# Patient Record
Sex: FEMALE | Race: WHITE | NOT HISPANIC OR LATINO | Employment: OTHER | URBAN - METROPOLITAN AREA
[De-identification: names, ages, dates, MRNs, and addresses within clinical notes are randomized per-mention and may not be internally consistent; named-entity substitution may affect disease eponyms.]

---

## 2022-02-18 ENCOUNTER — APPOINTMENT (OUTPATIENT)
Dept: GENERAL RADIOLOGY | Facility: HOSPITAL | Age: 72
End: 2022-02-18

## 2022-02-18 ENCOUNTER — HOSPITAL ENCOUNTER (INPATIENT)
Facility: HOSPITAL | Age: 72
LOS: 3 days | Discharge: NURSING, REHAB OR LONG-TERM CARE | End: 2022-02-23
Attending: EMERGENCY MEDICINE | Admitting: INTERNAL MEDICINE

## 2022-02-18 ENCOUNTER — APPOINTMENT (OUTPATIENT)
Dept: CT IMAGING | Facility: HOSPITAL | Age: 72
End: 2022-02-18

## 2022-02-18 ENCOUNTER — APPOINTMENT (OUTPATIENT)
Dept: MRI IMAGING | Facility: HOSPITAL | Age: 72
End: 2022-02-18

## 2022-02-18 ENCOUNTER — APPOINTMENT (OUTPATIENT)
Dept: CARDIOLOGY | Facility: HOSPITAL | Age: 72
End: 2022-02-18

## 2022-02-18 DIAGNOSIS — R47.1 DYSARTHRIA: Primary | ICD-10-CM

## 2022-02-18 DIAGNOSIS — I63.9 CEREBROVASCULAR ACCIDENT (CVA), UNSPECIFIED MECHANISM: ICD-10-CM

## 2022-02-18 DIAGNOSIS — G93.40 ENCEPHALOPATHY: ICD-10-CM

## 2022-02-18 DIAGNOSIS — R53.1 WEAKNESS: ICD-10-CM

## 2022-02-18 PROBLEM — E66.9 OBESITY (BMI 30-39.9): Chronic | Status: ACTIVE | Noted: 2022-02-18

## 2022-02-18 PROBLEM — Z72.0 TOBACCO ABUSE: Status: ACTIVE | Noted: 2022-02-18

## 2022-02-18 PROBLEM — B37.0 ORAL THRUSH: Status: ACTIVE | Noted: 2022-02-18

## 2022-02-18 PROBLEM — I16.0 HYPERTENSIVE URGENCY: Status: ACTIVE | Noted: 2022-02-18

## 2022-02-18 PROBLEM — E66.9 OBESITY (BMI 30-39.9): Status: ACTIVE | Noted: 2022-02-18

## 2022-02-18 PROBLEM — R47.01 APHASIA: Status: ACTIVE | Noted: 2022-02-18

## 2022-02-18 PROBLEM — Z72.0 TOBACCO ABUSE: Chronic | Status: ACTIVE | Noted: 2022-02-18

## 2022-02-18 LAB
AMMONIA BLD-SCNC: 12 UMOL/L (ref 11–51)
ANION GAP SERPL CALCULATED.3IONS-SCNC: 12 MMOL/L (ref 5–15)
APTT PPP: 27.2 SECONDS (ref 24–31)
ARTERIAL PATENCY WRIST A: POSITIVE
ATMOSPHERIC PRESS: ABNORMAL MM[HG]
BASE EXCESS BLDA CALC-SCNC: 0.4 MMOL/L (ref 0–3)
BASOPHILS # BLD AUTO: 0.1 10*3/MM3 (ref 0–0.2)
BASOPHILS NFR BLD AUTO: 0.9 % (ref 0–1.5)
BDY SITE: ABNORMAL
BUN SERPL-MCNC: 11 MG/DL (ref 8–23)
BUN/CREAT SERPL: 14.3 (ref 7–25)
CALCIUM SPEC-SCNC: 9.5 MG/DL (ref 8.6–10.5)
CHLORIDE SERPL-SCNC: 104 MMOL/L (ref 98–107)
CHOLEST SERPL-MCNC: 205 MG/DL (ref 0–200)
CO2 BLDA-SCNC: 25.4 MMOL/L (ref 22–29)
CO2 SERPL-SCNC: 22 MMOL/L (ref 22–29)
CREAT SERPL-MCNC: 0.77 MG/DL (ref 0.57–1)
DEPRECATED RDW RBC AUTO: 45.5 FL (ref 37–54)
EOSINOPHIL # BLD AUTO: 0.1 10*3/MM3 (ref 0–0.4)
EOSINOPHIL NFR BLD AUTO: 0.6 % (ref 0.3–6.2)
ERYTHROCYTE [DISTWIDTH] IN BLOOD BY AUTOMATED COUNT: 14.4 % (ref 12.3–15.4)
GFR SERPL CREATININE-BSD FRML MDRD: 74 ML/MIN/1.73
GLUCOSE BLDC GLUCOMTR-MCNC: 108 MG/DL (ref 70–105)
GLUCOSE BLDC GLUCOMTR-MCNC: 121 MG/DL (ref 70–105)
GLUCOSE SERPL-MCNC: 118 MG/DL (ref 65–99)
HBA1C MFR BLD: 5.6 % (ref 3.5–5.6)
HCO3 BLDA-SCNC: 24.3 MMOL/L (ref 21–28)
HCT VFR BLD AUTO: 46.1 % (ref 34–46.6)
HDLC SERPL-MCNC: 65 MG/DL (ref 40–60)
HEMODILUTION: NO
HGB BLD-MCNC: 15.9 G/DL (ref 12–15.9)
HOLD SPECIMEN: NORMAL
HOLD SPECIMEN: NORMAL
INHALED O2 CONCENTRATION: 21 %
INR PPP: 0.97 (ref 0.93–1.1)
LDLC SERPL CALC-MCNC: 124 MG/DL (ref 0–100)
LDLC/HDLC SERPL: 1.87 {RATIO}
LYMPHOCYTES # BLD AUTO: 1.1 10*3/MM3 (ref 0.7–3.1)
LYMPHOCYTES NFR BLD AUTO: 11.7 % (ref 19.6–45.3)
MCH RBC QN AUTO: 31 PG (ref 26.6–33)
MCHC RBC AUTO-ENTMCNC: 34.4 G/DL (ref 31.5–35.7)
MCV RBC AUTO: 90.3 FL (ref 79–97)
MODALITY: ABNORMAL
MONOCYTES # BLD AUTO: 0.4 10*3/MM3 (ref 0.1–0.9)
MONOCYTES NFR BLD AUTO: 4.1 % (ref 5–12)
NEUTROPHILS NFR BLD AUTO: 8 10*3/MM3 (ref 1.7–7)
NEUTROPHILS NFR BLD AUTO: 82.7 % (ref 42.7–76)
NRBC BLD AUTO-RTO: 0.1 /100 WBC (ref 0–0.2)
PCO2 BLDA: 36.5 MM HG (ref 35–48)
PH BLDA: 7.43 PH UNITS (ref 7.35–7.45)
PLATELET # BLD AUTO: 205 10*3/MM3 (ref 140–450)
PMV BLD AUTO: 10.7 FL (ref 6–12)
PO2 BLDA: 54.3 MM HG (ref 83–108)
POTASSIUM SERPL-SCNC: 4.1 MMOL/L (ref 3.5–5.2)
PROTHROMBIN TIME: 10.8 SECONDS (ref 9.6–11.7)
RBC # BLD AUTO: 5.11 10*6/MM3 (ref 3.77–5.28)
SAO2 % BLDCOA: 88.9 % (ref 94–98)
SARS-COV-2 RNA PNL SPEC NAA+PROBE: NOT DETECTED
SODIUM SERPL-SCNC: 138 MMOL/L (ref 136–145)
TRIGL SERPL-MCNC: 92 MG/DL (ref 0–150)
TSH SERPL DL<=0.05 MIU/L-ACNC: 2.5 UIU/ML (ref 0.27–4.2)
VIT B12 BLD-MCNC: 370 PG/ML (ref 211–946)
VLDLC SERPL-MCNC: 16 MG/DL (ref 5–40)
WBC NRBC COR # BLD: 9.7 10*3/MM3 (ref 3.4–10.8)
WHOLE BLOOD HOLD SPECIMEN: NORMAL
WHOLE BLOOD HOLD SPECIMEN: NORMAL

## 2022-02-18 PROCEDURE — 70551 MRI BRAIN STEM W/O DYE: CPT

## 2022-02-18 PROCEDURE — 84443 ASSAY THYROID STIM HORMONE: CPT | Performed by: NURSE PRACTITIONER

## 2022-02-18 PROCEDURE — 85025 COMPLETE CBC W/AUTO DIFF WBC: CPT | Performed by: NURSE PRACTITIONER

## 2022-02-18 PROCEDURE — 85025 COMPLETE CBC W/AUTO DIFF WBC: CPT | Performed by: EMERGENCY MEDICINE

## 2022-02-18 PROCEDURE — 82140 ASSAY OF AMMONIA: CPT | Performed by: NURSE PRACTITIONER

## 2022-02-18 PROCEDURE — 36415 COLL VENOUS BLD VENIPUNCTURE: CPT | Performed by: NURSE PRACTITIONER

## 2022-02-18 PROCEDURE — 71045 X-RAY EXAM CHEST 1 VIEW: CPT

## 2022-02-18 PROCEDURE — 99222 1ST HOSP IP/OBS MODERATE 55: CPT | Performed by: NURSE PRACTITIONER

## 2022-02-18 PROCEDURE — 80048 BASIC METABOLIC PNL TOTAL CA: CPT | Performed by: EMERGENCY MEDICINE

## 2022-02-18 PROCEDURE — 0 IOPAMIDOL PER 1 ML: Performed by: EMERGENCY MEDICINE

## 2022-02-18 PROCEDURE — 0042T HC CT CEREBRAL PERFUSION W/WO CONTRAST: CPT

## 2022-02-18 PROCEDURE — 94799 UNLISTED PULMONARY SVC/PX: CPT

## 2022-02-18 PROCEDURE — 25010000002 ENOXAPARIN PER 10 MG: Performed by: NURSE PRACTITIONER

## 2022-02-18 PROCEDURE — 87635 SARS-COV-2 COVID-19 AMP PRB: CPT | Performed by: HOSPITALIST

## 2022-02-18 PROCEDURE — 93005 ELECTROCARDIOGRAM TRACING: CPT | Performed by: EMERGENCY MEDICINE

## 2022-02-18 PROCEDURE — 70496 CT ANGIOGRAPHY HEAD: CPT

## 2022-02-18 PROCEDURE — 82803 BLOOD GASES ANY COMBINATION: CPT

## 2022-02-18 PROCEDURE — G0378 HOSPITAL OBSERVATION PER HR: HCPCS

## 2022-02-18 PROCEDURE — 99285 EMERGENCY DEPT VISIT HI MDM: CPT

## 2022-02-18 PROCEDURE — 85610 PROTHROMBIN TIME: CPT | Performed by: EMERGENCY MEDICINE

## 2022-02-18 PROCEDURE — 70498 CT ANGIOGRAPHY NECK: CPT

## 2022-02-18 PROCEDURE — 83036 HEMOGLOBIN GLYCOSYLATED A1C: CPT | Performed by: NURSE PRACTITIONER

## 2022-02-18 PROCEDURE — 82607 VITAMIN B-12: CPT | Performed by: NURSE PRACTITIONER

## 2022-02-18 PROCEDURE — 82962 GLUCOSE BLOOD TEST: CPT

## 2022-02-18 PROCEDURE — 4A03X5D MEASUREMENT OF ARTERIAL FLOW, INTRACRANIAL, EXTERNAL APPROACH: ICD-10-PCS | Performed by: RADIOLOGY

## 2022-02-18 PROCEDURE — 80061 LIPID PANEL: CPT | Performed by: NURSE PRACTITIONER

## 2022-02-18 PROCEDURE — 94640 AIRWAY INHALATION TREATMENT: CPT

## 2022-02-18 PROCEDURE — 80048 BASIC METABOLIC PNL TOTAL CA: CPT | Performed by: NURSE PRACTITIONER

## 2022-02-18 PROCEDURE — 85730 THROMBOPLASTIN TIME PARTIAL: CPT | Performed by: EMERGENCY MEDICINE

## 2022-02-18 PROCEDURE — 36600 WITHDRAWAL OF ARTERIAL BLOOD: CPT

## 2022-02-18 PROCEDURE — 70450 CT HEAD/BRAIN W/O DYE: CPT

## 2022-02-18 RX ORDER — SODIUM CHLORIDE 0.9 % (FLUSH) 0.9 %
10 SYRINGE (ML) INJECTION AS NEEDED
Status: DISCONTINUED | OUTPATIENT
Start: 2022-02-18 | End: 2022-02-23 | Stop reason: HOSPADM

## 2022-02-18 RX ORDER — ASPIRIN 325 MG
325 TABLET ORAL DAILY
Status: DISCONTINUED | OUTPATIENT
Start: 2022-02-18 | End: 2022-02-23 | Stop reason: HOSPADM

## 2022-02-18 RX ORDER — ACETAMINOPHEN 325 MG/1
650 TABLET ORAL EVERY 4 HOURS PRN
Status: DISCONTINUED | OUTPATIENT
Start: 2022-02-18 | End: 2022-02-23 | Stop reason: HOSPADM

## 2022-02-18 RX ORDER — ACETAMINOPHEN 650 MG/1
650 SUPPOSITORY RECTAL EVERY 4 HOURS PRN
Status: DISCONTINUED | OUTPATIENT
Start: 2022-02-18 | End: 2022-02-23 | Stop reason: HOSPADM

## 2022-02-18 RX ORDER — IPRATROPIUM BROMIDE AND ALBUTEROL SULFATE 2.5; .5 MG/3ML; MG/3ML
3 SOLUTION RESPIRATORY (INHALATION)
Status: DISCONTINUED | OUTPATIENT
Start: 2022-02-18 | End: 2022-02-23

## 2022-02-18 RX ORDER — ATORVASTATIN CALCIUM 40 MG/1
80 TABLET, FILM COATED ORAL NIGHTLY
Status: DISCONTINUED | OUTPATIENT
Start: 2022-02-18 | End: 2022-02-23 | Stop reason: HOSPADM

## 2022-02-18 RX ORDER — ONDANSETRON 4 MG/1
4 TABLET, FILM COATED ORAL EVERY 6 HOURS PRN
Status: DISCONTINUED | OUTPATIENT
Start: 2022-02-18 | End: 2022-02-23 | Stop reason: HOSPADM

## 2022-02-18 RX ORDER — ACETAMINOPHEN 325 MG/1
650 TABLET ORAL EVERY 4 HOURS PRN
Status: DISCONTINUED | OUTPATIENT
Start: 2022-02-18 | End: 2022-02-18

## 2022-02-18 RX ORDER — ONDANSETRON 2 MG/ML
4 INJECTION INTRAMUSCULAR; INTRAVENOUS EVERY 6 HOURS PRN
Status: DISCONTINUED | OUTPATIENT
Start: 2022-02-18 | End: 2022-02-23 | Stop reason: HOSPADM

## 2022-02-18 RX ORDER — NICOTINE 21 MG/24HR
1 PATCH, TRANSDERMAL 24 HOURS TRANSDERMAL EVERY 24 HOURS
Status: DISCONTINUED | OUTPATIENT
Start: 2022-02-18 | End: 2022-02-23 | Stop reason: HOSPADM

## 2022-02-18 RX ORDER — ONDANSETRON 2 MG/ML
4 INJECTION INTRAMUSCULAR; INTRAVENOUS EVERY 6 HOURS PRN
Status: DISCONTINUED | OUTPATIENT
Start: 2022-02-18 | End: 2022-02-18

## 2022-02-18 RX ORDER — ACETAMINOPHEN 160 MG/5ML
650 SOLUTION ORAL EVERY 4 HOURS PRN
Status: DISCONTINUED | OUTPATIENT
Start: 2022-02-18 | End: 2022-02-23 | Stop reason: HOSPADM

## 2022-02-18 RX ORDER — ALUMINA, MAGNESIA, AND SIMETHICONE 2400; 2400; 240 MG/30ML; MG/30ML; MG/30ML
15 SUSPENSION ORAL EVERY 6 HOURS PRN
Status: DISCONTINUED | OUTPATIENT
Start: 2022-02-18 | End: 2022-02-23 | Stop reason: HOSPADM

## 2022-02-18 RX ORDER — POTASSIUM CHLORIDE 1.5 G/1.77G
40 POWDER, FOR SOLUTION ORAL AS NEEDED
Status: DISCONTINUED | OUTPATIENT
Start: 2022-02-18 | End: 2022-02-23 | Stop reason: HOSPADM

## 2022-02-18 RX ORDER — BISACODYL 10 MG
10 SUPPOSITORY, RECTAL RECTAL DAILY PRN
Status: DISCONTINUED | OUTPATIENT
Start: 2022-02-18 | End: 2022-02-23 | Stop reason: HOSPADM

## 2022-02-18 RX ORDER — ASPIRIN 300 MG/1
300 SUPPOSITORY RECTAL DAILY
Status: DISCONTINUED | OUTPATIENT
Start: 2022-02-18 | End: 2022-02-23 | Stop reason: HOSPADM

## 2022-02-18 RX ORDER — ACETAMINOPHEN 650 MG/1
650 SUPPOSITORY RECTAL EVERY 4 HOURS PRN
Status: DISCONTINUED | OUTPATIENT
Start: 2022-02-18 | End: 2022-02-18

## 2022-02-18 RX ORDER — CHOLECALCIFEROL (VITAMIN D3) 125 MCG
5 CAPSULE ORAL NIGHTLY PRN
Status: DISCONTINUED | OUTPATIENT
Start: 2022-02-18 | End: 2022-02-23 | Stop reason: HOSPADM

## 2022-02-18 RX ORDER — POTASSIUM CHLORIDE 20 MEQ/1
40 TABLET, EXTENDED RELEASE ORAL AS NEEDED
Status: DISCONTINUED | OUTPATIENT
Start: 2022-02-18 | End: 2022-02-23 | Stop reason: HOSPADM

## 2022-02-18 RX ADMIN — NICARDIPINE HYDROCHLORIDE 5 MG/HR: 25 INJECTION, SOLUTION INTRAVENOUS at 20:13

## 2022-02-18 RX ADMIN — NICARDIPINE HYDROCHLORIDE 5 MG/HR: 25 INJECTION, SOLUTION INTRAVENOUS at 14:02

## 2022-02-18 RX ADMIN — IPRATROPIUM BROMIDE AND ALBUTEROL SULFATE 3 ML: 2.5; .5 SOLUTION RESPIRATORY (INHALATION) at 18:57

## 2022-02-18 RX ADMIN — ASPIRIN 300 MG: 300 SUPPOSITORY RECTAL at 16:13

## 2022-02-18 RX ADMIN — IOPAMIDOL 100 ML: 755 INJECTION, SOLUTION INTRAVENOUS at 13:17

## 2022-02-18 RX ADMIN — IOPAMIDOL 50 ML: 755 INJECTION, SOLUTION INTRAVENOUS at 13:17

## 2022-02-18 RX ADMIN — NICARDIPINE HYDROCHLORIDE 5 MG/HR: 25 INJECTION, SOLUTION INTRAVENOUS at 23:03

## 2022-02-18 RX ADMIN — ENOXAPARIN SODIUM 40 MG: 40 INJECTION SUBCUTANEOUS at 16:13

## 2022-02-19 ENCOUNTER — APPOINTMENT (OUTPATIENT)
Dept: CARDIOLOGY | Facility: HOSPITAL | Age: 72
End: 2022-02-19

## 2022-02-19 LAB
ANION GAP SERPL CALCULATED.3IONS-SCNC: 13 MMOL/L (ref 5–15)
BASOPHILS # BLD AUTO: 0.1 10*3/MM3 (ref 0–0.2)
BASOPHILS NFR BLD AUTO: 0.5 % (ref 0–1.5)
BUN SERPL-MCNC: 11 MG/DL (ref 8–23)
BUN/CREAT SERPL: 16.4 (ref 7–25)
CALCIUM SPEC-SCNC: 9.1 MG/DL (ref 8.6–10.5)
CHLORIDE SERPL-SCNC: 103 MMOL/L (ref 98–107)
CO2 SERPL-SCNC: 24 MMOL/L (ref 22–29)
CREAT SERPL-MCNC: 0.67 MG/DL (ref 0.57–1)
DEPRECATED RDW RBC AUTO: 45.9 FL (ref 37–54)
EOSINOPHIL # BLD AUTO: 0.1 10*3/MM3 (ref 0–0.4)
EOSINOPHIL NFR BLD AUTO: 0.9 % (ref 0.3–6.2)
ERYTHROCYTE [DISTWIDTH] IN BLOOD BY AUTOMATED COUNT: 14.5 % (ref 12.3–15.4)
GFR SERPL CREATININE-BSD FRML MDRD: 87 ML/MIN/1.73
GLUCOSE BLDC GLUCOMTR-MCNC: 114 MG/DL (ref 70–105)
GLUCOSE SERPL-MCNC: 103 MG/DL (ref 65–99)
HCT VFR BLD AUTO: 45 % (ref 34–46.6)
HGB BLD-MCNC: 15.1 G/DL (ref 12–15.9)
LYMPHOCYTES # BLD AUTO: 1.8 10*3/MM3 (ref 0.7–3.1)
LYMPHOCYTES NFR BLD AUTO: 15.9 % (ref 19.6–45.3)
MAXIMAL PREDICTED HEART RATE: 148 BPM
MCH RBC QN AUTO: 30.3 PG (ref 26.6–33)
MCHC RBC AUTO-ENTMCNC: 33.5 G/DL (ref 31.5–35.7)
MCV RBC AUTO: 90.6 FL (ref 79–97)
MONOCYTES # BLD AUTO: 0.6 10*3/MM3 (ref 0.1–0.9)
MONOCYTES NFR BLD AUTO: 5.6 % (ref 5–12)
NEUTROPHILS NFR BLD AUTO: 77.1 % (ref 42.7–76)
NEUTROPHILS NFR BLD AUTO: 8.7 10*3/MM3 (ref 1.7–7)
NRBC BLD AUTO-RTO: 0 /100 WBC (ref 0–0.2)
PLATELET # BLD AUTO: 197 10*3/MM3 (ref 140–450)
PMV BLD AUTO: 10.6 FL (ref 6–12)
POTASSIUM SERPL-SCNC: 3.7 MMOL/L (ref 3.5–5.2)
RBC # BLD AUTO: 4.97 10*6/MM3 (ref 3.77–5.28)
SODIUM SERPL-SCNC: 140 MMOL/L (ref 136–145)
STRESS TARGET HR: 126 BPM
WBC NRBC COR # BLD: 11.2 10*3/MM3 (ref 3.4–10.8)

## 2022-02-19 PROCEDURE — 99232 SBSQ HOSP IP/OBS MODERATE 35: CPT | Performed by: HOSPITALIST

## 2022-02-19 PROCEDURE — 94799 UNLISTED PULMONARY SVC/PX: CPT

## 2022-02-19 PROCEDURE — G0378 HOSPITAL OBSERVATION PER HR: HCPCS

## 2022-02-19 PROCEDURE — 93306 TTE W/DOPPLER COMPLETE: CPT

## 2022-02-19 PROCEDURE — 25010000002 ENOXAPARIN PER 10 MG: Performed by: NURSE PRACTITIONER

## 2022-02-19 PROCEDURE — 93306 TTE W/DOPPLER COMPLETE: CPT | Performed by: INTERNAL MEDICINE

## 2022-02-19 PROCEDURE — 97162 PT EVAL MOD COMPLEX 30 MIN: CPT

## 2022-02-19 PROCEDURE — 92523 SPEECH SOUND LANG COMPREHEN: CPT

## 2022-02-19 PROCEDURE — 92610 EVALUATE SWALLOWING FUNCTION: CPT

## 2022-02-19 PROCEDURE — 82962 GLUCOSE BLOOD TEST: CPT

## 2022-02-19 RX ORDER — LABETALOL HYDROCHLORIDE 5 MG/ML
10 INJECTION, SOLUTION INTRAVENOUS
Status: DISCONTINUED | OUTPATIENT
Start: 2022-02-19 | End: 2022-02-23 | Stop reason: HOSPADM

## 2022-02-19 RX ADMIN — ATORVASTATIN CALCIUM 80 MG: 40 TABLET, FILM COATED ORAL at 20:18

## 2022-02-19 RX ADMIN — NYSTATIN 500000 UNITS: 100000 SUSPENSION ORAL at 04:13

## 2022-02-19 RX ADMIN — LABETALOL 20 MG/4 ML (5 MG/ML) INTRAVENOUS SYRINGE 10 MG: at 22:52

## 2022-02-19 RX ADMIN — NYSTATIN 500000 UNITS: 100000 SUSPENSION ORAL at 12:48

## 2022-02-19 RX ADMIN — ENOXAPARIN SODIUM 40 MG: 40 INJECTION SUBCUTANEOUS at 17:31

## 2022-02-19 RX ADMIN — IPRATROPIUM BROMIDE AND ALBUTEROL SULFATE 3 ML: 2.5; .5 SOLUTION RESPIRATORY (INHALATION) at 11:30

## 2022-02-19 RX ADMIN — IPRATROPIUM BROMIDE AND ALBUTEROL SULFATE 3 ML: 2.5; .5 SOLUTION RESPIRATORY (INHALATION) at 15:39

## 2022-02-19 RX ADMIN — NYSTATIN 500000 UNITS: 100000 SUSPENSION ORAL at 17:32

## 2022-02-19 RX ADMIN — IPRATROPIUM BROMIDE AND ALBUTEROL SULFATE 3 ML: 2.5; .5 SOLUTION RESPIRATORY (INHALATION) at 07:35

## 2022-02-19 RX ADMIN — ASPIRIN 325 MG ORAL TABLET 325 MG: 325 PILL ORAL at 12:47

## 2022-02-19 RX ADMIN — NYSTATIN 500000 UNITS: 100000 SUSPENSION ORAL at 20:18

## 2022-02-20 PROBLEM — I63.9 CVA (CEREBRAL VASCULAR ACCIDENT): Status: ACTIVE | Noted: 2022-02-20

## 2022-02-20 LAB
AMPHET+METHAMPHET UR QL: NEGATIVE
BACTERIA UR QL AUTO: ABNORMAL /HPF
BARBITURATES UR QL SCN: NEGATIVE
BASOPHILS # BLD AUTO: 0.1 10*3/MM3 (ref 0–0.2)
BASOPHILS NFR BLD AUTO: 1.1 % (ref 0–1.5)
BENZODIAZ UR QL SCN: NEGATIVE
BILIRUB UR QL STRIP: ABNORMAL
CANNABINOIDS SERPL QL: NEGATIVE
CLARITY UR: CLEAR
COCAINE UR QL: NEGATIVE
COLOR UR: ABNORMAL
DEPRECATED RDW RBC AUTO: 46.8 FL (ref 37–54)
EOSINOPHIL # BLD AUTO: 0.3 10*3/MM3 (ref 0–0.4)
EOSINOPHIL NFR BLD AUTO: 2.9 % (ref 0.3–6.2)
ERYTHROCYTE [DISTWIDTH] IN BLOOD BY AUTOMATED COUNT: 14.5 % (ref 12.3–15.4)
GLUCOSE BLDC GLUCOMTR-MCNC: 105 MG/DL (ref 70–105)
GLUCOSE BLDC GLUCOMTR-MCNC: 106 MG/DL (ref 70–105)
GLUCOSE UR STRIP-MCNC: NEGATIVE MG/DL
HCT VFR BLD AUTO: 44 % (ref 34–46.6)
HGB BLD-MCNC: 14.7 G/DL (ref 12–15.9)
HGB UR QL STRIP.AUTO: NEGATIVE
HYALINE CASTS UR QL AUTO: ABNORMAL /LPF
KETONES UR QL STRIP: ABNORMAL
LEUKOCYTE ESTERASE UR QL STRIP.AUTO: ABNORMAL
LYMPHOCYTES # BLD AUTO: 2 10*3/MM3 (ref 0.7–3.1)
LYMPHOCYTES NFR BLD AUTO: 20.7 % (ref 19.6–45.3)
MCH RBC QN AUTO: 30.2 PG (ref 26.6–33)
MCHC RBC AUTO-ENTMCNC: 33.3 G/DL (ref 31.5–35.7)
MCV RBC AUTO: 90.7 FL (ref 79–97)
METHADONE UR QL SCN: NEGATIVE
MONOCYTES # BLD AUTO: 0.8 10*3/MM3 (ref 0.1–0.9)
MONOCYTES NFR BLD AUTO: 8 % (ref 5–12)
MUCOUS THREADS URNS QL MICRO: ABNORMAL /HPF
NEUTROPHILS NFR BLD AUTO: 6.4 10*3/MM3 (ref 1.7–7)
NEUTROPHILS NFR BLD AUTO: 67.3 % (ref 42.7–76)
NITRITE UR QL STRIP: POSITIVE
NRBC BLD AUTO-RTO: 0.1 /100 WBC (ref 0–0.2)
OPIATES UR QL: NEGATIVE
OXYCODONE UR QL SCN: NEGATIVE
PH UR STRIP.AUTO: <=5 [PH] (ref 5–8)
PLATELET # BLD AUTO: 188 10*3/MM3 (ref 140–450)
PMV BLD AUTO: 10.7 FL (ref 6–12)
PROT UR QL STRIP: ABNORMAL
RBC # BLD AUTO: 4.85 10*6/MM3 (ref 3.77–5.28)
RBC # UR STRIP: ABNORMAL /HPF
REF LAB TEST METHOD: ABNORMAL
SP GR UR STRIP: 1.03 (ref 1–1.03)
SQUAMOUS #/AREA URNS HPF: ABNORMAL /HPF
UROBILINOGEN UR QL STRIP: ABNORMAL
WBC # UR STRIP: ABNORMAL /HPF
WBC NRBC COR # BLD: 9.6 10*3/MM3 (ref 3.4–10.8)

## 2022-02-20 PROCEDURE — 82962 GLUCOSE BLOOD TEST: CPT

## 2022-02-20 PROCEDURE — 80307 DRUG TEST PRSMV CHEM ANLYZR: CPT | Performed by: NURSE PRACTITIONER

## 2022-02-20 PROCEDURE — 25010000002 ENOXAPARIN PER 10 MG: Performed by: NURSE PRACTITIONER

## 2022-02-20 PROCEDURE — 99222 1ST HOSP IP/OBS MODERATE 55: CPT | Performed by: PSYCHIATRY & NEUROLOGY

## 2022-02-20 PROCEDURE — 85025 COMPLETE CBC W/AUTO DIFF WBC: CPT | Performed by: HOSPITALIST

## 2022-02-20 PROCEDURE — 94799 UNLISTED PULMONARY SVC/PX: CPT

## 2022-02-20 PROCEDURE — 81001 URINALYSIS AUTO W/SCOPE: CPT | Performed by: NURSE PRACTITIONER

## 2022-02-20 PROCEDURE — 80048 BASIC METABOLIC PNL TOTAL CA: CPT | Performed by: HOSPITALIST

## 2022-02-20 PROCEDURE — 99232 SBSQ HOSP IP/OBS MODERATE 35: CPT | Performed by: HOSPITALIST

## 2022-02-20 RX ORDER — AMLODIPINE BESYLATE 5 MG/1
5 TABLET ORAL
Status: DISCONTINUED | OUTPATIENT
Start: 2022-02-20 | End: 2022-02-23 | Stop reason: HOSPADM

## 2022-02-20 RX ORDER — LISINOPRIL 5 MG/1
5 TABLET ORAL
Status: DISCONTINUED | OUTPATIENT
Start: 2022-02-20 | End: 2022-02-21

## 2022-02-20 RX ORDER — DOCUSATE SODIUM 100 MG/1
100 CAPSULE, LIQUID FILLED ORAL 2 TIMES DAILY
Status: DISCONTINUED | OUTPATIENT
Start: 2022-02-20 | End: 2022-02-23 | Stop reason: HOSPADM

## 2022-02-20 RX ADMIN — AMLODIPINE BESYLATE 5 MG: 5 TABLET ORAL at 15:17

## 2022-02-20 RX ADMIN — ENOXAPARIN SODIUM 40 MG: 40 INJECTION SUBCUTANEOUS at 15:18

## 2022-02-20 RX ADMIN — LISINOPRIL 5 MG: 5 TABLET ORAL at 15:17

## 2022-02-20 RX ADMIN — IPRATROPIUM BROMIDE AND ALBUTEROL SULFATE 3 ML: 2.5; .5 SOLUTION RESPIRATORY (INHALATION) at 11:56

## 2022-02-20 RX ADMIN — IPRATROPIUM BROMIDE AND ALBUTEROL SULFATE 3 ML: 2.5; .5 SOLUTION RESPIRATORY (INHALATION) at 08:07

## 2022-02-20 RX ADMIN — ATORVASTATIN CALCIUM 80 MG: 40 TABLET, FILM COATED ORAL at 21:02

## 2022-02-20 RX ADMIN — NYSTATIN 500000 UNITS: 100000 SUSPENSION ORAL at 08:27

## 2022-02-20 RX ADMIN — NYSTATIN 500000 UNITS: 100000 SUSPENSION ORAL at 21:02

## 2022-02-20 RX ADMIN — LABETALOL 20 MG/4 ML (5 MG/ML) INTRAVENOUS SYRINGE 10 MG: at 14:09

## 2022-02-20 RX ADMIN — IPRATROPIUM BROMIDE AND ALBUTEROL SULFATE 3 ML: 2.5; .5 SOLUTION RESPIRATORY (INHALATION) at 16:21

## 2022-02-20 RX ADMIN — DOCUSATE SODIUM 100 MG: 100 CAPSULE, LIQUID FILLED ORAL at 21:02

## 2022-02-20 RX ADMIN — ASPIRIN 325 MG ORAL TABLET 325 MG: 325 PILL ORAL at 08:27

## 2022-02-21 LAB
ANION GAP SERPL CALCULATED.3IONS-SCNC: 10 MMOL/L (ref 5–15)
BUN SERPL-MCNC: 20 MG/DL (ref 8–23)
BUN/CREAT SERPL: 27.8 (ref 7–25)
CALCIUM SPEC-SCNC: 9.1 MG/DL (ref 8.6–10.5)
CHLORIDE SERPL-SCNC: 107 MMOL/L (ref 98–107)
CO2 SERPL-SCNC: 24 MMOL/L (ref 22–29)
CREAT SERPL-MCNC: 0.72 MG/DL (ref 0.57–1)
GFR SERPL CREATININE-BSD FRML MDRD: 80 ML/MIN/1.73
GLUCOSE SERPL-MCNC: 97 MG/DL (ref 65–99)
POTASSIUM SERPL-SCNC: 4.3 MMOL/L (ref 3.5–5.2)
SODIUM SERPL-SCNC: 141 MMOL/L (ref 136–145)

## 2022-02-21 PROCEDURE — 25010000002 ENOXAPARIN PER 10 MG: Performed by: NURSE PRACTITIONER

## 2022-02-21 PROCEDURE — 97535 SELF CARE MNGMENT TRAINING: CPT

## 2022-02-21 PROCEDURE — 99233 SBSQ HOSP IP/OBS HIGH 50: CPT | Performed by: INTERNAL MEDICINE

## 2022-02-21 PROCEDURE — 25010000002 CYANOCOBALAMIN PER 1000 MCG: Performed by: PSYCHIATRY & NEUROLOGY

## 2022-02-21 PROCEDURE — 97530 THERAPEUTIC ACTIVITIES: CPT

## 2022-02-21 PROCEDURE — 99233 SBSQ HOSP IP/OBS HIGH 50: CPT | Performed by: PSYCHIATRY & NEUROLOGY

## 2022-02-21 PROCEDURE — 97167 OT EVAL HIGH COMPLEX 60 MIN: CPT

## 2022-02-21 PROCEDURE — 92526 ORAL FUNCTION THERAPY: CPT

## 2022-02-21 RX ORDER — CYANOCOBALAMIN 1000 UG/ML
1000 INJECTION, SOLUTION INTRAMUSCULAR; SUBCUTANEOUS
Status: DISCONTINUED | OUTPATIENT
Start: 2022-02-21 | End: 2022-02-23 | Stop reason: HOSPADM

## 2022-02-21 RX ORDER — LISINOPRIL 5 MG/1
10 TABLET ORAL
Status: DISCONTINUED | OUTPATIENT
Start: 2022-02-22 | End: 2022-02-23 | Stop reason: HOSPADM

## 2022-02-21 RX ADMIN — AMLODIPINE BESYLATE 5 MG: 5 TABLET ORAL at 08:40

## 2022-02-21 RX ADMIN — NYSTATIN 500000 UNITS: 100000 SUSPENSION ORAL at 08:40

## 2022-02-21 RX ADMIN — CYANOCOBALAMIN 1000 MCG: 1000 INJECTION, SOLUTION INTRAMUSCULAR at 14:26

## 2022-02-21 RX ADMIN — DOCUSATE SODIUM 100 MG: 100 CAPSULE, LIQUID FILLED ORAL at 20:10

## 2022-02-21 RX ADMIN — NYSTATIN 500000 UNITS: 100000 SUSPENSION ORAL at 12:48

## 2022-02-21 RX ADMIN — ASPIRIN 325 MG ORAL TABLET 325 MG: 325 PILL ORAL at 08:40

## 2022-02-21 RX ADMIN — ENOXAPARIN SODIUM 40 MG: 40 INJECTION SUBCUTANEOUS at 16:24

## 2022-02-21 RX ADMIN — LISINOPRIL 5 MG: 5 TABLET ORAL at 08:40

## 2022-02-21 RX ADMIN — ATORVASTATIN CALCIUM 80 MG: 40 TABLET, FILM COATED ORAL at 20:10

## 2022-02-21 RX ADMIN — DOCUSATE SODIUM 100 MG: 100 CAPSULE, LIQUID FILLED ORAL at 08:40

## 2022-02-22 LAB
ANION GAP SERPL CALCULATED.3IONS-SCNC: 11 MMOL/L (ref 5–15)
BASOPHILS # BLD AUTO: 0.1 10*3/MM3 (ref 0–0.2)
BASOPHILS NFR BLD AUTO: 0.6 % (ref 0–1.5)
BUN SERPL-MCNC: 20 MG/DL (ref 8–23)
BUN/CREAT SERPL: 28.6 (ref 7–25)
CALCIUM SPEC-SCNC: 9.1 MG/DL (ref 8.6–10.5)
CHLORIDE SERPL-SCNC: 106 MMOL/L (ref 98–107)
CO2 SERPL-SCNC: 24 MMOL/L (ref 22–29)
CREAT SERPL-MCNC: 0.7 MG/DL (ref 0.57–1)
DEPRECATED RDW RBC AUTO: 45.1 FL (ref 37–54)
EOSINOPHIL # BLD AUTO: 0.3 10*3/MM3 (ref 0–0.4)
EOSINOPHIL NFR BLD AUTO: 3.3 % (ref 0.3–6.2)
ERYTHROCYTE [DISTWIDTH] IN BLOOD BY AUTOMATED COUNT: 14.2 % (ref 12.3–15.4)
GFR SERPL CREATININE-BSD FRML MDRD: 82 ML/MIN/1.73
GLUCOSE SERPL-MCNC: 91 MG/DL (ref 65–99)
HCT VFR BLD AUTO: 43.9 % (ref 34–46.6)
HGB BLD-MCNC: 14.8 G/DL (ref 12–15.9)
LYMPHOCYTES # BLD AUTO: 1.6 10*3/MM3 (ref 0.7–3.1)
LYMPHOCYTES NFR BLD AUTO: 16.2 % (ref 19.6–45.3)
MCH RBC QN AUTO: 30.4 PG (ref 26.6–33)
MCHC RBC AUTO-ENTMCNC: 33.8 G/DL (ref 31.5–35.7)
MCV RBC AUTO: 89.8 FL (ref 79–97)
MONOCYTES # BLD AUTO: 0.6 10*3/MM3 (ref 0.1–0.9)
MONOCYTES NFR BLD AUTO: 6.2 % (ref 5–12)
NEUTROPHILS NFR BLD AUTO: 7.1 10*3/MM3 (ref 1.7–7)
NEUTROPHILS NFR BLD AUTO: 73.7 % (ref 42.7–76)
NRBC BLD AUTO-RTO: 0.1 /100 WBC (ref 0–0.2)
PLATELET # BLD AUTO: 183 10*3/MM3 (ref 140–450)
PMV BLD AUTO: 10.8 FL (ref 6–12)
POTASSIUM SERPL-SCNC: 3.7 MMOL/L (ref 3.5–5.2)
QT INTERVAL: 405 MS
RBC # BLD AUTO: 4.89 10*6/MM3 (ref 3.77–5.28)
SODIUM SERPL-SCNC: 141 MMOL/L (ref 136–145)
WBC NRBC COR # BLD: 9.7 10*3/MM3 (ref 3.4–10.8)

## 2022-02-22 PROCEDURE — 97112 NEUROMUSCULAR REEDUCATION: CPT

## 2022-02-22 PROCEDURE — 92526 ORAL FUNCTION THERAPY: CPT

## 2022-02-22 PROCEDURE — 97116 GAIT TRAINING THERAPY: CPT

## 2022-02-22 PROCEDURE — 92507 TX SP LANG VOICE COMM INDIV: CPT

## 2022-02-22 PROCEDURE — 99233 SBSQ HOSP IP/OBS HIGH 50: CPT | Performed by: INTERNAL MEDICINE

## 2022-02-22 PROCEDURE — 97535 SELF CARE MNGMENT TRAINING: CPT

## 2022-02-22 PROCEDURE — 85025 COMPLETE CBC W/AUTO DIFF WBC: CPT | Performed by: HOSPITALIST

## 2022-02-22 PROCEDURE — 80048 BASIC METABOLIC PNL TOTAL CA: CPT | Performed by: HOSPITALIST

## 2022-02-22 PROCEDURE — 97530 THERAPEUTIC ACTIVITIES: CPT

## 2022-02-22 PROCEDURE — 25010000002 ENOXAPARIN PER 10 MG: Performed by: NURSE PRACTITIONER

## 2022-02-22 RX ADMIN — ASPIRIN 325 MG ORAL TABLET 325 MG: 325 PILL ORAL at 09:44

## 2022-02-22 RX ADMIN — Medication 10 ML: at 09:45

## 2022-02-22 RX ADMIN — DOCUSATE SODIUM 100 MG: 100 CAPSULE, LIQUID FILLED ORAL at 09:44

## 2022-02-22 RX ADMIN — AMLODIPINE BESYLATE 5 MG: 5 TABLET ORAL at 09:44

## 2022-02-22 RX ADMIN — LISINOPRIL 10 MG: 5 TABLET ORAL at 09:44

## 2022-02-22 RX ADMIN — ENOXAPARIN SODIUM 40 MG: 40 INJECTION SUBCUTANEOUS at 16:58

## 2022-02-22 RX ADMIN — ATORVASTATIN CALCIUM 80 MG: 40 TABLET, FILM COATED ORAL at 21:19

## 2022-02-23 VITALS
HEART RATE: 76 BPM | BODY MASS INDEX: 31.84 KG/M2 | RESPIRATION RATE: 20 BRPM | TEMPERATURE: 97.7 F | WEIGHT: 210.1 LBS | SYSTOLIC BLOOD PRESSURE: 143 MMHG | OXYGEN SATURATION: 94 % | DIASTOLIC BLOOD PRESSURE: 57 MMHG | HEIGHT: 68 IN

## 2022-02-23 LAB
ANION GAP SERPL CALCULATED.3IONS-SCNC: 12 MMOL/L (ref 5–15)
BASOPHILS # BLD AUTO: 0.1 10*3/MM3 (ref 0–0.2)
BASOPHILS NFR BLD AUTO: 0.9 % (ref 0–1.5)
BUN SERPL-MCNC: 19 MG/DL (ref 8–23)
BUN/CREAT SERPL: 28.8 (ref 7–25)
CALCIUM SPEC-SCNC: 9 MG/DL (ref 8.6–10.5)
CHLORIDE SERPL-SCNC: 105 MMOL/L (ref 98–107)
CO2 SERPL-SCNC: 22 MMOL/L (ref 22–29)
CREAT SERPL-MCNC: 0.66 MG/DL (ref 0.57–1)
DEPRECATED RDW RBC AUTO: 45.9 FL (ref 37–54)
EOSINOPHIL # BLD AUTO: 0.2 10*3/MM3 (ref 0–0.4)
EOSINOPHIL NFR BLD AUTO: 1.9 % (ref 0.3–6.2)
ERYTHROCYTE [DISTWIDTH] IN BLOOD BY AUTOMATED COUNT: 14.3 % (ref 12.3–15.4)
GFR SERPL CREATININE-BSD FRML MDRD: 88 ML/MIN/1.73
GLUCOSE SERPL-MCNC: 93 MG/DL (ref 65–99)
HCT VFR BLD AUTO: 44.1 % (ref 34–46.6)
HGB BLD-MCNC: 15 G/DL (ref 12–15.9)
LYMPHOCYTES # BLD AUTO: 1.8 10*3/MM3 (ref 0.7–3.1)
LYMPHOCYTES NFR BLD AUTO: 16.9 % (ref 19.6–45.3)
MCH RBC QN AUTO: 30.9 PG (ref 26.6–33)
MCHC RBC AUTO-ENTMCNC: 34.1 G/DL (ref 31.5–35.7)
MCV RBC AUTO: 90.6 FL (ref 79–97)
MONOCYTES # BLD AUTO: 0.7 10*3/MM3 (ref 0.1–0.9)
MONOCYTES NFR BLD AUTO: 6.6 % (ref 5–12)
NEUTROPHILS NFR BLD AUTO: 7.9 10*3/MM3 (ref 1.7–7)
NEUTROPHILS NFR BLD AUTO: 73.7 % (ref 42.7–76)
NRBC BLD AUTO-RTO: 0 /100 WBC (ref 0–0.2)
PLATELET # BLD AUTO: 188 10*3/MM3 (ref 140–450)
PMV BLD AUTO: 11 FL (ref 6–12)
POTASSIUM SERPL-SCNC: 3.3 MMOL/L (ref 3.5–5.2)
RBC # BLD AUTO: 4.87 10*6/MM3 (ref 3.77–5.28)
SODIUM SERPL-SCNC: 139 MMOL/L (ref 136–145)
WBC NRBC COR # BLD: 10.7 10*3/MM3 (ref 3.4–10.8)

## 2022-02-23 PROCEDURE — 80048 BASIC METABOLIC PNL TOTAL CA: CPT | Performed by: HOSPITALIST

## 2022-02-23 PROCEDURE — 85025 COMPLETE CBC W/AUTO DIFF WBC: CPT | Performed by: HOSPITALIST

## 2022-02-23 PROCEDURE — 99239 HOSP IP/OBS DSCHRG MGMT >30: CPT | Performed by: INTERNAL MEDICINE

## 2022-02-23 RX ORDER — IPRATROPIUM BROMIDE AND ALBUTEROL SULFATE 2.5; .5 MG/3ML; MG/3ML
3 SOLUTION RESPIRATORY (INHALATION) EVERY 4 HOURS PRN
Status: DISCONTINUED | OUTPATIENT
Start: 2022-02-23 | End: 2022-02-23 | Stop reason: HOSPADM

## 2022-02-23 RX ORDER — PSEUDOEPHEDRINE HCL 30 MG
100 TABLET ORAL 2 TIMES DAILY
Start: 2022-02-23 | End: 2022-03-16

## 2022-02-23 RX ORDER — ASPIRIN 325 MG
325 TABLET ORAL DAILY
Start: 2022-02-24 | End: 2022-12-09 | Stop reason: HOSPADM

## 2022-02-23 RX ORDER — CYANOCOBALAMIN 1000 UG/ML
1000 INJECTION, SOLUTION INTRAMUSCULAR; SUBCUTANEOUS
Start: 2022-03-21 | End: 2022-03-16

## 2022-02-23 RX ORDER — CHOLECALCIFEROL (VITAMIN D3) 125 MCG
5 CAPSULE ORAL NIGHTLY PRN
Start: 2022-02-23 | End: 2022-03-16

## 2022-02-23 RX ORDER — LISINOPRIL 10 MG/1
10 TABLET ORAL
Start: 2022-02-24 | End: 2022-03-16 | Stop reason: SDUPTHER

## 2022-02-23 RX ORDER — ATORVASTATIN CALCIUM 80 MG/1
80 TABLET, FILM COATED ORAL NIGHTLY
Start: 2022-02-23 | End: 2022-03-16 | Stop reason: SDUPTHER

## 2022-02-23 RX ORDER — AMLODIPINE BESYLATE 5 MG/1
5 TABLET ORAL
Start: 2022-02-24 | End: 2022-03-16 | Stop reason: SDUPTHER

## 2022-02-23 RX ORDER — IPRATROPIUM BROMIDE AND ALBUTEROL SULFATE 2.5; .5 MG/3ML; MG/3ML
3 SOLUTION RESPIRATORY (INHALATION) EVERY 4 HOURS PRN
Qty: 360 ML
Start: 2022-02-23 | End: 2022-03-16

## 2022-02-23 RX ORDER — NICOTINE 21 MG/24HR
1 PATCH, TRANSDERMAL 24 HOURS TRANSDERMAL EVERY 24 HOURS
Start: 2022-02-24 | End: 2022-03-16

## 2022-02-23 RX ADMIN — LISINOPRIL 10 MG: 5 TABLET ORAL at 08:37

## 2022-02-23 RX ADMIN — POTASSIUM CHLORIDE 40 MEQ: 1500 TABLET, EXTENDED RELEASE ORAL at 14:30

## 2022-02-23 RX ADMIN — ASPIRIN 325 MG ORAL TABLET 325 MG: 325 PILL ORAL at 08:37

## 2022-02-23 RX ADMIN — POTASSIUM CHLORIDE 40 MEQ: 1500 TABLET, EXTENDED RELEASE ORAL at 08:37

## 2022-02-23 RX ADMIN — Medication 10 ML: at 08:37

## 2022-02-23 RX ADMIN — AMLODIPINE BESYLATE 5 MG: 5 TABLET ORAL at 08:37

## 2022-03-16 ENCOUNTER — OFFICE VISIT (OUTPATIENT)
Dept: FAMILY MEDICINE CLINIC | Facility: CLINIC | Age: 72
End: 2022-03-16

## 2022-03-16 VITALS
HEIGHT: 68 IN | OXYGEN SATURATION: 96 % | SYSTOLIC BLOOD PRESSURE: 151 MMHG | BODY MASS INDEX: 31.37 KG/M2 | HEART RATE: 67 BPM | DIASTOLIC BLOOD PRESSURE: 75 MMHG | WEIGHT: 207 LBS

## 2022-03-16 DIAGNOSIS — R53.1 RIGHT SIDED WEAKNESS: ICD-10-CM

## 2022-03-16 DIAGNOSIS — E53.8 B12 DEFICIENCY: ICD-10-CM

## 2022-03-16 DIAGNOSIS — I63.81 CEREBROVASCULAR ACCIDENT (CVA) OF LEFT BASAL GANGLIA: Primary | ICD-10-CM

## 2022-03-16 DIAGNOSIS — I10 PRIMARY HYPERTENSION: ICD-10-CM

## 2022-03-16 DIAGNOSIS — E78.2 MIXED HYPERLIPIDEMIA: ICD-10-CM

## 2022-03-16 DIAGNOSIS — Z00.00 PREVENTATIVE HEALTH CARE: ICD-10-CM

## 2022-03-16 DIAGNOSIS — Z87.891 QUIT SMOKING WITHIN PAST YEAR: ICD-10-CM

## 2022-03-16 DIAGNOSIS — Z12.31 BREAST CANCER SCREENING BY MAMMOGRAM: ICD-10-CM

## 2022-03-16 PROCEDURE — 99214 OFFICE O/P EST MOD 30 MIN: CPT | Performed by: NURSE PRACTITIONER

## 2022-03-16 RX ORDER — LISINOPRIL 20 MG/1
20 TABLET ORAL
Qty: 90 TABLET | Refills: 0 | Status: SHIPPED | OUTPATIENT
Start: 2022-03-16 | End: 2022-06-13 | Stop reason: SDUPTHER

## 2022-03-16 RX ORDER — AMLODIPINE BESYLATE 10 MG/1
10 TABLET ORAL
Qty: 90 TABLET | Refills: 0 | Status: SHIPPED | OUTPATIENT
Start: 2022-03-16 | End: 2022-06-13

## 2022-03-16 RX ORDER — ATORVASTATIN CALCIUM 80 MG/1
80 TABLET, FILM COATED ORAL NIGHTLY
Qty: 90 TABLET | Refills: 0 | Status: SHIPPED | OUTPATIENT
Start: 2022-03-16 | End: 2022-04-05 | Stop reason: SDUPTHER

## 2022-03-16 NOTE — PROGRESS NOTES
Chief Complaint  Stroke (F/u from rehab) and Establish Care    Subjective          Cheyenne Headley presents to [facility] for   History of Present Illness    New patient presents today to establish care with new provider. She did not have a primary care provider prior to CVA. She has a new diagnosis of HTN, COPD, pmh of tobacco abuse-quit on 2/18/2022, and recent CVA. She denies having any hx of depression, but was discharged on sertraline. Her  is ill has been in hospital as well since december, and she has been occupied visiting him.       She presented to [facility] on 2/18/2022 due to right-sided weakness and aphasia with initial BP of 193/100.     CT head 2/18/22 1317  - initially showed no acute process with mild chronic microvascular disease    CT angiogram head  - patent cerebral vasculature without aneurysms, no acute large vessel occlusions  -Left and right carotid stenosis approximately 50%  -Patent vertebral arteries    MRI brain 2/18/222  1535  -showed 1.8 cm acute/subacute infarct extending into from the left basal ganglia and periventricular posterior left frontal deep white matter without hemorrhagic transformation   -Mild to moderate chronic microvascular disease  -Mild atrophy    -Started on aspirin and statin.   -B12 level low, started B12 supplement  -Echocardiogram LVEF 61 to 65% with grade 1 diastolic dysfunction, no valvular pathology    With BP normalization, PT and OT, pt was stable for discharge.  She was discharged to [facility] rehab facility on 2/23/2022    Today she presents for follow-up, she has PT/OT and skilled nurse coming to home. She denies dysphagia, headache, nausea, vomiting, diarrhea, constipation.  She does report right sided visual changes and right-sided weakness.   bp is elevated today and running 150's at home, she denies chest pain, palpitations, swelling of the extremities and is taking amlodipine and lisinopril  as directed.     She has a neurology appointment with Dr. Seipel in .  Discharge recommended for vascular follow-up in 2 months        The following portions of the patient's history were reviewed and updated as appropriate: allergies, current medications, past family history, past medical history, past social history, past surgical history and problem list.    Past Medical History:   Diagnosis Date   • Depression    • Hypertension    • Tobacco abuse      Past Surgical History:   Procedure Laterality Date   • KNEE SURGERY       Family History   Problem Relation Age of Onset   • Hypertension Mother    • Stroke Mother    • Aneurysm Mother    • Breast cancer Mother    • Anuerysm Mother    • Cancer Mother    • No Known Problems Father    • No Known Problems Sister    • No Known Problems Brother    • No Known Problems Daughter    • No Known Problems Maternal Aunt    • No Known Problems Maternal Uncle    • No Known Problems Paternal Aunt    • No Known Problems Paternal Uncle    • Hypertension Maternal Grandmother    • No Known Problems Maternal Grandfather    • No Known Problems Paternal Grandmother    • No Known Problems Paternal Grandfather      Social History     Tobacco Use   • Smoking status: Former Smoker     Packs/day: 1.00     Years: 54.00     Pack years: 54.00     Types: Cigarettes     Start date:      Quit date:      Years since quittin.2   • Smokeless tobacco: Never Used   Substance Use Topics   • Alcohol use: Not Currently       Current Outpatient Medications:   •  amLODIPine (NORVASC) 10 MG tablet, Take 1 tablet by mouth Daily., Disp: 90 tablet, Rfl: 0  •  aspirin 325 MG tablet, Take 1 tablet by mouth Daily., Disp: , Rfl:   •  atorvastatin (LIPITOR) 80 MG tablet, Take 1 tablet by mouth Every Night., Disp: 90 tablet, Rfl: 0  •  lisinopril (PRINIVIL,ZESTRIL) 20 MG tablet, Take 1 tablet by mouth Daily., Disp: 90 tablet, Rfl: 0  •  sertraline (ZOLOFT) 50 MG tablet, Take 1 tablet by mouth Daily.,  "Disp: 90 tablet, Rfl: 0    Objective   Vital Signs:   /75 (BP Location: Left arm, Patient Position: Sitting, Cuff Size: Adult)   Pulse 67   Ht 172.7 cm (67.99\")   Wt 93.9 kg (207 lb)   SpO2 96%   BMI 31.48 kg/m²       Physical Exam  Vitals and nursing note reviewed.   Constitutional:       General: She is not in acute distress.     Appearance: She is well-developed. She is not ill-appearing or diaphoretic.   HENT:      Head: Normocephalic.      Nose: No congestion.   Eyes:      Pupils: Pupils are equal, round, and reactive to light.   Neck:      Thyroid: No thyromegaly.      Vascular: No JVD.   Cardiovascular:      Rate and Rhythm: Normal rate and regular rhythm.      Heart sounds: Normal heart sounds. No murmur heard.  Pulmonary:      Effort: Pulmonary effort is normal. No respiratory distress.      Breath sounds: Normal breath sounds. No wheezing or rhonchi.   Abdominal:      General: Bowel sounds are normal. There is no distension.      Palpations: Abdomen is soft.      Tenderness: There is no abdominal tenderness.   Musculoskeletal:         General: No tenderness. Normal range of motion.      Cervical back: Normal range of motion and neck supple.   Skin:     General: Skin is warm and dry.      Coloration: Skin is not pale.   Neurological:      Mental Status: She is alert and oriented to person, place, and time.      Sensory: No sensory deficit.      Motor: Weakness (mild right sided weakness) present.      Gait: Gait abnormal (uses walker for mobility).   Psychiatric:         Behavior: Behavior normal.         Thought Content: Thought content normal.         Judgment: Judgment normal.          Result Review :     No visits with results within 7 Day(s) from this visit.   Latest known visit with results is:   Lab Requisition on 03/07/2022   Component Date Value Ref Range Status   • Glucose 03/07/2022 90  65 - 99 mg/dL Final   • BUN 03/07/2022 11  8 - 23 mg/dL Final   • Creatinine 03/07/2022 0.70  0.57 - " 1.00 mg/dL Final   • Sodium 03/07/2022 139  136 - 145 mmol/L Final   • Potassium 03/07/2022 3.9  3.5 - 5.2 mmol/L Final    Slight hemolysis detected by analyzer. Results may be affected.   • Chloride 03/07/2022 106  98 - 107 mmol/L Final   • CO2 03/07/2022 22.0  22.0 - 29.0 mmol/L Final   • Calcium 03/07/2022 8.6  8.6 - 10.5 mg/dL Final   • BUN/Creatinine Ratio 03/07/2022 15.7  7.0 - 25.0 Final   • Anion Gap 03/07/2022 11.0  5.0 - 15.0 mmol/L Final   • eGFR 03/07/2022 92.0  >60.0 mL/min/1.73 Final    National Kidney Foundation and American Society of Nephrology (ASN) Task Force recommended calculation based on the Chronic Kidney Disease Epidemiology Collaboration (CKD-EPI) equation refit without adjustment for race.                       Assessment and Plan    Diagnoses and all orders for this visit:    1. Cerebrovascular accident (CVA) of left basal ganglia (HCC) (Primary)  Comments:  continue PT/OT per [facility], cont walker.   Orders:  -     atorvastatin (LIPITOR) 80 MG tablet; Take 1 tablet by mouth Every Night.  Dispense: 90 tablet; Refill: 0    2. Breast cancer screening by mammogram  -     Mammo Screening Digital Tomosynthesis Bilateral With CAD; Future    3. Mixed hyperlipidemia  Comments:  cont statin, consider wean to 40mg if lipids stable in future.     4. Primary hypertension  Comments:  inc amlodopine to 10mg, continue lisinopril 20mg.  Notify me if consistently greater than 140 systolic  Orders:  -     amLODIPine (NORVASC) 10 MG tablet; Take 1 tablet by mouth Daily.  Dispense: 90 tablet; Refill: 0  -     lisinopril (PRINIVIL,ZESTRIL) 20 MG tablet; Take 1 tablet by mouth Daily.  Dispense: 90 tablet; Refill: 0    5. Quit smoking within past year  Comments:  1800-quit-now for free patches, gum if needed.  Praised efforts of quitting smoking    6. Right sided weakness  Comments:  Continue PT/OT per [facility], continue walker    7. B12 deficiency  Comments:  Recommend continue B12 supplementation over-the-counter    8.  Preventative health care  Comments:  Ordered mammogram  We will collect fasting labs prior to next appointment  will discuss Cologuard and DEXA scan at next appointment    Other orders  -     sertraline (ZOLOFT) 50 MG tablet; Take 1 tablet by mouth Daily.  Dispense: 90 tablet; Refill: 0      Recommend continuation of sertraline for now, will likely wean in future.     I spent 30 minutes caring for Cheyenne Headley on this date of service. This time includes time spent by me in the following activities: preparing for the visit, reviewing tests, performing a medically appropriate examination and/or evaluation , counseling and educating the patient/family/caregiver, ordering medications, tests, or procedures and documenting information in the medical record        Follow Up     Return in about 3 months (around 6/16/2022) for Recheck CVA, HTN, lipids. .  Patient was given instructions and counseling regarding her condition or for health maintenance advice. Please see specific information pulled into the AVS if appropriate.        Part of this note may be an electronic transcription/translation of spoken language to printed text using the Dragon Dictation System

## 2022-04-05 ENCOUNTER — TELEPHONE (OUTPATIENT)
Dept: FAMILY MEDICINE CLINIC | Facility: CLINIC | Age: 72
End: 2022-04-05

## 2022-04-05 DIAGNOSIS — I63.81 CEREBROVASCULAR ACCIDENT (CVA) OF LEFT BASAL GANGLIA: ICD-10-CM

## 2022-04-05 RX ORDER — ATORVASTATIN CALCIUM 80 MG/1
80 TABLET, FILM COATED ORAL NIGHTLY
Qty: 90 TABLET | Refills: 0 | Status: SHIPPED | OUTPATIENT
Start: 2022-04-05 | End: 2022-06-27 | Stop reason: SDUPTHER

## 2022-04-05 NOTE — TELEPHONE ENCOUNTER
Caller: moses cliffordley    Relationship: Emergency Contact    Best call back number: [phone] (M) PATIENT GAVE ME VERBAL PERMISSION TO SPEAK WITH HER DAUGHTER WESLEY    Requested Prescriptions:   atorvastatin (LIPITOR) 80 MG tablet     Pharmacy where request should be sent: [address] [phone] [place] [phone] FX         Additional details provided by patient: PATIENT'S DAUGHTER CALLED TO REQUEST A MEDICATION REFILL ON PATIENT'S MEDICATION. WESLEY STATES THAT SHE HAS A 3 DAY SUPPLY LEFT.            Does the patient have less than a 3 day supply:  [] Yes  [x] No    Robina Samuels Rep   04/05/22 12:36 EDT         THANKS

## 2022-06-11 DIAGNOSIS — I10 PRIMARY HYPERTENSION: ICD-10-CM

## 2022-06-13 DIAGNOSIS — I10 PRIMARY HYPERTENSION: ICD-10-CM

## 2022-06-13 RX ORDER — AMLODIPINE BESYLATE 10 MG/1
TABLET ORAL
Qty: 90 TABLET | Refills: 0 | Status: SHIPPED | OUTPATIENT
Start: 2022-06-13 | End: 2022-06-27 | Stop reason: SDUPTHER

## 2022-06-13 RX ORDER — LISINOPRIL 20 MG/1
20 TABLET ORAL
Qty: 90 TABLET | Refills: 0 | Status: SHIPPED | OUTPATIENT
Start: 2022-06-13 | End: 2022-06-27 | Stop reason: SDUPTHER

## 2022-06-13 NOTE — TELEPHONE ENCOUNTER
Caller: wesley clifford    Relationship: Emergency Contact    Best call back number: [phone]    Requested Prescriptions:   Requested Prescriptions     Pending Prescriptions Disp Refills   • lisinopril (PRINIVIL,ZESTRIL) 20 MG tablet 90 tablet 0     Sig: Take 1 tablet by mouth Daily.        Pharmacy where request should be sent: [facility] [address] [place], [place] [phone] [place] [phone] FX     Additional details provided by patient: PATIENT HAS TWO LEFT.     Does the patient have less than a 3 day supply:  [x] Yes  [] No    Robina Parra Rep   06/13/22 12:34 EDT

## 2022-06-27 ENCOUNTER — OFFICE VISIT (OUTPATIENT)
Dept: FAMILY MEDICINE CLINIC | Facility: CLINIC | Age: 72
End: 2022-06-27

## 2022-06-27 VITALS
HEART RATE: 63 BPM | WEIGHT: 201.2 LBS | BODY MASS INDEX: 30.49 KG/M2 | SYSTOLIC BLOOD PRESSURE: 156 MMHG | OXYGEN SATURATION: 95 % | DIASTOLIC BLOOD PRESSURE: 74 MMHG | HEIGHT: 68 IN

## 2022-06-27 DIAGNOSIS — Z12.11 COLON CANCER SCREENING: ICD-10-CM

## 2022-06-27 DIAGNOSIS — I10 PRIMARY HYPERTENSION: ICD-10-CM

## 2022-06-27 DIAGNOSIS — E78.2 MIXED HYPERLIPIDEMIA: Primary | ICD-10-CM

## 2022-06-27 DIAGNOSIS — I63.81 CEREBROVASCULAR ACCIDENT (CVA) OF LEFT BASAL GANGLIA: ICD-10-CM

## 2022-06-27 DIAGNOSIS — Z12.31 BREAST CANCER SCREENING BY MAMMOGRAM: ICD-10-CM

## 2022-06-27 DIAGNOSIS — Z78.0 POSTMENOPAUSAL: ICD-10-CM

## 2022-06-27 DIAGNOSIS — F41.9 ANXIETY: ICD-10-CM

## 2022-06-27 PROCEDURE — 99214 OFFICE O/P EST MOD 30 MIN: CPT | Performed by: NURSE PRACTITIONER

## 2022-06-27 RX ORDER — ATORVASTATIN CALCIUM 80 MG/1
80 TABLET, FILM COATED ORAL NIGHTLY
Qty: 90 TABLET | Refills: 1 | Status: SHIPPED | OUTPATIENT
Start: 2022-06-27 | End: 2022-12-09 | Stop reason: HOSPADM

## 2022-06-27 RX ORDER — AMLODIPINE BESYLATE 10 MG/1
10 TABLET ORAL DAILY
Qty: 90 TABLET | Refills: 1 | Status: SHIPPED | OUTPATIENT
Start: 2022-06-27 | End: 2022-12-09 | Stop reason: HOSPADM

## 2022-06-27 RX ORDER — LISINOPRIL 20 MG/1
20 TABLET ORAL
Qty: 90 TABLET | Refills: 1 | Status: SHIPPED | OUTPATIENT
Start: 2022-06-27 | End: 2022-12-09 | Stop reason: HOSPADM

## 2022-06-27 NOTE — PROGRESS NOTES
"Chief Complaint  Chief Complaint   Patient presents with   • Hypertension   • Hyperlipidemia   • Cerebrovascular Accident   • Follow-up     3 mo.  Please make not to schedule mwv with next f/u.   • Med Refill     \"Little blue pill\"  she didn't know what it was for or what it's called.           Subjective          Cheyenne Headley presents to [facility] for   History of Present Illness     HTN, elevated today, reports bp runs 130's at home, feels stable on meds and takes as directed, denies chest pain, headache, shortness of air, palpitations and swelling of extremities.     Hyperlipidemia, The patient denies muscle aches, constipation, diarrhea, GI upset, fatigue, chest pain/pressure, exercise intolerance, dyspnea, palpitations, syncope and pedal edema.      CVA march 2022, patient canceled follow-up with neurology. She is overall doing well with mild R sided residual. She is smoking cigarettes again.     Anxiety, patient denies significant weight loss/gain, insomnia, hypersomnia, psychomotor agitation, psychomotor retardation, fatigue (loss of energy), feelings of worthlessness (guilt), impaired concentration (indecisiveness), thoughts of death or suicide.       Needs medication refills      The following portions of the patient's history were reviewed and updated as appropriate: allergies, current medications, past family history, past medical history, past social history, past surgical history and problem list.    Past Medical History:   Diagnosis Date   • Depression    • Hypertension    • Tobacco abuse      Past Surgical History:   Procedure Laterality Date   • KNEE SURGERY       Family History   Problem Relation Age of Onset   • Hypertension Mother    • Stroke Mother    • Aneurysm Mother    • Breast cancer Mother    • Anuerysm Mother    • Cancer Mother    • No Known Problems Father    • No Known Problems Sister    • No Known Problems Brother    • No Known Problems Daughter    • " "No Known Problems Maternal Aunt    • No Known Problems Maternal Uncle    • No Known Problems Paternal Aunt    • No Known Problems Paternal Uncle    • Hypertension Maternal Grandmother    • No Known Problems Maternal Grandfather    • No Known Problems Paternal Grandmother    • No Known Problems Paternal Grandfather      Social History     Tobacco Use   • Smoking status: Former Smoker     Packs/day: 1.00     Years: 54.00     Pack years: 54.00     Types: Cigarettes     Start date:      Quit date:      Years since quittin.4   • Smokeless tobacco: Never Used   Substance Use Topics   • Alcohol use: Not Currently       Current Outpatient Medications:   •  amLODIPine (NORVASC) 10 MG tablet, Take 1 tablet by mouth Daily., Disp: 90 tablet, Rfl: 1  •  aspirin 325 MG tablet, Take 1 tablet by mouth Daily., Disp: , Rfl:   •  atorvastatin (LIPITOR) 80 MG tablet, Take 1 tablet by mouth Every Night., Disp: 90 tablet, Rfl: 1  •  lisinopril (PRINIVIL,ZESTRIL) 20 MG tablet, Take 1 tablet by mouth Daily., Disp: 90 tablet, Rfl: 1  •  sertraline (ZOLOFT) 50 MG tablet, Take 1 tablet by mouth Daily., Disp: 90 tablet, Rfl: 1    Objective   Vital Signs:   /74 (BP Location: Left arm, Patient Position: Sitting, Cuff Size: Adult)   Pulse 63   Ht 172.7 cm (67.99\")   Wt 91.3 kg (201 lb 3.2 oz)   SpO2 95%   BMI 30.60 kg/m²           Physical Exam  Vitals and nursing note reviewed.   Constitutional:       General: She is not in acute distress.     Appearance: She is well-developed. She is not ill-appearing or diaphoretic.   HENT:      Head: Normocephalic.      Nose: No congestion.   Eyes:      Pupils: Pupils are equal, round, and reactive to light.   Neck:      Thyroid: No thyromegaly.      Vascular: No JVD.   Cardiovascular:      Rate and Rhythm: Normal rate and regular rhythm.      Heart sounds: Normal heart sounds. No murmur heard.  Pulmonary:      Effort: Pulmonary effort is normal. No respiratory distress.      Breath " sounds: Normal breath sounds. No wheezing or rhonchi.   Abdominal:      General: Bowel sounds are normal. There is no distension.      Palpations: Abdomen is soft.      Tenderness: There is no abdominal tenderness.   Musculoskeletal:         General: No tenderness. Normal range of motion.      Cervical back: Normal range of motion and neck supple.   Skin:     General: Skin is warm and dry.      Coloration: Skin is not pale.   Neurological:      Mental Status: She is alert and oriented to person, place, and time.      Sensory: No sensory deficit.      Motor: Weakness (mild right sided weakness ) present.      Gait: Gait abnormal (Not using walker today).   Psychiatric:         Behavior: Behavior normal.         Thought Content: Thought content normal.         Judgment: Judgment normal.          Result Review :     No visits with results within 7 Day(s) from this visit.   Latest known visit with results is:   Lab Requisition on 03/07/2022   Component Date Value Ref Range Status   • Glucose 03/07/2022 90  65 - 99 mg/dL Final   • BUN 03/07/2022 11  8 - 23 mg/dL Final   • Creatinine 03/07/2022 0.70  0.57 - 1.00 mg/dL Final   • Sodium 03/07/2022 139  136 - 145 mmol/L Final   • Potassium 03/07/2022 3.9  3.5 - 5.2 mmol/L Final    Slight hemolysis detected by analyzer. Results may be affected.   • Chloride 03/07/2022 106  98 - 107 mmol/L Final   • CO2 03/07/2022 22.0  22.0 - 29.0 mmol/L Final   • Calcium 03/07/2022 8.6  8.6 - 10.5 mg/dL Final   • BUN/Creatinine Ratio 03/07/2022 15.7  7.0 - 25.0 Final   • Anion Gap 03/07/2022 11.0  5.0 - 15.0 mmol/L Final   • eGFR 03/07/2022 92.0  >60.0 mL/min/1.73 Final    National Kidney Foundation and American Society of Nephrology (ASN) Task Force recommended calculation based on the Chronic Kidney Disease Epidemiology Collaboration (CKD-EPI) equation refit without adjustment for race.                  BMI is >= 30 and <35. (Class 1 Obesity). The following options were offered after  discussion;: exercise counseling/recommendations and nutrition counseling/recommendations           Assessment and Plan    Diagnoses and all orders for this visit:    1. Mixed hyperlipidemia (Primary)  Comments:  Continue atorvastatin.  Lipids in March stable    2. Primary hypertension  Comments:  Elevated today, bp reportedly stable at home, continue lisinopril and amlodipine.   Orders:  -     amLODIPine (NORVASC) 10 MG tablet; Take 1 tablet by mouth Daily.  Dispense: 90 tablet; Refill: 1  -     lisinopril (PRINIVIL,ZESTRIL) 20 MG tablet; Take 1 tablet by mouth Daily.  Dispense: 90 tablet; Refill: 1    3. Cerebrovascular accident (CVA) of left basal ganglia (HCC)  Comments:  overall doing well, recommended home exercises to work on daily for strengthening.  Consider another formal round of home PT. rec quit smoking.   Orders:  -     atorvastatin (LIPITOR) 80 MG tablet; Take 1 tablet by mouth Every Night.  Dispense: 90 tablet; Refill: 1    4. Breast cancer screening by mammogram  Comments:  Mammogram ordered earlier this year, scheduled but not completed yet    5. Colon cancer screening  -     Cologuard - Stool, Per Rectum; Future    6. Postmenopausal  Comments:  We will try to schedule DEXA scan at same time as mammogram.  Will notify patient results once received  Orders:  -     DEXA Bone Density Axial; Future    7. Anxiety  Comments:  Stable, continue and refill sertraline    Other orders  -     sertraline (ZOLOFT) 50 MG tablet; Take 1 tablet by mouth Daily.  Dispense: 90 tablet; Refill: 1          I spent 30 minutes caring for Cheyenne Headley on this date of service. This time includes time spent by me in the following activities: preparing for the visit, reviewing tests, performing a medically appropriate examination and/or evaluation , counseling and educating the patient/family/caregiver, ordering medications, tests, or procedures and documenting information in the medical record        Follow Up     Return  in about 6 months (around 12/27/2022) for Recheck.  Patient was given instructions and counseling regarding her condition or for health maintenance advice. Please see specific information pulled into the AVS if appropriate.        Part of this note may be an electronic transcription/translation of spoken language to printed text using the Dragon Dictation System

## 2022-06-28 ENCOUNTER — APPOINTMENT (OUTPATIENT)
Dept: MAMMOGRAPHY | Facility: HOSPITAL | Age: 72
End: 2022-06-28

## 2022-08-19 ENCOUNTER — TRANSCRIBE ORDERS (OUTPATIENT)
Dept: ADMINISTRATIVE | Facility: HOSPITAL | Age: 72
End: 2022-08-19

## 2022-08-19 DIAGNOSIS — E55.9 VITAMIN D DEFICIENCY DISEASE: ICD-10-CM

## 2022-08-19 DIAGNOSIS — E03.9 HYPOTHYROIDISM, UNSPECIFIED TYPE: Primary | ICD-10-CM

## 2022-08-19 DIAGNOSIS — E11.9 DIABETES MELLITUS WITHOUT COMPLICATION: ICD-10-CM

## 2022-08-19 DIAGNOSIS — I10 ESSENTIAL HYPERTENSION, BENIGN: ICD-10-CM

## 2022-09-29 ENCOUNTER — TELEPHONE (OUTPATIENT)
Dept: FAMILY MEDICINE CLINIC | Facility: CLINIC | Age: 72
End: 2022-09-29

## 2022-10-07 NOTE — PROGRESS NOTES
Chief Complaint  Establish Care (CVA/-Back in Feb.)    Subjective            Cheyenne Headley presents to [facility] NEUROLOGY for HOSPITAL F/U CVA  History of Present Illness    Pt had subcortical stroke with right hand weakness feb 2022  Found to have htn , 50% stenosis of carotid.   No hx diabetes  Pt smokes < one ppd  Cholesterol nearly normal  Has been taking bp medication and statins    Minimal residual            =====BHF VISIT 2/18/22=====    =======CT Angiogram Neck 2/18/22======   1. Patent cerebral vasculature without obvious abrupt, acute-appearing large vessel occlusion. No aneurysms are evident. 2. Bulky calcific atheromatous plaque in the region of each carotid bulb with suggestion of moderate grade stenoses bilaterally. Please see above discussion for specific details. 3. Patent codominant vertebral arteries bilaterally, as well as the basilar artery. 4. Numerous additional findings, both vascular and nonvascular, as enumerated above in great detail.      ========MRI Brain Without Contrast 2/18/22======   1. 1.8 cm acute or subacute infarct extending into from the left basal ganglia and the periventricular posterior left frontal deep white matter. No hemorrhagic transformation. 2. Mild to moderate chronic microvascular disease. 3. Mild atrophy.       =====CT Head Without Contrast Stroke Protocol 2/18/22======    1. No acute intracranial finding. 2. Mild chronic microvascular disease.      ======CT Angiogram Head w AI Analysis of LVO 2/18/22=======      1. Patent cerebral vasculature without obvious abrupt, acute-appearing large vessel occlusion. No aneurysms are evident. 2. Bulky calcific atheromatous plaque in the region of each carotid bulb with suggestion of moderate grade stenoses bilaterally. Please see above discussion for specific details. 3. Patent codominant vertebral arteries bilaterally, as well as the basilar artery. 4. Numerous additional findings, both vascular and  "nonvascular, as enumerated above in great detail.       =======CT CEREBRAL PERFUSION WITH & WITHOUT CONTRAST 2/18/22======  Negative cerebral perfusion study.           The patient is a pleasant 71-year-old female who was brought in because of some slurred speech.  There may have been some weakness but very nonspecific so she was brought in after initially she refused to come to the hospital.  She was evaluated and found to have some right-sided weakness and she was kept on leaning towards the right side     So she was not really a code stroke per se because her symptoms were variably more than 24 hours  She was not alteplase candidate     Her MRI demonstrated,  \"1. 1.8 cm acute or subacute infarct extending into from the left basal   ganglia and the periventricular posterior left frontal deep white   matter. No hemorrhagic transformation.   2. Mild to moderate chronic microvascular disease.   3. Mild atrophy.  \"     Her CTA showed moderate stenosis  Looking back at her CT and now the MRI, there were some changes present and she has moderate chronic small vessel changes already present     Her blood pressure is elevated today  Her B12 is suboptimal at 350  Hemoglobin A1c was okay but her LDL was elevated at 124 she had some hypoxia also and may be UTI also     As mentioned above she is not an alteplase or intervention candidate but definitely need to be treated for stroke and also likely hypertension     I would recommend B12 supplements also  And smoking cessation     Her exam was very sketchy because she really did not want to cooperate much maybe she does not like to go to the doctors or hospitals and maybe that is why she has never been diagnosed with  Hypertension and other issues     As far strokes are concerned, they may have been asymptomatic/silent so far  This is basal ganglia infarct which is usually related to small vessel disease         Cheyenne Headley is a 71 y.o. female with a past medical history " "of tobacco abuse who presented to [facility] on 2/18/2022 due to slurred speech.  Per daughter patient called her last night and states \"she felt funny.\" The daughter reports that her mother \"felt drunk.\"  She states this eventually went away.  The daughter reports that her mother called her back and she went over there and noticed that her mother's words were slurred.  She called EMS and needed a stroke test on her and patient refused to come to the hospital.  Today the patient called her daughter again and daughter went over there and noticed her mother was having right-sided weakness and kept leaning towards the right side.  She also reports her mother has been unable to control her bladder.  She explains that her mother has been very stressed because her stepdad is in the hospital and was diagnosed with esophageal cancer.  She denies that her mother has had a history of high blood pressure, TIA, or CVA.      In the ED, CT head showed no acute findings. CTA pending. CTA cerebral perfusion unremarkable. EKG showed Sinus rhythm, Left bundle branch block, ST elevation secondary to IVCD. All labs unremarkable upon admission. All vital signs stable upon admission except /100.  Hospitalist were contacted to admit patient for further care management.  Family History   Problem Relation Age of Onset   • Hypertension Mother    • Stroke Mother    • Aneurysm Mother    • Breast cancer Mother    • Anuerysm Mother    • Cancer Mother    • No Known Problems Father    • No Known Problems Sister    • No Known Problems Brother    • No Known Problems Daughter    • No Known Problems Maternal Aunt    • No Known Problems Maternal Uncle    • No Known Problems Paternal Aunt    • No Known Problems Paternal Uncle    • Hypertension Maternal Grandmother    • No Known Problems Maternal Grandfather    • No Known Problems Paternal Grandmother    • No Known Problems Paternal Grandfather        Past Medical History:   Diagnosis " "Date   • Depression    • Hypertension    • Tobacco abuse        Social History     Socioeconomic History   • Marital status:    Tobacco Use   • Smoking status: Every Day     Packs/day: 1.00     Years: 54.00     Pack years: 54.00     Types: Cigarettes     Start date:      Last attempt to quit:      Years since quittin.7   • Smokeless tobacco: Never   Substance and Sexual Activity   • Alcohol use: Not Currently   • Drug use: Not Currently         Current Outpatient Medications:   •  amLODIPine (NORVASC) 10 MG tablet, Take 1 tablet by mouth Daily., Disp: 90 tablet, Rfl: 1  •  aspirin 325 MG tablet, Take 1 tablet by mouth Daily., Disp: , Rfl:   •  atorvastatin (LIPITOR) 80 MG tablet, Take 1 tablet by mouth Every Night., Disp: 90 tablet, Rfl: 1  •  lisinopril (PRINIVIL,ZESTRIL) 20 MG tablet, Take 1 tablet by mouth Daily., Disp: 90 tablet, Rfl: 1  •  sertraline (ZOLOFT) 50 MG tablet, Take 1 tablet by mouth Daily., Disp: 90 tablet, Rfl: 1    Review of Systems   Constitutional: Negative for fatigue.   HENT: Negative for hearing loss.    Respiratory: Negative for apnea.    Cardiovascular: Negative for chest pain.   Neurological: Negative for dizziness, seizures, speech difficulty and weakness.   Psychiatric/Behavioral: Negative for hallucinations.   All other systems reviewed and are negative.           Objective   Vital Signs:   BP (!) 155/108   Pulse 71   Temp 98.2 °F (36.8 °C) (Temporal)   Ht 172.7 cm (67.99\")   Wt 87.5 kg (193 lb)   BMI 29.35 kg/m²     Physical Exam  Vitals reviewed.   Constitutional:       Appearance: Normal appearance.   HENT:      Nose: Nose normal.   Eyes:      Extraocular Movements: Extraocular movements intact.      Pupils: Pupils are equal, round, and reactive to light.   Cardiovascular:      Rate and Rhythm: Normal rate.      Pulses: Normal pulses.   Pulmonary:      Effort: Pulmonary effort is normal. No respiratory distress.   Musculoskeletal:      Cervical back: Normal " range of motion.   Neurological:      General: No focal deficit present.      Mental Status: She is alert and oriented to person, place, and time.      Coordination: Romberg Test normal.      Gait: Gait is intact.      Deep Tendon Reflexes:      Reflex Scores:       Bicep reflexes are 2+ on the right side and 2+ on the left side.       Patellar reflexes are 2+ on the right side and 2+ on the left side.  Psychiatric:         Mood and Affect: Mood normal.        Result Review :                Neurologic Exam     Mental Status   Oriented to person, place, and time.     Cranial Nerves     CN III, IV, VI   Pupils are equal, round, and reactive to light.    Gait, Coordination, and Reflexes     Gait  Gait: normal    Coordination   Romberg: negative    Reflexes   Right biceps: 2+  Left biceps: 2+  Right patellar: 2+  Left patellar: 2+             Assessment and Plan    Diagnoses and all orders for this visit:    1. History of stroke with current residual effects (Primary)    primary risk factors, smoking and htn  Pt encouraged to stop smoking, work with PCP re the htn  Continue asa,     Pt has asymptomatic carotid stenosis, at 50% will repeat cta after one year.       Follow Up   Return in about 6 months (around 4/10/2023).     Patient was given instructions and counseling regarding her condition or for health maintenance advice. Please see specific information pulled into the AVS if appropriate.         This document has been electronically signed by Joseph Seipel, MD on October 10, 2022 13:42 EDT

## 2022-10-10 ENCOUNTER — OFFICE VISIT (OUTPATIENT)
Dept: NEUROLOGY | Facility: CLINIC | Age: 72
End: 2022-10-10

## 2022-10-10 VITALS
TEMPERATURE: 98.2 F | WEIGHT: 193 LBS | HEIGHT: 68 IN | BODY MASS INDEX: 29.25 KG/M2 | HEART RATE: 71 BPM | SYSTOLIC BLOOD PRESSURE: 155 MMHG | DIASTOLIC BLOOD PRESSURE: 108 MMHG

## 2022-10-10 DIAGNOSIS — I69.30 HISTORY OF STROKE WITH CURRENT RESIDUAL EFFECTS: Primary | ICD-10-CM

## 2022-10-10 PROCEDURE — 99214 OFFICE O/P EST MOD 30 MIN: CPT | Performed by: PSYCHIATRY & NEUROLOGY

## 2022-11-24 ENCOUNTER — HOSPITAL ENCOUNTER (INPATIENT)
Facility: HOSPITAL | Age: 72
LOS: 15 days | Discharge: HOSPICE | End: 2022-12-09
Attending: EMERGENCY MEDICINE | Admitting: FAMILY MEDICINE

## 2022-11-24 ENCOUNTER — APPOINTMENT (OUTPATIENT)
Dept: CT IMAGING | Facility: HOSPITAL | Age: 72
End: 2022-11-24

## 2022-11-24 ENCOUNTER — APPOINTMENT (OUTPATIENT)
Dept: GENERAL RADIOLOGY | Facility: HOSPITAL | Age: 72
End: 2022-11-24

## 2022-11-24 DIAGNOSIS — R63.30 FEEDING DIFFICULTY: ICD-10-CM

## 2022-11-24 DIAGNOSIS — I63.9 CEREBROVASCULAR ACCIDENT (CVA), UNSPECIFIED MECHANISM: Primary | ICD-10-CM

## 2022-11-24 LAB
ABO GROUP BLD: NORMAL
ALBUMIN SERPL-MCNC: 4.3 G/DL (ref 3.5–5.2)
ALBUMIN/GLOB SERPL: 1.6 G/DL
ALP SERPL-CCNC: 121 U/L (ref 39–117)
ALT SERPL W P-5'-P-CCNC: 26 U/L (ref 1–33)
ANION GAP SERPL CALCULATED.3IONS-SCNC: 10 MMOL/L (ref 5–15)
APTT PPP: 27.9 SECONDS (ref 24–31)
AST SERPL-CCNC: 28 U/L (ref 1–32)
BASOPHILS # BLD AUTO: 0.1 10*3/MM3 (ref 0–0.2)
BASOPHILS NFR BLD AUTO: 1 % (ref 0–1.5)
BILIRUB SERPL-MCNC: 0.2 MG/DL (ref 0–1.2)
BLD GP AB SCN SERPL QL: NEGATIVE
BUN SERPL-MCNC: 12 MG/DL (ref 8–23)
BUN/CREAT SERPL: 17.6 (ref 7–25)
CALCIUM SPEC-SCNC: 9.3 MG/DL (ref 8.6–10.5)
CHLORIDE SERPL-SCNC: 107 MMOL/L (ref 98–107)
CO2 SERPL-SCNC: 24 MMOL/L (ref 22–29)
CREAT SERPL-MCNC: 0.68 MG/DL (ref 0.57–1)
DEPRECATED RDW RBC AUTO: 49.9 FL (ref 37–54)
EGFRCR SERPLBLD CKD-EPI 2021: 92.1 ML/MIN/1.73
EOSINOPHIL # BLD AUTO: 0.2 10*3/MM3 (ref 0–0.4)
EOSINOPHIL NFR BLD AUTO: 2.4 % (ref 0.3–6.2)
ERYTHROCYTE [DISTWIDTH] IN BLOOD BY AUTOMATED COUNT: 15.6 % (ref 12.3–15.4)
GLOBULIN UR ELPH-MCNC: 2.7 GM/DL
GLUCOSE BLDC GLUCOMTR-MCNC: 105 MG/DL (ref 70–105)
GLUCOSE SERPL-MCNC: 110 MG/DL (ref 65–99)
HCT VFR BLD AUTO: 41.8 % (ref 34–46.6)
HGB BLD-MCNC: 13.5 G/DL (ref 12–15.9)
HOLD SPECIMEN: NORMAL
HOLD SPECIMEN: NORMAL
INR PPP: 1.02 (ref 0.93–1.1)
LYMPHOCYTES # BLD AUTO: 1.8 10*3/MM3 (ref 0.7–3.1)
LYMPHOCYTES NFR BLD AUTO: 19.1 % (ref 19.6–45.3)
MCH RBC QN AUTO: 29.7 PG (ref 26.6–33)
MCHC RBC AUTO-ENTMCNC: 32.4 G/DL (ref 31.5–35.7)
MCV RBC AUTO: 91.9 FL (ref 79–97)
MONOCYTES # BLD AUTO: 0.7 10*3/MM3 (ref 0.1–0.9)
MONOCYTES NFR BLD AUTO: 7 % (ref 5–12)
NEUTROPHILS NFR BLD AUTO: 6.6 10*3/MM3 (ref 1.7–7)
NEUTROPHILS NFR BLD AUTO: 70.5 % (ref 42.7–76)
NRBC BLD AUTO-RTO: 0.1 /100 WBC (ref 0–0.2)
PLATELET # BLD AUTO: 216 10*3/MM3 (ref 140–450)
PMV BLD AUTO: 10.5 FL (ref 6–12)
POTASSIUM SERPL-SCNC: 3.9 MMOL/L (ref 3.5–5.2)
PROT SERPL-MCNC: 7 G/DL (ref 6–8.5)
PROTHROMBIN TIME: 10.5 SECONDS (ref 9.6–11.7)
RBC # BLD AUTO: 4.55 10*6/MM3 (ref 3.77–5.28)
RH BLD: POSITIVE
SODIUM SERPL-SCNC: 141 MMOL/L (ref 136–145)
T&S EXPIRATION DATE: NORMAL
TROPONIN T SERPL-MCNC: <0.01 NG/ML (ref 0–0.03)
WBC NRBC COR # BLD: 9.3 10*3/MM3 (ref 3.4–10.8)
WHOLE BLOOD HOLD COAG: NORMAL
WHOLE BLOOD HOLD SPECIMEN: NORMAL

## 2022-11-24 PROCEDURE — 86900 BLOOD TYPING SEROLOGIC ABO: CPT

## 2022-11-24 PROCEDURE — 84443 ASSAY THYROID STIM HORMONE: CPT | Performed by: STUDENT IN AN ORGANIZED HEALTH CARE EDUCATION/TRAINING PROGRAM

## 2022-11-24 PROCEDURE — 70498 CT ANGIOGRAPHY NECK: CPT

## 2022-11-24 PROCEDURE — 25010000002 ONDANSETRON PER 1 MG

## 2022-11-24 PROCEDURE — 86901 BLOOD TYPING SEROLOGIC RH(D): CPT

## 2022-11-24 PROCEDURE — 70450 CT HEAD/BRAIN W/O DYE: CPT

## 2022-11-24 PROCEDURE — 93005 ELECTROCARDIOGRAM TRACING: CPT | Performed by: EMERGENCY MEDICINE

## 2022-11-24 PROCEDURE — 85025 COMPLETE CBC W/AUTO DIFF WBC: CPT | Performed by: EMERGENCY MEDICINE

## 2022-11-24 PROCEDURE — 0042T HC CT CEREBRAL PERFUSION W/WO CONTRAST: CPT

## 2022-11-24 PROCEDURE — 86850 RBC ANTIBODY SCREEN: CPT | Performed by: EMERGENCY MEDICINE

## 2022-11-24 PROCEDURE — 71045 X-RAY EXAM CHEST 1 VIEW: CPT

## 2022-11-24 PROCEDURE — 82962 GLUCOSE BLOOD TEST: CPT

## 2022-11-24 PROCEDURE — 84484 ASSAY OF TROPONIN QUANT: CPT | Performed by: EMERGENCY MEDICINE

## 2022-11-24 PROCEDURE — 0 IOPAMIDOL PER 1 ML: Performed by: EMERGENCY MEDICINE

## 2022-11-24 PROCEDURE — 85610 PROTHROMBIN TIME: CPT | Performed by: EMERGENCY MEDICINE

## 2022-11-24 PROCEDURE — 70496 CT ANGIOGRAPHY HEAD: CPT

## 2022-11-24 PROCEDURE — 85730 THROMBOPLASTIN TIME PARTIAL: CPT | Performed by: EMERGENCY MEDICINE

## 2022-11-24 PROCEDURE — 4A03X5D MEASUREMENT OF ARTERIAL FLOW, INTRACRANIAL, EXTERNAL APPROACH: ICD-10-PCS | Performed by: EMERGENCY MEDICINE

## 2022-11-24 PROCEDURE — 80061 LIPID PANEL: CPT | Performed by: STUDENT IN AN ORGANIZED HEALTH CARE EDUCATION/TRAINING PROGRAM

## 2022-11-24 PROCEDURE — 86900 BLOOD TYPING SEROLOGIC ABO: CPT | Performed by: EMERGENCY MEDICINE

## 2022-11-24 PROCEDURE — 99285 EMERGENCY DEPT VISIT HI MDM: CPT

## 2022-11-24 PROCEDURE — 86901 BLOOD TYPING SEROLOGIC RH(D): CPT | Performed by: EMERGENCY MEDICINE

## 2022-11-24 PROCEDURE — 80053 COMPREHEN METABOLIC PANEL: CPT | Performed by: EMERGENCY MEDICINE

## 2022-11-24 RX ORDER — ONDANSETRON 2 MG/ML
INJECTION INTRAMUSCULAR; INTRAVENOUS
Status: COMPLETED
Start: 2022-11-24 | End: 2022-11-24

## 2022-11-24 RX ORDER — ASPIRIN 300 MG/1
300 SUPPOSITORY RECTAL ONCE
Status: COMPLETED | OUTPATIENT
Start: 2022-11-24 | End: 2022-11-24

## 2022-11-24 RX ORDER — ONDANSETRON 2 MG/ML
4 INJECTION INTRAMUSCULAR; INTRAVENOUS ONCE
Status: COMPLETED | OUTPATIENT
Start: 2022-11-24 | End: 2022-11-24

## 2022-11-24 RX ORDER — SODIUM CHLORIDE 0.9 % (FLUSH) 0.9 %
10 SYRINGE (ML) INJECTION AS NEEDED
Status: DISCONTINUED | OUTPATIENT
Start: 2022-11-24 | End: 2022-12-09 | Stop reason: HOSPADM

## 2022-11-24 RX ADMIN — ONDANSETRON 4 MG: 2 INJECTION INTRAMUSCULAR; INTRAVENOUS at 22:25

## 2022-11-24 RX ADMIN — IOPAMIDOL 50 ML: 755 INJECTION, SOLUTION INTRAVENOUS at 22:20

## 2022-11-24 RX ADMIN — IOPAMIDOL 100 ML: 755 INJECTION, SOLUTION INTRAVENOUS at 22:23

## 2022-11-24 RX ADMIN — ASPIRIN 300 MG: 300 SUPPOSITORY RECTAL at 23:37

## 2022-11-25 ENCOUNTER — APPOINTMENT (OUTPATIENT)
Dept: MRI IMAGING | Facility: HOSPITAL | Age: 72
End: 2022-11-25

## 2022-11-25 ENCOUNTER — APPOINTMENT (OUTPATIENT)
Dept: CARDIOLOGY | Facility: HOSPITAL | Age: 72
End: 2022-11-25

## 2022-11-25 LAB
ANION GAP SERPL CALCULATED.3IONS-SCNC: 10 MMOL/L (ref 5–15)
BUN SERPL-MCNC: 9 MG/DL (ref 8–23)
BUN/CREAT SERPL: 14.3 (ref 7–25)
CALCIUM SPEC-SCNC: 9.2 MG/DL (ref 8.6–10.5)
CHLORIDE SERPL-SCNC: 106 MMOL/L (ref 98–107)
CHOLEST SERPL-MCNC: 133 MG/DL (ref 0–200)
CO2 SERPL-SCNC: 27 MMOL/L (ref 22–29)
CREAT SERPL-MCNC: 0.63 MG/DL (ref 0.57–1)
DEPRECATED RDW RBC AUTO: 50.3 FL (ref 37–54)
EGFRCR SERPLBLD CKD-EPI 2021: 93.8 ML/MIN/1.73
ERYTHROCYTE [DISTWIDTH] IN BLOOD BY AUTOMATED COUNT: 15.5 % (ref 12.3–15.4)
GLUCOSE BLDC GLUCOMTR-MCNC: 111 MG/DL (ref 70–105)
GLUCOSE BLDC GLUCOMTR-MCNC: 112 MG/DL (ref 70–105)
GLUCOSE BLDC GLUCOMTR-MCNC: 99 MG/DL (ref 70–105)
GLUCOSE SERPL-MCNC: 115 MG/DL (ref 65–99)
HBA1C MFR BLD: 5.4 % (ref 3.5–5.6)
HCT VFR BLD AUTO: 42.6 % (ref 34–46.6)
HDLC SERPL-MCNC: 78 MG/DL (ref 40–60)
HGB BLD-MCNC: 13.7 G/DL (ref 12–15.9)
LDLC SERPL CALC-MCNC: 42 MG/DL (ref 0–100)
LDLC/HDLC SERPL: 0.55 {RATIO}
MCH RBC QN AUTO: 30 PG (ref 26.6–33)
MCHC RBC AUTO-ENTMCNC: 32.1 G/DL (ref 31.5–35.7)
MCV RBC AUTO: 93.5 FL (ref 79–97)
PLATELET # BLD AUTO: 207 10*3/MM3 (ref 140–450)
PMV BLD AUTO: 11.1 FL (ref 6–12)
POTASSIUM SERPL-SCNC: 4.2 MMOL/L (ref 3.5–5.2)
QT INTERVAL: 422 MS
RBC # BLD AUTO: 4.56 10*6/MM3 (ref 3.77–5.28)
SODIUM SERPL-SCNC: 143 MMOL/L (ref 136–145)
T4 FREE SERPL-MCNC: 0.98 NG/DL (ref 0.93–1.7)
TRIGL SERPL-MCNC: 61 MG/DL (ref 0–150)
TSH SERPL DL<=0.05 MIU/L-ACNC: 7.34 UIU/ML (ref 0.27–4.2)
VIT B12 BLD-MCNC: 406 PG/ML (ref 211–946)
VLDLC SERPL-MCNC: 13 MG/DL (ref 5–40)
WBC NRBC COR # BLD: 9.2 10*3/MM3 (ref 3.4–10.8)

## 2022-11-25 PROCEDURE — 82607 VITAMIN B-12: CPT | Performed by: STUDENT IN AN ORGANIZED HEALTH CARE EDUCATION/TRAINING PROGRAM

## 2022-11-25 PROCEDURE — 36415 COLL VENOUS BLD VENIPUNCTURE: CPT | Performed by: STUDENT IN AN ORGANIZED HEALTH CARE EDUCATION/TRAINING PROGRAM

## 2022-11-25 PROCEDURE — 82962 GLUCOSE BLOOD TEST: CPT

## 2022-11-25 PROCEDURE — 84439 ASSAY OF FREE THYROXINE: CPT | Performed by: PSYCHIATRY & NEUROLOGY

## 2022-11-25 PROCEDURE — 92610 EVALUATE SWALLOWING FUNCTION: CPT

## 2022-11-25 PROCEDURE — 97166 OT EVAL MOD COMPLEX 45 MIN: CPT

## 2022-11-25 PROCEDURE — 70551 MRI BRAIN STEM W/O DYE: CPT

## 2022-11-25 PROCEDURE — 80048 BASIC METABOLIC PNL TOTAL CA: CPT | Performed by: STUDENT IN AN ORGANIZED HEALTH CARE EDUCATION/TRAINING PROGRAM

## 2022-11-25 PROCEDURE — 83036 HEMOGLOBIN GLYCOSYLATED A1C: CPT | Performed by: STUDENT IN AN ORGANIZED HEALTH CARE EDUCATION/TRAINING PROGRAM

## 2022-11-25 PROCEDURE — 99222 1ST HOSP IP/OBS MODERATE 55: CPT | Performed by: PSYCHIATRY & NEUROLOGY

## 2022-11-25 PROCEDURE — 85027 COMPLETE CBC AUTOMATED: CPT | Performed by: STUDENT IN AN ORGANIZED HEALTH CARE EDUCATION/TRAINING PROGRAM

## 2022-11-25 PROCEDURE — 97162 PT EVAL MOD COMPLEX 30 MIN: CPT

## 2022-11-25 PROCEDURE — 92523 SPEECH SOUND LANG COMPREHEN: CPT

## 2022-11-25 RX ORDER — ASPIRIN 325 MG
325 TABLET ORAL DAILY
Status: DISCONTINUED | OUTPATIENT
Start: 2022-11-25 | End: 2022-11-25

## 2022-11-25 RX ORDER — ATORVASTATIN CALCIUM 40 MG/1
80 TABLET, FILM COATED ORAL NIGHTLY
Status: DISCONTINUED | OUTPATIENT
Start: 2022-11-25 | End: 2022-11-26

## 2022-11-25 RX ORDER — ONDANSETRON 2 MG/ML
4 INJECTION INTRAMUSCULAR; INTRAVENOUS EVERY 6 HOURS PRN
Status: DISCONTINUED | OUTPATIENT
Start: 2022-11-25 | End: 2022-12-09 | Stop reason: HOSPADM

## 2022-11-25 RX ORDER — ASPIRIN 81 MG/1
81 TABLET ORAL DAILY
Status: DISCONTINUED | OUTPATIENT
Start: 2022-11-25 | End: 2022-11-26

## 2022-11-25 RX ORDER — SODIUM CHLORIDE 0.9 % (FLUSH) 0.9 %
10 SYRINGE (ML) INJECTION EVERY 12 HOURS SCHEDULED
Status: DISCONTINUED | OUTPATIENT
Start: 2022-11-25 | End: 2022-12-09 | Stop reason: HOSPADM

## 2022-11-25 RX ORDER — ASPIRIN 300 MG/1
300 SUPPOSITORY RECTAL DAILY
Status: DISCONTINUED | OUTPATIENT
Start: 2022-11-25 | End: 2022-11-26

## 2022-11-25 RX ORDER — CLOPIDOGREL BISULFATE 75 MG/1
75 TABLET ORAL DAILY
Status: DISCONTINUED | OUTPATIENT
Start: 2022-11-25 | End: 2022-11-26

## 2022-11-25 RX ORDER — BISACODYL 10 MG
10 SUPPOSITORY, RECTAL RECTAL DAILY PRN
Status: DISCONTINUED | OUTPATIENT
Start: 2022-11-25 | End: 2022-12-09 | Stop reason: HOSPADM

## 2022-11-25 RX ORDER — SODIUM CHLORIDE 0.9 % (FLUSH) 0.9 %
10 SYRINGE (ML) INJECTION AS NEEDED
Status: DISCONTINUED | OUTPATIENT
Start: 2022-11-25 | End: 2022-12-09 | Stop reason: HOSPADM

## 2022-11-25 RX ORDER — SODIUM CHLORIDE 9 MG/ML
40 INJECTION, SOLUTION INTRAVENOUS AS NEEDED
Status: DISCONTINUED | OUTPATIENT
Start: 2022-11-25 | End: 2022-12-09 | Stop reason: HOSPADM

## 2022-11-25 RX ADMIN — ASPIRIN 300 MG: 300 SUPPOSITORY RECTAL at 11:22

## 2022-11-25 RX ADMIN — Medication 10 ML: at 20:41

## 2022-11-25 RX ADMIN — Medication 10 ML: at 03:04

## 2022-11-25 RX ADMIN — Medication 10 ML: at 10:23

## 2022-11-26 ENCOUNTER — APPOINTMENT (OUTPATIENT)
Dept: GENERAL RADIOLOGY | Facility: HOSPITAL | Age: 72
End: 2022-11-26

## 2022-11-26 ENCOUNTER — APPOINTMENT (OUTPATIENT)
Dept: CARDIOLOGY | Facility: HOSPITAL | Age: 72
End: 2022-11-26

## 2022-11-26 PROBLEM — I10 ESSENTIAL HYPERTENSION: Chronic | Status: ACTIVE | Noted: 2022-11-26

## 2022-11-26 PROBLEM — I65.23 CAROTID STENOSIS, BILATERAL: Chronic | Status: ACTIVE | Noted: 2022-11-26

## 2022-11-26 LAB
ANION GAP SERPL CALCULATED.3IONS-SCNC: 12 MMOL/L (ref 5–15)
BH CV ECHO MEAS - ACS: 1.74 CM
BH CV ECHO MEAS - AO MAX PG: 8.9 MMHG
BH CV ECHO MEAS - AO MEAN PG: 4.9 MMHG
BH CV ECHO MEAS - AO ROOT DIAM: 3.1 CM
BH CV ECHO MEAS - AO V2 MAX: 149.3 CM/SEC
BH CV ECHO MEAS - AO V2 VTI: 25.5 CM
BH CV ECHO MEAS - AVA(I,D): 4.7 CM2
BH CV ECHO MEAS - EDV(CUBED): 94.9 ML
BH CV ECHO MEAS - EDV(MOD-SP4): 52.6 ML
BH CV ECHO MEAS - EF(MOD-SP4): 66.9 %
BH CV ECHO MEAS - ESV(CUBED): 18.3 ML
BH CV ECHO MEAS - ESV(MOD-SP4): 17.4 ML
BH CV ECHO MEAS - FS: 42.2 %
BH CV ECHO MEAS - IVS/LVPW: 1.08 CM
BH CV ECHO MEAS - IVSD: 1.07 CM
BH CV ECHO MEAS - LA DIMENSION: 3.4 CM
BH CV ECHO MEAS - LV DIASTOLIC VOL/BSA (35-75): 27.6 CM2
BH CV ECHO MEAS - LV MASS(C)D: 163.6 GRAMS
BH CV ECHO MEAS - LV MAX PG: 5.2 MMHG
BH CV ECHO MEAS - LV MEAN PG: 2.6 MMHG
BH CV ECHO MEAS - LV SYSTOLIC VOL/BSA (12-30): 9.1 CM2
BH CV ECHO MEAS - LV V1 MAX: 113.8 CM/SEC
BH CV ECHO MEAS - LV V1 VTI: 24.6 CM
BH CV ECHO MEAS - LVIDD: 4.6 CM
BH CV ECHO MEAS - LVIDS: 2.6 CM
BH CV ECHO MEAS - LVOT AREA: 4.9 CM2
BH CV ECHO MEAS - LVOT DIAM: 2.49 CM
BH CV ECHO MEAS - LVPWD: 0.99 CM
BH CV ECHO MEAS - MV A MAX VEL: 125.2 CM/SEC
BH CV ECHO MEAS - MV DEC SLOPE: 442.8 CM/SEC2
BH CV ECHO MEAS - MV DEC TIME: 0.26 MSEC
BH CV ECHO MEAS - MV E MAX VEL: 116.1 CM/SEC
BH CV ECHO MEAS - MV E/A: 0.93
BH CV ECHO MEAS - MV MAX PG: 8.4 MMHG
BH CV ECHO MEAS - MV MEAN PG: 4.7 MMHG
BH CV ECHO MEAS - MV V2 VTI: 37.3 CM
BH CV ECHO MEAS - MVA(VTI): 3.2 CM2
BH CV ECHO MEAS - PA ACC TIME: 0.09 SEC
BH CV ECHO MEAS - PA PR(ACCEL): 40.1 MMHG
BH CV ECHO MEAS - PA V2 MAX: 142.2 CM/SEC
BH CV ECHO MEAS - RAP SYSTOLE: 8 MMHG
BH CV ECHO MEAS - RV MAX PG: 3.7 MMHG
BH CV ECHO MEAS - RV V1 MAX: 96.5 CM/SEC
BH CV ECHO MEAS - RV V1 VTI: 24.3 CM
BH CV ECHO MEAS - RVDD: 2.7 CM
BH CV ECHO MEAS - RVSP: 46.3 MMHG
BH CV ECHO MEAS - SI(MOD-SP4): 18.5 ML/M2
BH CV ECHO MEAS - SV(LVOT): 119.7 ML
BH CV ECHO MEAS - SV(MOD-SP4): 35.2 ML
BH CV ECHO MEAS - TR MAX PG: 38.3 MMHG
BH CV ECHO MEAS - TR MAX VEL: 309.3 CM/SEC
BUN SERPL-MCNC: 11 MG/DL (ref 8–23)
BUN/CREAT SERPL: 18.3 (ref 7–25)
CALCIUM SPEC-SCNC: 8.9 MG/DL (ref 8.6–10.5)
CHLORIDE SERPL-SCNC: 104 MMOL/L (ref 98–107)
CO2 SERPL-SCNC: 27 MMOL/L (ref 22–29)
CREAT SERPL-MCNC: 0.6 MG/DL (ref 0.57–1)
DEPRECATED RDW RBC AUTO: 51.2 FL (ref 37–54)
EGFRCR SERPLBLD CKD-EPI 2021: 94.9 ML/MIN/1.73
ERYTHROCYTE [DISTWIDTH] IN BLOOD BY AUTOMATED COUNT: 15.7 % (ref 12.3–15.4)
FOLATE SERPL-MCNC: 17.3 NG/ML (ref 4.78–24.2)
GLUCOSE SERPL-MCNC: 103 MG/DL (ref 65–99)
HCT VFR BLD AUTO: 41.4 % (ref 34–46.6)
HGB BLD-MCNC: 13.3 G/DL (ref 12–15.9)
MAXIMAL PREDICTED HEART RATE: 147 BPM
MCH RBC QN AUTO: 29.9 PG (ref 26.6–33)
MCHC RBC AUTO-ENTMCNC: 32.1 G/DL (ref 31.5–35.7)
MCV RBC AUTO: 93.1 FL (ref 79–97)
PLATELET # BLD AUTO: 208 10*3/MM3 (ref 140–450)
PMV BLD AUTO: 11.2 FL (ref 6–12)
POTASSIUM SERPL-SCNC: 4 MMOL/L (ref 3.5–5.2)
RBC # BLD AUTO: 4.44 10*6/MM3 (ref 3.77–5.28)
SODIUM SERPL-SCNC: 143 MMOL/L (ref 136–145)
STRESS TARGET HR: 125 BPM
WBC NRBC COR # BLD: 10.4 10*3/MM3 (ref 3.4–10.8)

## 2022-11-26 PROCEDURE — 36415 COLL VENOUS BLD VENIPUNCTURE: CPT | Performed by: STUDENT IN AN ORGANIZED HEALTH CARE EDUCATION/TRAINING PROGRAM

## 2022-11-26 PROCEDURE — 85027 COMPLETE CBC AUTOMATED: CPT | Performed by: STUDENT IN AN ORGANIZED HEALTH CARE EDUCATION/TRAINING PROGRAM

## 2022-11-26 PROCEDURE — 80048 BASIC METABOLIC PNL TOTAL CA: CPT | Performed by: STUDENT IN AN ORGANIZED HEALTH CARE EDUCATION/TRAINING PROGRAM

## 2022-11-26 PROCEDURE — 74018 RADEX ABDOMEN 1 VIEW: CPT

## 2022-11-26 PROCEDURE — 93306 TTE W/DOPPLER COMPLETE: CPT | Performed by: INTERNAL MEDICINE

## 2022-11-26 PROCEDURE — 97530 THERAPEUTIC ACTIVITIES: CPT

## 2022-11-26 PROCEDURE — 97110 THERAPEUTIC EXERCISES: CPT

## 2022-11-26 PROCEDURE — 93306 TTE W/DOPPLER COMPLETE: CPT

## 2022-11-26 PROCEDURE — 82746 ASSAY OF FOLIC ACID SERUM: CPT | Performed by: PSYCHIATRY & NEUROLOGY

## 2022-11-26 RX ORDER — CLOPIDOGREL BISULFATE 75 MG/1
75 TABLET ORAL DAILY
Status: DISCONTINUED | OUTPATIENT
Start: 2022-11-26 | End: 2022-11-30

## 2022-11-26 RX ORDER — ATORVASTATIN CALCIUM 40 MG/1
80 TABLET, FILM COATED ORAL NIGHTLY
Status: DISCONTINUED | OUTPATIENT
Start: 2022-11-26 | End: 2022-12-09 | Stop reason: HOSPADM

## 2022-11-26 RX ORDER — DEXTROSE AND SODIUM CHLORIDE 5; .45 G/100ML; G/100ML
75 INJECTION, SOLUTION INTRAVENOUS CONTINUOUS
Status: DISCONTINUED | OUTPATIENT
Start: 2022-11-26 | End: 2022-11-27

## 2022-11-26 RX ORDER — ASPIRIN 300 MG/1
300 SUPPOSITORY RECTAL DAILY
Status: DISCONTINUED | OUTPATIENT
Start: 2022-11-27 | End: 2022-12-03

## 2022-11-26 RX ORDER — ASPIRIN 81 MG/1
81 TABLET, CHEWABLE ORAL DAILY
Status: DISCONTINUED | OUTPATIENT
Start: 2022-11-27 | End: 2022-12-03

## 2022-11-26 RX ORDER — LISINOPRIL 5 MG/1
5 TABLET ORAL
Status: DISCONTINUED | OUTPATIENT
Start: 2022-11-26 | End: 2022-11-27

## 2022-11-26 RX ADMIN — Medication 10 ML: at 08:07

## 2022-11-26 RX ADMIN — DEXTROSE AND SODIUM CHLORIDE 75 ML/HR: 5; 450 INJECTION, SOLUTION INTRAVENOUS at 23:06

## 2022-11-26 RX ADMIN — Medication 10 ML: at 22:59

## 2022-11-26 RX ADMIN — ASPIRIN 300 MG: 300 SUPPOSITORY RECTAL at 08:06

## 2022-11-26 RX ADMIN — DEXTROSE AND SODIUM CHLORIDE 75 ML/HR: 5; 450 INJECTION, SOLUTION INTRAVENOUS at 09:32

## 2022-11-26 RX ADMIN — LISINOPRIL 5 MG: 5 TABLET ORAL at 23:00

## 2022-11-26 RX ADMIN — ATORVASTATIN CALCIUM 80 MG: 40 TABLET, FILM COATED ORAL at 22:59

## 2022-11-26 RX ADMIN — CLOPIDOGREL BISULFATE 75 MG: 75 TABLET ORAL at 17:40

## 2022-11-27 ENCOUNTER — APPOINTMENT (OUTPATIENT)
Dept: CARDIOLOGY | Facility: HOSPITAL | Age: 72
End: 2022-11-27

## 2022-11-27 LAB
ANION GAP SERPL CALCULATED.3IONS-SCNC: 9 MMOL/L (ref 5–15)
BACTERIA UR QL AUTO: ABNORMAL /HPF
BH CV XLRA MEAS LEFT DIST CCA EDV: -32.9 CM/SEC
BH CV XLRA MEAS LEFT DIST CCA PSV: -259 CM/SEC
BH CV XLRA MEAS LEFT DIST ICA EDV: -24.1 CM/SEC
BH CV XLRA MEAS LEFT DIST ICA PSV: -146 CM/SEC
BH CV XLRA MEAS LEFT ICA/CCA RATIO: 0.94
BH CV XLRA MEAS LEFT PROX CCA EDV: 27.4 CM/SEC
BH CV XLRA MEAS LEFT PROX CCA PSV: 199 CM/SEC
BH CV XLRA MEAS LEFT PROX ECA PSV: -273 CM/SEC
BH CV XLRA MEAS LEFT PROX ICA EDV: -25.2 CM/SEC
BH CV XLRA MEAS LEFT PROX ICA PSV: -151 CM/SEC
BH CV XLRA MEAS LEFT PROX SCLA PSV: 178 CM/SEC
BH CV XLRA MEAS LEFT VERTEBRAL A PSV: -58.2 CM/SEC
BH CV XLRA MEAS RIGHT DIST CCA EDV: 19.5 CM/SEC
BH CV XLRA MEAS RIGHT DIST CCA PSV: 226 CM/SEC
BH CV XLRA MEAS RIGHT DIST ICA EDV: -14.3 CM/SEC
BH CV XLRA MEAS RIGHT DIST ICA PSV: -69.7 CM/SEC
BH CV XLRA MEAS RIGHT ICA/CCA RATIO: -0.76
BH CV XLRA MEAS RIGHT MID ICA EDV: 21.3 CM/SEC
BH CV XLRA MEAS RIGHT MID ICA PSV: 75.8 CM/SEC
BH CV XLRA MEAS RIGHT PROX CCA EDV: 17.3 CM/SEC
BH CV XLRA MEAS RIGHT PROX CCA PSV: 140 CM/SEC
BH CV XLRA MEAS RIGHT PROX ECA PSV: 333 CM/SEC
BH CV XLRA MEAS RIGHT PROX ICA EDV: -28 CM/SEC
BH CV XLRA MEAS RIGHT PROX ICA PSV: -171 CM/SEC
BH CV XLRA MEAS RIGHT PROX SCLA PSV: 191 CM/SEC
BH CV XLRA MEAS RIGHT VERTEBRAL A PSV: -73.6 CM/SEC
BILIRUB UR QL STRIP: NEGATIVE
BUN SERPL-MCNC: 12 MG/DL (ref 8–23)
BUN/CREAT SERPL: 21.4 (ref 7–25)
CALCIUM SPEC-SCNC: 9.3 MG/DL (ref 8.6–10.5)
CHLORIDE SERPL-SCNC: 103 MMOL/L (ref 98–107)
CLARITY UR: CLEAR
CO2 SERPL-SCNC: 27 MMOL/L (ref 22–29)
COLOR UR: ABNORMAL
CREAT SERPL-MCNC: 0.56 MG/DL (ref 0.57–1)
DEPRECATED RDW RBC AUTO: 48.1 FL (ref 37–54)
EGFRCR SERPLBLD CKD-EPI 2021: 96.5 ML/MIN/1.73
ERYTHROCYTE [DISTWIDTH] IN BLOOD BY AUTOMATED COUNT: 15 % (ref 12.3–15.4)
GLUCOSE SERPL-MCNC: 156 MG/DL (ref 65–99)
GLUCOSE UR STRIP-MCNC: NEGATIVE MG/DL
HCT VFR BLD AUTO: 42.7 % (ref 34–46.6)
HGB BLD-MCNC: 13.6 G/DL (ref 12–15.9)
HGB UR QL STRIP.AUTO: ABNORMAL
HYALINE CASTS UR QL AUTO: ABNORMAL /LPF
KETONES UR QL STRIP: NEGATIVE
LEUKOCYTE ESTERASE UR QL STRIP.AUTO: ABNORMAL
MAXIMAL PREDICTED HEART RATE: 147 BPM
MCH RBC QN AUTO: 29.6 PG (ref 26.6–33)
MCHC RBC AUTO-ENTMCNC: 31.8 G/DL (ref 31.5–35.7)
MCV RBC AUTO: 93.1 FL (ref 79–97)
NITRITE UR QL STRIP: NEGATIVE
PH UR STRIP.AUTO: 7 [PH] (ref 5–8)
PLATELET # BLD AUTO: 200 10*3/MM3 (ref 140–450)
PMV BLD AUTO: 11.2 FL (ref 6–12)
POTASSIUM SERPL-SCNC: 3.8 MMOL/L (ref 3.5–5.2)
PROT UR QL STRIP: ABNORMAL
RBC # BLD AUTO: 4.59 10*6/MM3 (ref 3.77–5.28)
RBC # UR STRIP: ABNORMAL /HPF
REF LAB TEST METHOD: ABNORMAL
SODIUM SERPL-SCNC: 139 MMOL/L (ref 136–145)
SP GR UR STRIP: 1.02 (ref 1–1.03)
SQUAMOUS #/AREA URNS HPF: ABNORMAL /HPF
STRESS TARGET HR: 125 BPM
UROBILINOGEN UR QL STRIP: ABNORMAL
WBC # UR STRIP: ABNORMAL /HPF
WBC NRBC COR # BLD: 13.4 10*3/MM3 (ref 3.4–10.8)

## 2022-11-27 PROCEDURE — 93880 EXTRACRANIAL BILAT STUDY: CPT

## 2022-11-27 PROCEDURE — 85027 COMPLETE CBC AUTOMATED: CPT | Performed by: STUDENT IN AN ORGANIZED HEALTH CARE EDUCATION/TRAINING PROGRAM

## 2022-11-27 PROCEDURE — 80048 BASIC METABOLIC PNL TOTAL CA: CPT | Performed by: STUDENT IN AN ORGANIZED HEALTH CARE EDUCATION/TRAINING PROGRAM

## 2022-11-27 PROCEDURE — 92526 ORAL FUNCTION THERAPY: CPT

## 2022-11-27 PROCEDURE — 81001 URINALYSIS AUTO W/SCOPE: CPT | Performed by: FAMILY MEDICINE

## 2022-11-27 RX ORDER — LISINOPRIL 5 MG/1
5 TABLET ORAL
Status: DISCONTINUED | OUTPATIENT
Start: 2022-11-28 | End: 2022-12-02

## 2022-11-27 RX ADMIN — CLOPIDOGREL BISULFATE 75 MG: 75 TABLET ORAL at 10:27

## 2022-11-27 RX ADMIN — ATORVASTATIN CALCIUM 80 MG: 40 TABLET, FILM COATED ORAL at 22:10

## 2022-11-27 RX ADMIN — ASPIRIN 81 MG CHEWABLE TABLET 81 MG: 81 TABLET CHEWABLE at 10:27

## 2022-11-27 RX ADMIN — LISINOPRIL 5 MG: 5 TABLET ORAL at 10:27

## 2022-11-27 RX ADMIN — Medication 10 ML: at 22:07

## 2022-11-27 RX ADMIN — SERTRALINE 50 MG: 50 TABLET, FILM COATED ORAL at 10:27

## 2022-11-27 RX ADMIN — Medication 10 ML: at 10:27

## 2022-11-28 LAB
ANION GAP SERPL CALCULATED.3IONS-SCNC: 10 MMOL/L (ref 5–15)
BUN SERPL-MCNC: 12 MG/DL (ref 8–23)
BUN/CREAT SERPL: 21.4 (ref 7–25)
CALCIUM SPEC-SCNC: 9.5 MG/DL (ref 8.6–10.5)
CHLORIDE SERPL-SCNC: 101 MMOL/L (ref 98–107)
CO2 SERPL-SCNC: 28 MMOL/L (ref 22–29)
CREAT SERPL-MCNC: 0.56 MG/DL (ref 0.57–1)
DEPRECATED RDW RBC AUTO: 48.6 FL (ref 37–54)
EGFRCR SERPLBLD CKD-EPI 2021: 96.5 ML/MIN/1.73
ERYTHROCYTE [DISTWIDTH] IN BLOOD BY AUTOMATED COUNT: 15 % (ref 12.3–15.4)
GLUCOSE SERPL-MCNC: 133 MG/DL (ref 65–99)
HCT VFR BLD AUTO: 41.1 % (ref 34–46.6)
HGB BLD-MCNC: 13.2 G/DL (ref 12–15.9)
MAGNESIUM SERPL-MCNC: 1.8 MG/DL (ref 1.6–2.4)
MCH RBC QN AUTO: 29.9 PG (ref 26.6–33)
MCHC RBC AUTO-ENTMCNC: 32.1 G/DL (ref 31.5–35.7)
MCV RBC AUTO: 93.2 FL (ref 79–97)
PLATELET # BLD AUTO: 192 10*3/MM3 (ref 140–450)
PMV BLD AUTO: 10.7 FL (ref 6–12)
POTASSIUM SERPL-SCNC: 3.7 MMOL/L (ref 3.5–5.2)
RBC # BLD AUTO: 4.41 10*6/MM3 (ref 3.77–5.28)
SODIUM SERPL-SCNC: 139 MMOL/L (ref 136–145)
WBC NRBC COR # BLD: 15 10*3/MM3 (ref 3.4–10.8)

## 2022-11-28 PROCEDURE — 92526 ORAL FUNCTION THERAPY: CPT

## 2022-11-28 PROCEDURE — 83735 ASSAY OF MAGNESIUM: CPT | Performed by: FAMILY MEDICINE

## 2022-11-28 PROCEDURE — 36415 COLL VENOUS BLD VENIPUNCTURE: CPT | Performed by: STUDENT IN AN ORGANIZED HEALTH CARE EDUCATION/TRAINING PROGRAM

## 2022-11-28 PROCEDURE — 85027 COMPLETE CBC AUTOMATED: CPT | Performed by: STUDENT IN AN ORGANIZED HEALTH CARE EDUCATION/TRAINING PROGRAM

## 2022-11-28 PROCEDURE — 80048 BASIC METABOLIC PNL TOTAL CA: CPT | Performed by: FAMILY MEDICINE

## 2022-11-28 RX ORDER — ACETAMINOPHEN 325 MG/1
650 TABLET ORAL EVERY 6 HOURS
Status: DISPENSED | OUTPATIENT
Start: 2022-11-28 | End: 2022-11-29

## 2022-11-28 RX ORDER — HYDRALAZINE HYDROCHLORIDE 20 MG/ML
10 INJECTION INTRAMUSCULAR; INTRAVENOUS EVERY 6 HOURS PRN
Status: DISCONTINUED | OUTPATIENT
Start: 2022-11-28 | End: 2022-12-09 | Stop reason: HOSPADM

## 2022-11-28 RX ORDER — LABETALOL HYDROCHLORIDE 5 MG/ML
10 INJECTION, SOLUTION INTRAVENOUS
Status: DISCONTINUED | OUTPATIENT
Start: 2022-11-28 | End: 2022-12-09 | Stop reason: HOSPADM

## 2022-11-28 RX ADMIN — ATORVASTATIN CALCIUM 80 MG: 40 TABLET, FILM COATED ORAL at 21:58

## 2022-11-28 RX ADMIN — ASPIRIN 81 MG CHEWABLE TABLET 81 MG: 81 TABLET CHEWABLE at 08:57

## 2022-11-28 RX ADMIN — Medication 10 ML: at 08:58

## 2022-11-28 RX ADMIN — ACETAMINOPHEN 650 MG: 325 TABLET, FILM COATED ORAL at 15:36

## 2022-11-28 RX ADMIN — SERTRALINE 50 MG: 50 TABLET, FILM COATED ORAL at 08:57

## 2022-11-28 RX ADMIN — ACETAMINOPHEN 650 MG: 325 TABLET, FILM COATED ORAL at 21:58

## 2022-11-28 RX ADMIN — CLOPIDOGREL BISULFATE 75 MG: 75 TABLET ORAL at 08:57

## 2022-11-28 RX ADMIN — Medication 10 ML: at 21:58

## 2022-11-28 RX ADMIN — LISINOPRIL 5 MG: 5 TABLET ORAL at 08:58

## 2022-11-29 ENCOUNTER — APPOINTMENT (OUTPATIENT)
Dept: CT IMAGING | Facility: HOSPITAL | Age: 72
End: 2022-11-29

## 2022-11-29 ENCOUNTER — APPOINTMENT (OUTPATIENT)
Dept: GENERAL RADIOLOGY | Facility: HOSPITAL | Age: 72
End: 2022-11-29

## 2022-11-29 LAB
ALBUMIN SERPL-MCNC: 3.8 G/DL (ref 3.5–5.2)
ALBUMIN/GLOB SERPL: 1.2 G/DL
ALP SERPL-CCNC: 120 U/L (ref 39–117)
ALT SERPL W P-5'-P-CCNC: 30 U/L (ref 1–33)
ANION GAP SERPL CALCULATED.3IONS-SCNC: 10 MMOL/L (ref 5–15)
AST SERPL-CCNC: 35 U/L (ref 1–32)
BASOPHILS # BLD AUTO: 0.1 10*3/MM3 (ref 0–0.2)
BASOPHILS NFR BLD AUTO: 0.3 % (ref 0–1.5)
BILIRUB SERPL-MCNC: 0.6 MG/DL (ref 0–1.2)
BUN SERPL-MCNC: 16 MG/DL (ref 8–23)
BUN/CREAT SERPL: 27.6 (ref 7–25)
CALCIUM SPEC-SCNC: 9.8 MG/DL (ref 8.6–10.5)
CHLORIDE SERPL-SCNC: 102 MMOL/L (ref 98–107)
CO2 SERPL-SCNC: 29 MMOL/L (ref 22–29)
CREAT SERPL-MCNC: 0.58 MG/DL (ref 0.57–1)
DEPRECATED RDW RBC AUTO: 47.7 FL (ref 37–54)
EGFRCR SERPLBLD CKD-EPI 2021: 95.7 ML/MIN/1.73
EOSINOPHIL # BLD AUTO: 0 10*3/MM3 (ref 0–0.4)
EOSINOPHIL NFR BLD AUTO: 0.3 % (ref 0.3–6.2)
ERYTHROCYTE [DISTWIDTH] IN BLOOD BY AUTOMATED COUNT: 14.8 % (ref 12.3–15.4)
GLOBULIN UR ELPH-MCNC: 3.3 GM/DL
GLUCOSE SERPL-MCNC: 159 MG/DL (ref 65–99)
HCT VFR BLD AUTO: 41.1 % (ref 34–46.6)
HGB BLD-MCNC: 13.1 G/DL (ref 12–15.9)
LYMPHOCYTES # BLD AUTO: 1 10*3/MM3 (ref 0.7–3.1)
LYMPHOCYTES NFR BLD AUTO: 6.7 % (ref 19.6–45.3)
MAGNESIUM SERPL-MCNC: 2 MG/DL (ref 1.6–2.4)
MCH RBC QN AUTO: 29.8 PG (ref 26.6–33)
MCHC RBC AUTO-ENTMCNC: 32 G/DL (ref 31.5–35.7)
MCV RBC AUTO: 93.1 FL (ref 79–97)
MONOCYTES # BLD AUTO: 1.2 10*3/MM3 (ref 0.1–0.9)
MONOCYTES NFR BLD AUTO: 7.5 % (ref 5–12)
NEUTROPHILS NFR BLD AUTO: 13.1 10*3/MM3 (ref 1.7–7)
NEUTROPHILS NFR BLD AUTO: 85.2 % (ref 42.7–76)
NRBC BLD AUTO-RTO: 0 /100 WBC (ref 0–0.2)
PHOSPHATE SERPL-MCNC: 2.7 MG/DL (ref 2.5–4.5)
PLATELET # BLD AUTO: 207 10*3/MM3 (ref 140–450)
PMV BLD AUTO: 10.9 FL (ref 6–12)
POTASSIUM SERPL-SCNC: 3.7 MMOL/L (ref 3.5–5.2)
PROT SERPL-MCNC: 7.1 G/DL (ref 6–8.5)
RBC # BLD AUTO: 4.42 10*6/MM3 (ref 3.77–5.28)
SODIUM SERPL-SCNC: 141 MMOL/L (ref 136–145)
WBC NRBC COR # BLD: 15.4 10*3/MM3 (ref 3.4–10.8)

## 2022-11-29 PROCEDURE — 71045 X-RAY EXAM CHEST 1 VIEW: CPT

## 2022-11-29 PROCEDURE — 85025 COMPLETE CBC W/AUTO DIFF WBC: CPT | Performed by: INTERNAL MEDICINE

## 2022-11-29 PROCEDURE — 97112 NEUROMUSCULAR REEDUCATION: CPT | Performed by: PHYSICAL THERAPIST

## 2022-11-29 PROCEDURE — 83735 ASSAY OF MAGNESIUM: CPT | Performed by: INTERNAL MEDICINE

## 2022-11-29 PROCEDURE — 70490 CT SOFT TISSUE NECK W/O DYE: CPT

## 2022-11-29 PROCEDURE — 70450 CT HEAD/BRAIN W/O DYE: CPT

## 2022-11-29 PROCEDURE — 92526 ORAL FUNCTION THERAPY: CPT

## 2022-11-29 PROCEDURE — 97530 THERAPEUTIC ACTIVITIES: CPT | Performed by: PHYSICAL THERAPIST

## 2022-11-29 PROCEDURE — 84100 ASSAY OF PHOSPHORUS: CPT | Performed by: INTERNAL MEDICINE

## 2022-11-29 PROCEDURE — 80053 COMPREHEN METABOLIC PANEL: CPT | Performed by: INTERNAL MEDICINE

## 2022-11-29 PROCEDURE — 25010000002 HYDRALAZINE PER 20 MG: Performed by: INTERNAL MEDICINE

## 2022-11-29 RX ORDER — LIDOCAINE 50 MG/G
1 PATCH TOPICAL
Status: DISCONTINUED | OUTPATIENT
Start: 2022-11-29 | End: 2022-12-09 | Stop reason: HOSPADM

## 2022-11-29 RX ADMIN — HYDRALAZINE HYDROCHLORIDE 10 MG: 20 INJECTION INTRAMUSCULAR; INTRAVENOUS at 05:59

## 2022-11-29 RX ADMIN — Medication 10 ML: at 09:10

## 2022-11-29 RX ADMIN — ACETAMINOPHEN 650 MG: 325 TABLET, FILM COATED ORAL at 09:09

## 2022-11-29 RX ADMIN — ATORVASTATIN CALCIUM 80 MG: 40 TABLET, FILM COATED ORAL at 21:51

## 2022-11-29 RX ADMIN — CLOPIDOGREL BISULFATE 75 MG: 75 TABLET ORAL at 09:09

## 2022-11-29 RX ADMIN — Medication 10 ML: at 21:51

## 2022-11-29 RX ADMIN — LIDOCAINE 1 PATCH: 50 PATCH CUTANEOUS at 13:03

## 2022-11-29 RX ADMIN — SERTRALINE 50 MG: 50 TABLET, FILM COATED ORAL at 09:09

## 2022-11-29 RX ADMIN — ASPIRIN 81 MG CHEWABLE TABLET 81 MG: 81 TABLET CHEWABLE at 09:09

## 2022-11-29 RX ADMIN — LISINOPRIL 5 MG: 5 TABLET ORAL at 09:09

## 2022-11-30 ENCOUNTER — INPATIENT HOSPITAL (OUTPATIENT)
Dept: URBAN - METROPOLITAN AREA HOSPITAL 84 | Facility: HOSPITAL | Age: 72
End: 2022-11-30

## 2022-11-30 ENCOUNTER — APPOINTMENT (OUTPATIENT)
Dept: ULTRASOUND IMAGING | Facility: HOSPITAL | Age: 72
End: 2022-11-30

## 2022-11-30 DIAGNOSIS — R63.39 OTHER FEEDING DIFFICULTIES: ICD-10-CM

## 2022-11-30 DIAGNOSIS — Z46.59 ENCOUNTER FOR FITTING AND ADJUSTMENT OF OTHER GASTROINTESTIN: ICD-10-CM

## 2022-11-30 DIAGNOSIS — I10 ESSENTIAL (PRIMARY) HYPERTENSION: ICD-10-CM

## 2022-11-30 DIAGNOSIS — I69.320 APHASIA FOLLOWING CEREBRAL INFARCTION: ICD-10-CM

## 2022-11-30 DIAGNOSIS — R74.01 ELEVATION OF LEVELS OF LIVER TRANSAMINASE LEVELS: ICD-10-CM

## 2022-11-30 LAB
ALBUMIN SERPL-MCNC: 3.5 G/DL (ref 3.5–5.2)
ALBUMIN/GLOB SERPL: 1 G/DL
ALP SERPL-CCNC: 128 U/L (ref 39–117)
ALPHA1 GLOB MFR UR ELPH: 286 MG/DL (ref 90–200)
ALT SERPL W P-5'-P-CCNC: 67 U/L (ref 1–33)
ANION GAP SERPL CALCULATED.3IONS-SCNC: 10 MMOL/L (ref 5–15)
AST SERPL-CCNC: 85 U/L (ref 1–32)
BASOPHILS # BLD AUTO: 0 10*3/MM3 (ref 0–0.2)
BASOPHILS NFR BLD AUTO: 0.4 % (ref 0–1.5)
BILIRUB SERPL-MCNC: 0.5 MG/DL (ref 0–1.2)
BUN SERPL-MCNC: 21 MG/DL (ref 8–23)
BUN/CREAT SERPL: 32.3 (ref 7–25)
CALCIUM SPEC-SCNC: 9.8 MG/DL (ref 8.6–10.5)
CERULOPLASMIN SERPL-MCNC: 36 MG/DL (ref 19–39)
CHLORIDE SERPL-SCNC: 104 MMOL/L (ref 98–107)
CO2 SERPL-SCNC: 28 MMOL/L (ref 22–29)
CREAT SERPL-MCNC: 0.65 MG/DL (ref 0.57–1)
DEPRECATED RDW RBC AUTO: 49.4 FL (ref 37–54)
EGFRCR SERPLBLD CKD-EPI 2021: 93.1 ML/MIN/1.73
EOSINOPHIL # BLD AUTO: 0.1 10*3/MM3 (ref 0–0.4)
EOSINOPHIL NFR BLD AUTO: 0.4 % (ref 0.3–6.2)
ERYTHROCYTE [DISTWIDTH] IN BLOOD BY AUTOMATED COUNT: 14.8 % (ref 12.3–15.4)
FERRITIN SERPL-MCNC: 279.9 NG/ML (ref 13–150)
GGT SERPL-CCNC: 35 U/L (ref 5–36)
GLOBULIN UR ELPH-MCNC: 3.4 GM/DL
GLUCOSE SERPL-MCNC: 168 MG/DL (ref 65–99)
HAV IGM SERPL QL IA: NORMAL
HBV CORE IGM SERPL QL IA: NORMAL
HBV SURFACE AG SERPL QL IA: NORMAL
HCT VFR BLD AUTO: 38.1 % (ref 34–46.6)
HCV AB SER DONR QL: NORMAL
HGB BLD-MCNC: 12.5 G/DL (ref 12–15.9)
HOLD SPECIMEN: NORMAL
LYMPHOCYTES # BLD AUTO: 1.2 10*3/MM3 (ref 0.7–3.1)
LYMPHOCYTES NFR BLD AUTO: 9.5 % (ref 19.6–45.3)
MAGNESIUM SERPL-MCNC: 2.1 MG/DL (ref 1.6–2.4)
MCH RBC QN AUTO: 29.9 PG (ref 26.6–33)
MCHC RBC AUTO-ENTMCNC: 32.9 G/DL (ref 31.5–35.7)
MCV RBC AUTO: 90.9 FL (ref 79–97)
MONOCYTES # BLD AUTO: 1.1 10*3/MM3 (ref 0.1–0.9)
MONOCYTES NFR BLD AUTO: 8.8 % (ref 5–12)
NEUTROPHILS NFR BLD AUTO: 10.5 10*3/MM3 (ref 1.7–7)
NEUTROPHILS NFR BLD AUTO: 80.9 % (ref 42.7–76)
NRBC BLD AUTO-RTO: 0 /100 WBC (ref 0–0.2)
PHOSPHATE SERPL-MCNC: 3.5 MG/DL (ref 2.5–4.5)
PLATELET # BLD AUTO: 226 10*3/MM3 (ref 140–450)
PMV BLD AUTO: 10.9 FL (ref 6–12)
POTASSIUM SERPL-SCNC: 3.9 MMOL/L (ref 3.5–5.2)
PROT SERPL-MCNC: 6.9 G/DL (ref 6–8.5)
RBC # BLD AUTO: 4.19 10*6/MM3 (ref 3.77–5.28)
SODIUM SERPL-SCNC: 142 MMOL/L (ref 136–145)
WBC NRBC COR # BLD: 12.9 10*3/MM3 (ref 3.4–10.8)
WHOLE BLOOD HOLD SPECIMEN: NORMAL

## 2022-11-30 PROCEDURE — 25010000002 HYDRALAZINE PER 20 MG: Performed by: INTERNAL MEDICINE

## 2022-11-30 PROCEDURE — 85025 COMPLETE CBC W/AUTO DIFF WBC: CPT | Performed by: INTERNAL MEDICINE

## 2022-11-30 PROCEDURE — 92526 ORAL FUNCTION THERAPY: CPT

## 2022-11-30 PROCEDURE — 82977 ASSAY OF GGT: CPT | Performed by: NURSE PRACTITIONER

## 2022-11-30 PROCEDURE — 83735 ASSAY OF MAGNESIUM: CPT | Performed by: INTERNAL MEDICINE

## 2022-11-30 PROCEDURE — 82728 ASSAY OF FERRITIN: CPT | Performed by: NURSE PRACTITIONER

## 2022-11-30 PROCEDURE — 25010000002 ENOXAPARIN PER 10 MG: Performed by: NURSE PRACTITIONER

## 2022-11-30 PROCEDURE — 80053 COMPREHEN METABOLIC PANEL: CPT | Performed by: INTERNAL MEDICINE

## 2022-11-30 PROCEDURE — 86038 ANTINUCLEAR ANTIBODIES: CPT | Performed by: NURSE PRACTITIONER

## 2022-11-30 PROCEDURE — 86381 MITOCHONDRIAL ANTIBODY EACH: CPT | Performed by: NURSE PRACTITIONER

## 2022-11-30 PROCEDURE — 99222 1ST HOSP IP/OBS MODERATE 55: CPT | Performed by: NURSE PRACTITIONER

## 2022-11-30 PROCEDURE — 82390 ASSAY OF CERULOPLASMIN: CPT | Performed by: NURSE PRACTITIONER

## 2022-11-30 PROCEDURE — 84100 ASSAY OF PHOSPHORUS: CPT | Performed by: INTERNAL MEDICINE

## 2022-11-30 PROCEDURE — 76705 ECHO EXAM OF ABDOMEN: CPT

## 2022-11-30 PROCEDURE — 82103 ALPHA-1-ANTITRYPSIN TOTAL: CPT | Performed by: NURSE PRACTITIONER

## 2022-11-30 PROCEDURE — 80074 ACUTE HEPATITIS PANEL: CPT | Performed by: NURSE PRACTITIONER

## 2022-11-30 PROCEDURE — 86376 MICROSOMAL ANTIBODY EACH: CPT | Performed by: NURSE PRACTITIONER

## 2022-11-30 RX ORDER — ENOXAPARIN SODIUM 100 MG/ML
1 INJECTION SUBCUTANEOUS EVERY 12 HOURS
Status: DISCONTINUED | OUTPATIENT
Start: 2022-11-30 | End: 2022-12-03

## 2022-11-30 RX ADMIN — ASPIRIN 81 MG CHEWABLE TABLET 81 MG: 81 TABLET CHEWABLE at 08:42

## 2022-11-30 RX ADMIN — HYDRALAZINE HYDROCHLORIDE 10 MG: 20 INJECTION INTRAMUSCULAR; INTRAVENOUS at 08:48

## 2022-11-30 RX ADMIN — Medication 10 ML: at 08:45

## 2022-11-30 RX ADMIN — LIDOCAINE 1 PATCH: 50 PATCH CUTANEOUS at 08:41

## 2022-11-30 RX ADMIN — HYDRALAZINE HYDROCHLORIDE 10 MG: 20 INJECTION INTRAMUSCULAR; INTRAVENOUS at 23:12

## 2022-11-30 RX ADMIN — Medication 10 ML: at 20:01

## 2022-11-30 RX ADMIN — ATORVASTATIN CALCIUM 80 MG: 40 TABLET, FILM COATED ORAL at 20:00

## 2022-11-30 RX ADMIN — CLOPIDOGREL BISULFATE 75 MG: 75 TABLET ORAL at 08:42

## 2022-11-30 RX ADMIN — LISINOPRIL 5 MG: 5 TABLET ORAL at 08:42

## 2022-11-30 RX ADMIN — SERTRALINE 50 MG: 50 TABLET, FILM COATED ORAL at 08:42

## 2022-11-30 RX ADMIN — ENOXAPARIN SODIUM 80 MG: 100 INJECTION SUBCUTANEOUS at 17:13

## 2022-12-01 LAB
ALBUMIN SERPL-MCNC: 3.4 G/DL (ref 3.5–5.2)
ALBUMIN/GLOB SERPL: 1 G/DL
ALP SERPL-CCNC: 159 U/L (ref 39–117)
ALT SERPL W P-5'-P-CCNC: 127 U/L (ref 1–33)
ANION GAP SERPL CALCULATED.3IONS-SCNC: 10 MMOL/L (ref 5–15)
AST SERPL-CCNC: 134 U/L (ref 1–32)
BASOPHILS # BLD AUTO: 0 10*3/MM3 (ref 0–0.2)
BASOPHILS NFR BLD AUTO: 0.3 % (ref 0–1.5)
BILIRUB SERPL-MCNC: 0.4 MG/DL (ref 0–1.2)
BUN SERPL-MCNC: 24 MG/DL (ref 8–23)
BUN/CREAT SERPL: 34.8 (ref 7–25)
CALCIUM SPEC-SCNC: 9.7 MG/DL (ref 8.6–10.5)
CHLORIDE SERPL-SCNC: 108 MMOL/L (ref 98–107)
CO2 SERPL-SCNC: 29 MMOL/L (ref 22–29)
CREAT SERPL-MCNC: 0.69 MG/DL (ref 0.57–1)
DEPRECATED RDW RBC AUTO: 49.9 FL (ref 37–54)
EGFRCR SERPLBLD CKD-EPI 2021: 91.8 ML/MIN/1.73
EOSINOPHIL # BLD AUTO: 0.1 10*3/MM3 (ref 0–0.4)
EOSINOPHIL NFR BLD AUTO: 0.6 % (ref 0.3–6.2)
ERYTHROCYTE [DISTWIDTH] IN BLOOD BY AUTOMATED COUNT: 14.8 % (ref 12.3–15.4)
GLOBULIN UR ELPH-MCNC: 3.4 GM/DL
GLUCOSE SERPL-MCNC: 169 MG/DL (ref 65–99)
HCT VFR BLD AUTO: 37.6 % (ref 34–46.6)
HGB BLD-MCNC: 12.2 G/DL (ref 12–15.9)
LKM-1 AB SER-ACNC: 1.2 UNITS (ref 0–20)
LYMPHOCYTES # BLD AUTO: 1.2 10*3/MM3 (ref 0.7–3.1)
LYMPHOCYTES NFR BLD AUTO: 9.1 % (ref 19.6–45.3)
MAGNESIUM SERPL-MCNC: 2.3 MG/DL (ref 1.6–2.4)
MCH RBC QN AUTO: 29.7 PG (ref 26.6–33)
MCHC RBC AUTO-ENTMCNC: 32.5 G/DL (ref 31.5–35.7)
MCV RBC AUTO: 91.4 FL (ref 79–97)
MITOCHONDRIA M2 IGG SER-ACNC: <20 UNITS (ref 0–20)
MONOCYTES # BLD AUTO: 1.2 10*3/MM3 (ref 0.1–0.9)
MONOCYTES NFR BLD AUTO: 8.7 % (ref 5–12)
NEUTROPHILS NFR BLD AUTO: 10.8 10*3/MM3 (ref 1.7–7)
NEUTROPHILS NFR BLD AUTO: 81.3 % (ref 42.7–76)
NRBC BLD AUTO-RTO: 0.1 /100 WBC (ref 0–0.2)
PHOSPHATE SERPL-MCNC: 3.6 MG/DL (ref 2.5–4.5)
PLATELET # BLD AUTO: 239 10*3/MM3 (ref 140–450)
PMV BLD AUTO: 10.8 FL (ref 6–12)
POTASSIUM SERPL-SCNC: 3.8 MMOL/L (ref 3.5–5.2)
PROT SERPL-MCNC: 6.8 G/DL (ref 6–8.5)
RBC # BLD AUTO: 4.11 10*6/MM3 (ref 3.77–5.28)
SODIUM SERPL-SCNC: 147 MMOL/L (ref 136–145)
WBC NRBC COR # BLD: 13.3 10*3/MM3 (ref 3.4–10.8)

## 2022-12-01 PROCEDURE — 97112 NEUROMUSCULAR REEDUCATION: CPT

## 2022-12-01 PROCEDURE — 83735 ASSAY OF MAGNESIUM: CPT | Performed by: INTERNAL MEDICINE

## 2022-12-01 PROCEDURE — 80053 COMPREHEN METABOLIC PANEL: CPT | Performed by: NURSE PRACTITIONER

## 2022-12-01 PROCEDURE — 97110 THERAPEUTIC EXERCISES: CPT

## 2022-12-01 PROCEDURE — 85025 COMPLETE CBC W/AUTO DIFF WBC: CPT | Performed by: NURSE PRACTITIONER

## 2022-12-01 PROCEDURE — 25010000002 ENOXAPARIN PER 10 MG: Performed by: NURSE PRACTITIONER

## 2022-12-01 PROCEDURE — 25010000002 HYDRALAZINE PER 20 MG: Performed by: INTERNAL MEDICINE

## 2022-12-01 PROCEDURE — 92526 ORAL FUNCTION THERAPY: CPT

## 2022-12-01 PROCEDURE — 84100 ASSAY OF PHOSPHORUS: CPT | Performed by: INTERNAL MEDICINE

## 2022-12-01 RX ADMIN — ASPIRIN 81 MG CHEWABLE TABLET 81 MG: 81 TABLET CHEWABLE at 09:31

## 2022-12-01 RX ADMIN — SERTRALINE 50 MG: 50 TABLET, FILM COATED ORAL at 09:31

## 2022-12-01 RX ADMIN — ATORVASTATIN CALCIUM 80 MG: 40 TABLET, FILM COATED ORAL at 20:02

## 2022-12-01 RX ADMIN — Medication 10 ML: at 09:32

## 2022-12-01 RX ADMIN — Medication 10 ML: at 20:02

## 2022-12-01 RX ADMIN — HYDRALAZINE HYDROCHLORIDE 10 MG: 20 INJECTION INTRAMUSCULAR; INTRAVENOUS at 05:33

## 2022-12-01 RX ADMIN — LIDOCAINE 1 PATCH: 50 PATCH CUTANEOUS at 09:31

## 2022-12-01 RX ADMIN — ENOXAPARIN SODIUM 80 MG: 100 INJECTION SUBCUTANEOUS at 16:22

## 2022-12-01 RX ADMIN — LISINOPRIL 5 MG: 5 TABLET ORAL at 09:31

## 2022-12-01 RX ADMIN — ENOXAPARIN SODIUM 80 MG: 100 INJECTION SUBCUTANEOUS at 04:07

## 2022-12-01 NOTE — THERAPY TREATMENT NOTE
Subjective: Pt agreeable to therapeutic plan of care. Plans on PEG placement next Monday    Objective:     Bed mobility - SBA and Min-A supine>sit  Transfers - Min-A and with rolling walker to CTS 2x and able to sidestep towards HOB  Ambulation -  feet N/A or Not attempted.    Vitals: WNL on 3.5 L O2    Pain: 0 VAS     Education: Provided education on the importance of mobility in the acute care setting, Verbal/Tactile Cues, Transfer Training and Stroke prevention and risk factors    Assessment: Cheyenne Estrella presents with functional mobility impairments which indicate the need for skilled intervention. Tolerating session today without incident. Pt more alert today and able to follow commands, nods yes/no. Sat EOB to perform seated LE exs. Able to stand at BS with min assist and rwx, sidestepped to HOB with min assist. Still has dobb faraz but getting PEG next Monday. Plans on acute rehab at [place].Will continue to follow and progress as tolerated.     Plan/Recommendations:   High Intensity Therapy recommended post-acute care. This is recommended as therapy feels the patient would require 5-6 days per week, 2-3 hours per day. At this time, inpatient rehabilitation (acute rehab) would be the first choice and SNF would be second.. Pt requires no DME at discharge.     Pt desires Inpatient Rehabilitation placement at discharge. Pt cooperative; agreeable to therapeutic recommendations and plan of care.         Basic Mobility 6-click:  Rollin = Total, A lot = 2, A little = 3; 4 = None  Supine>Sit:   1 = Total, A lot = 2, A little = 3; 4 = None   Sit>Stand with arms:  1 = Total, A lot = 2, A little = 3; 4 = None  Bed>Chair:   1 = Total, A lot = 2, A little = 3; 4 = None  Ambulate in room:  1 = Total, A lot = 2, A little = 3; 4 = None  3-5 Steps with railin = Total, A lot = 2, A little = 3; 4 = None  Score: 14    Modified Nahed: 4 = Moderately severe disability (Unable to attend to own bodily needs  without assistance, and unable to walk unassisted)     Post-Tx Position: Supine with HOB Elevated, Alarms activated and Call light and personal items within reach  PPE: gloves and surgical mask

## 2022-12-01 NOTE — PLAN OF CARE
Problem: Adult Inpatient Plan of Care  Goal: Plan of Care Review  Outcome: Ongoing, Progressing  Goal: Patient-Specific Goal (Individualized)  Outcome: Ongoing, Progressing  Goal: Absence of Hospital-Acquired Illness or Injury  Outcome: Ongoing, Progressing  Intervention: Identify and Manage Fall Risk  Recent Flowsheet Documentation  Taken 11/30/2022 2202 by Ana Daniels RN  Safety Promotion/Fall Prevention:   activity supervised   assistive device/personal items within reach   safety round/check completed   room organization consistent  Taken 11/30/2022 2000 by Ana Daniels RN  Safety Promotion/Fall Prevention:   activity supervised   safety round/check completed   room organization consistent   nonskid shoes/slippers when out of bed  Intervention: Prevent Skin Injury  Recent Flowsheet Documentation  Taken 11/30/2022 2000 by Ana Daniels RN  Skin Protection:   adhesive use limited   transparent dressing maintained   tubing/devices free from skin contact  Intervention: Prevent and Manage VTE (Venous Thromboembolism) Risk  Recent Flowsheet Documentation  Taken 11/30/2022 2000 by Ana Daniels RN  VTE Prevention/Management:   sequential compression devices on   bilateral  Intervention: Prevent Infection  Recent Flowsheet Documentation  Taken 11/30/2022 2202 by Ana Daniels RN  Infection Prevention:   single patient room provided   rest/sleep promoted   hand hygiene promoted   personal protective equipment utilized  Taken 11/30/2022 2000 by Ana Daniels RN  Infection Prevention:   environmental surveillance performed   hand hygiene promoted   single patient room provided   rest/sleep promoted   personal protective equipment utilized  Goal: Optimal Comfort and Wellbeing  Outcome: Ongoing, Progressing  Intervention: Provide Person-Centered Care  Recent Flowsheet Documentation  Taken 11/30/2022 2000 by Ana Daniels RN  Trust Relationship/Rapport:   care explained   choices provided   emotional support  provided  Goal: Readiness for Transition of Care  Outcome: Ongoing, Progressing     Problem: Adjustment to Illness (Stroke, Ischemic/Transient Ischemic Attack)  Goal: Optimal Coping  Outcome: Ongoing, Progressing  Intervention: Support Psychosocial Response to Stroke  Recent Flowsheet Documentation  Taken 11/30/2022 2000 by Ana Daniels RN  Supportive Measures: active listening utilized  Family/Support System Care:   caregiver stress acknowledged   self-care encouraged     Problem: Bowel Elimination Impaired (Stroke, Ischemic/Transient Ischemic Attack)  Goal: Effective Bowel Elimination  Outcome: Ongoing, Progressing     Problem: Cerebral Tissue Perfusion (Stroke, Ischemic/Transient Ischemic Attack)  Goal: Optimal Cerebral Tissue Perfusion  Outcome: Ongoing, Progressing  Intervention: Protect and Optimize Cerebral Perfusion  Recent Flowsheet Documentation  Taken 11/30/2022 2000 by Ana Daniels, RN  Stabilization Measures: legs elevated  Sensory Stimulation Regulation:   care clustered   lighting decreased  Cerebral Perfusion Promotion: blood pressure monitored     Problem: Cognitive Impairment (Stroke, Ischemic/Transient Ischemic Attack)  Goal: Optimal Cognitive Function  Outcome: Ongoing, Progressing  Intervention: Optimize Cognitive Function  Recent Flowsheet Documentation  Taken 11/30/2022 2000 by Ana Daniels, RN  Sensory Stimulation Regulation:   care clustered   lighting decreased  Reorientation Measures: clock in view  Environment Familiarity/Consistency: daily routine followed     Problem: Communication Impairment (Stroke, Ischemic/Transient Ischemic Attack)  Goal: Improved Communication Skills  Outcome: Ongoing, Progressing  Intervention: Optimize Communication Skills  Recent Flowsheet Documentation  Taken 11/30/2022 2000 by Ana Daniels, RN  Communication Enhancement Strategies:   call light answered in person   device use encouraged   communication device used     Problem: Functional Ability Impaired  (Stroke, Ischemic/Transient Ischemic Attack)  Goal: Optimal Functional Ability  Outcome: Ongoing, Progressing     Problem: Respiratory Compromise (Stroke, Ischemic/Transient Ischemic Attack)  Goal: Effective Oxygenation and Ventilation  Outcome: Ongoing, Progressing  Intervention: Optimize Oxygenation and Ventilation  Recent Flowsheet Documentation  Taken 11/30/2022 2000 by Ana Daniels RN  Airway/Ventilation Management:   airway patency maintained   calming measures promoted     Problem: Sensorimotor Impairment (Stroke, Ischemic/Transient Ischemic Attack)  Goal: Improved Sensorimotor Function  Outcome: Ongoing, Progressing  Intervention: Optimize Range of Motion, Motor Control and Function  Recent Flowsheet Documentation  Taken 11/30/2022 2000 by Ana Daniels RN  Spasticity Management: positioned with supportive device  Intervention: Optimize Sensory and Perceptual Ability  Recent Flowsheet Documentation  Taken 11/30/2022 2000 by Ana Daniels RN  Pressure Reduction Techniques:   frequent weight shift encouraged   weight shift assistance provided  Pressure Reduction Devices:   positioning supports utilized   pressure-redistributing mattress utilized     Problem: Swallowing Impairment (Stroke, Ischemic/Transient Ischemic Attack)  Goal: Optimal Eating and Swallowing without Aspiration  Outcome: Ongoing, Progressing  Intervention: Optimize Eating and Swallowing  Recent Flowsheet Documentation  Taken 11/30/2022 2000 by Ana Daniels RN  Aspiration Precautions: awake/alert before oral intake  Feeding/Eating Techniques:   close supervision provided   monitored for feeding stress cues   oral mucosa moistened     Problem: Urinary Elimination Impaired (Stroke, Ischemic/Transient Ischemic Attack)  Goal: Effective Urinary Elimination  Outcome: Ongoing, Progressing  Intervention: Promote Effective Bladder Elimination  Recent Flowsheet Documentation  Taken 11/30/2022 2000 by Ana Daniels RN  Urinary Elimination  Promotion: absorbent pad/diaper use encouraged     Problem: Asthma Comorbidity  Goal: Maintenance of Asthma Control  Outcome: Ongoing, Progressing  Intervention: Maintain Asthma Symptom Control  Recent Flowsheet Documentation  Taken 11/30/2022 2000 by Ana Daniels RN  Medication Review/Management: medications reviewed     Problem: Behavioral Health Comorbidity  Goal: Maintenance of Behavioral Health Symptom Control  Outcome: Ongoing, Progressing  Intervention: Maintain Behavioral Health Symptom Control  Recent Flowsheet Documentation  Taken 11/30/2022 2000 by Ana Daniels RN  Medication Review/Management: medications reviewed     Problem: COPD (Chronic Obstructive Pulmonary Disease) Comorbidity  Goal: Maintenance of COPD Symptom Control  Outcome: Ongoing, Progressing  Intervention: Maintain COPD-Symptom Control  Recent Flowsheet Documentation  Taken 11/30/2022 2000 by Ana Daniels RN  Supportive Measures: active listening utilized  Medication Review/Management: medications reviewed     Problem: Diabetes Comorbidity  Goal: Blood Glucose Level Within Targeted Range  Outcome: Ongoing, Progressing     Problem: Heart Failure Comorbidity  Goal: Maintenance of Heart Failure Symptom Control  Outcome: Ongoing, Progressing  Intervention: Maintain Heart Failure-Management  Recent Flowsheet Documentation  Taken 11/30/2022 2000 by Ana Daniels RN  Medication Review/Management: medications reviewed     Problem: Hypertension Comorbidity  Goal: Blood Pressure in Desired Range  Outcome: Ongoing, Progressing  Intervention: Maintain Blood Pressure Management  Recent Flowsheet Documentation  Taken 11/30/2022 2000 by Ana Daniels RN  Syncope Management: position changed slowly  Medication Review/Management: medications reviewed     Problem: Obstructive Sleep Apnea Risk or Actual Comorbidity Management  Goal: Unobstructed Breathing During Sleep  Outcome: Ongoing, Progressing  Intervention: Monitor and Manage Obstructive Sleep  Apnea  Recent Flowsheet Documentation  Taken 11/30/2022 2000 by Ana Daniels RN  NPPV/CPAP Maintenance: nasal prongs secure     Problem: Osteoarthritis Comorbidity  Goal: Maintenance of Osteoarthritis Symptom Control  Outcome: Ongoing, Progressing  Intervention: Maintain Osteoarthritis Symptom Control  Recent Flowsheet Documentation  Taken 11/30/2022 2000 by Ana Daniels RN  Medication Review/Management: medications reviewed     Problem: Pain Chronic (Persistent) (Comorbidity Management)  Goal: Acceptable Pain Control and Functional Ability  Outcome: Ongoing, Progressing  Intervention: Manage Persistent Pain  Recent Flowsheet Documentation  Taken 11/30/2022 2000 by Ana Daniels RN  Sleep/Rest Enhancement:   awakenings minimized   noise level reduced   therapeutic touch utilized  Medication Review/Management: medications reviewed  Intervention: Optimize Psychosocial Wellbeing  Recent Flowsheet Documentation  Taken 11/30/2022 2000 by Ana Daniels RN  Supportive Measures: active listening utilized  Diversional Activities: television  Family/Support System Care:   caregiver stress acknowledged   self-care encouraged     Problem: Seizure Disorder Comorbidity  Goal: Maintenance of Seizure Control  Outcome: Ongoing, Progressing  Intervention: Maintain Seizure-Symptom Control  Recent Flowsheet Documentation  Taken 11/30/2022 2000 by Ana Daniels RN  Seizure Precautions: activity supervised     Problem: Skin Injury Risk Increased  Goal: Skin Health and Integrity  Outcome: Ongoing, Progressing  Intervention: Promote and Optimize Oral Intake  Recent Flowsheet Documentation  Taken 11/30/2022 2000 by Ana Daniels RN  Oral Nutrition Promotion: medicated  Intervention: Optimize Skin Protection  Recent Flowsheet Documentation  Taken 11/30/2022 2000 by Ana Daniels, RN  Pressure Reduction Techniques:   frequent weight shift encouraged   weight shift assistance provided  Pressure Reduction Devices:   positioning supports  utilized   pressure-redistributing mattress utilized  Skin Protection:   adhesive use limited   transparent dressing maintained   tubing/devices free from skin contact     Problem: Fall Injury Risk  Goal: Absence of Fall and Fall-Related Injury  Outcome: Ongoing, Progressing  Intervention: Identify and Manage Contributors  Recent Flowsheet Documentation  Taken 11/30/2022 2000 by Ana Daniels RN  Medication Review/Management: medications reviewed  Intervention: Promote Injury-Free Environment  Recent Flowsheet Documentation  Taken 11/30/2022 2202 by Ana Daniels, RN  Safety Promotion/Fall Prevention:   activity supervised   assistive device/personal items within reach   safety round/check completed   room organization consistent  Taken 11/30/2022 2000 by Ana Daniels, RN  Safety Promotion/Fall Prevention:   activity supervised   safety round/check completed   room organization consistent   nonskid shoes/slippers when out of bed     Problem: Aspiration (Enteral Nutrition)  Goal: Absence of Aspiration Signs and Symptoms  Outcome: Ongoing, Progressing  Intervention: Minimize Aspiration Risk  Recent Flowsheet Documentation  Taken 11/30/2022 2000 by Ana Daniels RN  Enteral Feeding Safety: placement checked     Problem: Device-Related Complication Risk (Enteral Nutrition)  Goal: Safe, Effective Therapy Delivery  Outcome: Ongoing, Progressing  Intervention: Prevent Feeding-Related Adverse Events  Recent Flowsheet Documentation  Taken 11/30/2022 2000 by Ana Daniels, RN  Enteral Feeding Safety: placement checked     Problem: Feeding Intolerance (Enteral Nutrition)  Goal: Feeding Tolerance  Outcome: Ongoing, Progressing   Goal Outcome Evaluation:         Pt still remains drowsy, but easy to arouse. Medications given through NG. Pt comfortable in bed, will continue to monitor.

## 2022-12-01 NOTE — PLAN OF CARE
Assessment: Cheyenne Estrella presents with functional mobility impairments which indicate the need for skilled intervention. Tolerating session today without incident. Pt more alert today and able to follow commands, nods yes/no. Sat EOB to perform seated LE exs. Able to stand at BS with min assist and rwx, sidestepped to HOB with min assist. Still has dobb faraz but getting PEG next Monday. Plans on acute rehab at [place].Will continue to follow and progress as tolerated.

## 2022-12-01 NOTE — CASE MANAGEMENT/SOCIAL WORK
Continued Stay Note  DIANNE Bryant     Patient Name: Cheyenne Estrella  MRN: [id number]  Today's Date: 12/1/2022    Admit Date: 11/24/2022    Plan: SIR accepted; pending clincial course. No precert or PASRR needed.   Discharge Plan     Row Name 12/01/22 0807       Plan    Plan Comments Barrier to d/c:  Has to be off Plavix for 5 days to get G-tube placed.           Expected Discharge Date and Time     Expected Discharge Date Expected Discharge Time    Dec 6, 2022             Gia Chavez RN

## 2022-12-01 NOTE — PLAN OF CARE
Problem: Adult Inpatient Plan of Care  Goal: Plan of Care Review  Outcome: Ongoing, Progressing  Goal: Patient-Specific Goal (Individualized)  Outcome: Ongoing, Progressing  Goal: Absence of Hospital-Acquired Illness or Injury  Outcome: Ongoing, Progressing  Intervention: Prevent and Manage VTE (Venous Thromboembolism) Risk  Recent Flowsheet Documentation  Taken 12/1/2022 0800 by Irish Mathew RN  VTE Prevention/Management: bilateral  Goal: Optimal Comfort and Wellbeing  Outcome: Ongoing, Progressing  Intervention: Provide Person-Centered Care  Recent Flowsheet Documentation  Taken 12/1/2022 0800 by Irish Mathew RN  Trust Relationship/Rapport:   care explained   emotional support provided   choices provided  Goal: Readiness for Transition of Care  Outcome: Ongoing, Progressing     Problem: Adjustment to Illness (Stroke, Ischemic/Transient Ischemic Attack)  Goal: Optimal Coping  Outcome: Ongoing, Progressing  Intervention: Support Psychosocial Response to Stroke  Recent Flowsheet Documentation  Taken 12/1/2022 0800 by Irish Mathew RN  Supportive Measures: active listening utilized  Family/Support System Care:   caregiver stress acknowledged   self-care encouraged   support provided     Problem: Bowel Elimination Impaired (Stroke, Ischemic/Transient Ischemic Attack)  Goal: Effective Bowel Elimination  Outcome: Ongoing, Progressing     Problem: Cerebral Tissue Perfusion (Stroke, Ischemic/Transient Ischemic Attack)  Goal: Optimal Cerebral Tissue Perfusion  Outcome: Ongoing, Progressing  Intervention: Protect and Optimize Cerebral Perfusion  Recent Flowsheet Documentation  Taken 12/1/2022 0800 by Irish Mathew RN  Sensory Stimulation Regulation:   care clustered   quiet environment promoted  Cerebral Perfusion Promotion: blood pressure monitored     Problem: Cognitive Impairment (Stroke, Ischemic/Transient Ischemic Attack)  Goal: Optimal Cognitive Function  Outcome: Ongoing, Progressing  Intervention: Optimize Cognitive  Function  Recent Flowsheet Documentation  Taken 12/1/2022 0800 by Irish Mathew RN  Sensory Stimulation Regulation:   care clustered   quiet environment promoted  Reorientation Measures:   clock in view   reorientation provided  Environment Familiarity/Consistency: daily routine followed     Problem: Communication Impairment (Stroke, Ischemic/Transient Ischemic Attack)  Goal: Improved Communication Skills  Outcome: Ongoing, Progressing  Intervention: Optimize Communication Skills  Recent Flowsheet Documentation  Taken 12/1/2022 0800 by Irish Mathew RN  Communication Enhancement Strategies: nonverbal strategies used     Problem: Functional Ability Impaired (Stroke, Ischemic/Transient Ischemic Attack)  Goal: Optimal Functional Ability  Outcome: Ongoing, Progressing     Problem: Respiratory Compromise (Stroke, Ischemic/Transient Ischemic Attack)  Goal: Effective Oxygenation and Ventilation  Outcome: Ongoing, Progressing     Problem: Sensorimotor Impairment (Stroke, Ischemic/Transient Ischemic Attack)  Goal: Improved Sensorimotor Function  Outcome: Ongoing, Progressing  Intervention: Optimize Range of Motion, Motor Control and Function  Recent Flowsheet Documentation  Taken 12/1/2022 0800 by Irish Mathew RN  Spasticity Management: positioned with supportive device     Problem: Swallowing Impairment (Stroke, Ischemic/Transient Ischemic Attack)  Goal: Optimal Eating and Swallowing without Aspiration  Outcome: Ongoing, Progressing     Problem: Urinary Elimination Impaired (Stroke, Ischemic/Transient Ischemic Attack)  Goal: Effective Urinary Elimination  Outcome: Ongoing, Progressing     Problem: Asthma Comorbidity  Goal: Maintenance of Asthma Control  Outcome: Ongoing, Progressing     Problem: Behavioral Health Comorbidity  Goal: Maintenance of Behavioral Health Symptom Control  Outcome: Ongoing, Progressing     Problem: COPD (Chronic Obstructive Pulmonary Disease) Comorbidity  Goal: Maintenance of COPD Symptom  Control  Outcome: Ongoing, Progressing  Intervention: Maintain COPD-Symptom Control  Recent Flowsheet Documentation  Taken 12/1/2022 0800 by Irish Mathew RN  Supportive Measures: active listening utilized     Problem: Diabetes Comorbidity  Goal: Blood Glucose Level Within Targeted Range  Outcome: Ongoing, Progressing     Problem: Heart Failure Comorbidity  Goal: Maintenance of Heart Failure Symptom Control  Outcome: Ongoing, Progressing     Problem: Hypertension Comorbidity  Goal: Blood Pressure in Desired Range  Outcome: Ongoing, Progressing  Intervention: Maintain Blood Pressure Management  Recent Flowsheet Documentation  Taken 12/1/2022 0800 by Irish Mathew RN  Syncope Management: position changed slowly     Problem: Obstructive Sleep Apnea Risk or Actual Comorbidity Management  Goal: Unobstructed Breathing During Sleep  Outcome: Ongoing, Progressing     Problem: Osteoarthritis Comorbidity  Goal: Maintenance of Osteoarthritis Symptom Control  Outcome: Ongoing, Progressing     Problem: Pain Chronic (Persistent) (Comorbidity Management)  Goal: Acceptable Pain Control and Functional Ability  Outcome: Ongoing, Progressing  Intervention: Optimize Psychosocial Wellbeing  Recent Flowsheet Documentation  Taken 12/1/2022 0800 by Irish Mathew RN  Supportive Measures: active listening utilized  Family/Support System Care:   caregiver stress acknowledged   self-care encouraged   support provided     Problem: Seizure Disorder Comorbidity  Goal: Maintenance of Seizure Control  Outcome: Ongoing, Progressing     Problem: Skin Injury Risk Increased  Goal: Skin Health and Integrity  Outcome: Ongoing, Progressing     Problem: Fall Injury Risk  Goal: Absence of Fall and Fall-Related Injury  Outcome: Ongoing, Progressing     Problem: Aspiration (Enteral Nutrition)  Goal: Absence of Aspiration Signs and Symptoms  Outcome: Ongoing, Progressing     Problem: Device-Related Complication Risk (Enteral Nutrition)  Goal: Safe, Effective  Therapy Delivery  Outcome: Ongoing, Progressing     Problem: Feeding Intolerance (Enteral Nutrition)  Goal: Feeding Tolerance  Outcome: Ongoing, Progressing   Goal Outcome Evaluation:

## 2022-12-01 NOTE — THERAPY TREATMENT NOTE
Acute Care - Speech Language Pathology   Swallow Treatment Note  Manny     Patient Name: Cheyenne Estrella  : 1950  MRN: [id number]  Today's Date: 2022               Admit Date: 2022  Patient was not wearing a face mask during this therapy encounter. Therapist used appropriate personal protective equipment including mask, eye protection and gloves.  Mask used was standard procedure mask. Appropriate PPE was worn during the entire therapy session. Hand hygiene was completed before and after therapy session. Patient is not in enhanced droplet precautions.             Visit Dx:     ICD-10-CM ICD-9-CM   1. Cerebrovascular accident (CVA), unspecified mechanism (HCC)  I63.9 434.91     Patient Active Problem List   Diagnosis   • Aphasia   • Hypertensive urgency   • Obesity (BMI 30-39.9)   • Oral thrush   • Tobacco abuse   • Encephalopathy   • CVA (cerebral vascular accident) (HCC)   • Cerebrovascular accident (CVA), unspecified mechanism (HCC)   • Carotid stenosis, bilateral   • Essential hypertension     Past Medical History:   Diagnosis Date   • Depression    • Hypertension    • Tobacco abuse      Past Surgical History:   Procedure Laterality Date   • KNEE SURGERY         SLP Recommendation and Plan       SWALLOW EVALUATION (last 72 hours)           EDUCATION  The patient has been educated in the following areas:   Dysphagia (Swallowing Impairment) Oral Care/Hydration NPO rationale.        SLP GOALS     Row Name 22 1000          (LTG) Swallow Patient will tolerate safest and least restriction diet without complications from aspiration.  -CB    Time Frame (Swallow Long Term Goal) by discharge  -CB    Barriers (Swallow Long Term Goal) AMS, communication impairment  -CB    Progress/Outcomes (Swallow Long Term Goal) goal ongoing;progress slower than expected  -CB    Comment (Swallow Long Term Goal) --          (STG) Swallow 1 Patient will participate in ongoing assessment of swallow including  reevaluation clinically and/or including instrumental assessment of swallow if indicated, to further assess swallow function in anticipation of PO diet.  -CB    Time Frame (Swallow Short Term Goal 1) by discharge  -CB    Progress/Outcomes (Swallow Short Term Goal 1) progress slower than expected;goal ongoing  -CB    Comment (Swallow Short Term Goal 1) --          Activity (Labial Strengthening Goal 1, SLP) increase labial tone;increase labial sensation/afferent drive  -CB    Increase Labial Tone labial resistance exercises  -CB    Increase Labial Sensation/Afferent Drive labial resistance exercises  -CB    Holmes/Accuracy (Labial Strengthening Goal 1, SLP) with moderate cues (50-74% accuracy)  -CB    Time Frame (Labial Strengthening Goal 1, SLP) short term goal (STG)  -CB    Barriers (Labial Strengthening Goal 1, SLP) --    Progress/Outcomes (Labial Strengthening Goal 1, SLP) --    Comment (Labial Strengthening Goal 1, SLP) Patient demonstrates significant labial weakness as she is unable to execute labial protrusion/retraction upon command. Patient generally demonstrates mouth open at rest as she is a mouth breather. However, when one places a moist toothette in oral cavity patient will exhibit a labial closure. Provided thermal stimulation around labial structures 15x to indirectly stimulate musculatures. No other active movement observed.  -CB          Activity (Lingual Strengthening Goal 1, SLP) increase lingual tone/sensation/control/coordination/movement;increase buccal tension or tone;increase tongue back strength  -CB    Increase Lingual Tone/Sensation/Control/Coordination/Movement lingual movement exercises;lingual resistance exercises  -CB    Increase Buccal Tension or Tone lingual movement exercises;lingual resistance exercises  -CB    Increase Tongue Back Strength lingual resistance exercises  -CB    Holmes/Accuracy (Lingual Strengthening Goal 1, SLP) --    Time Frame (Lingual Strengthening  Goal 1, SLP) short term goal (STG)  -CB    Barriers (Lingual Strengthening Goal 1, SLP) --    Progress/Outcomes (Lingual Strengthening Goal 1, SLP) --    Comment (Lingual Strengthening Goal 1, SLP) Patient does not execute lingual elevation upon command. Provided some lingual strengthening in all directions for general strengthening of lingual musculature. Minimal or no active movements are executed despite moderate cuing. Provided oral care utilizing toothette. Patient exhibits more active movement when using a toothette than when cuing to execute certain movements. Overall, attention to task is reduced. ST will continue to follow to provide ongoing strengthening exercises and to pursue active movements of oral musculatures.  -CB    Row Name 11/28/22 1100             (LTG) Swallow    (LTG) Swallow Patient will tolerate safest and least restriction diet without complications from aspiration.  -LF      Time Frame (Swallow Long Term Goal) by discharge  -LF      Barriers (Swallow Long Term Goal) AMS  -LF      Progress/Outcomes (Swallow Long Term Goal) goal ongoing  -LF      Comment (Swallow Long Term Goal) See above re-eval  -LF         (STG) Swallow 1    (STG) Swallow 1 Patient will participate in ongoing assessment of swallow including reevaluation clinically and/or including instrumental assessment of swallow if indicated, to further assess swallow function in anticipation of PO diet.  -LF      Time Frame (Swallow Short Term Goal 1) by discharge  -LF      Progress/Outcomes (Swallow Short Term Goal 1) goal ongoing  -LF      Comment (Swallow Short Term Goal 1) See above re-eval  -LF            User Key  (r) = Recorded By, (t) = Taken By, (c) = Cosigned By    Initials Name Provider Type                Neyda Moore SLP Speech and Language Pathologist                   Time Calculation:                JOSÉ ANTONIO Walter  12/1/2022

## 2022-12-01 NOTE — PROGRESS NOTES
[facility] Medicine Services Daily Progress Note    Patient Name: Cheyenne Esrtella  : 1950  MRN: [id number]  Primary Care Physician:  Melba Rosen APRN  Date of admission: 2022      Subjective      Chief Complaint: Severe aphasia     Patient Reports right neck pain seems to be better.  Mental status somewhat better today.  Agreed to PEG and rehab.     ROS negative except as above       Objective      Vitals:   Temp:  [97.2 °F (36.2 °C)-100 °F (37.8 °C)] 99.2 °F (37.3 °C)  Heart Rate:  [83-91] 88  Resp:  [19-24] 19  BP: (149-170)/(45-66) 149/45  Flow (L/min):  [2] 2    Physical Exam  Vitals reviewed.   Constitutional:       Comments: Family at bedside  NG tube is in     HENT:      Head: Normocephalic.      Nose: Nose normal.      Mouth/Throat:      Mouth: Mucous membranes are moist.   Cardiovascular:      Rate and Rhythm: Normal rate and regular rhythm.      Pulses: Normal pulses.      Heart sounds: Normal heart sounds.   Pulmonary:      Effort: Pulmonary effort is normal.      Breath sounds: Normal breath sounds.   Abdominal:      General: Abdomen is flat.      Palpations: Abdomen is soft.   Musculoskeletal:         General: Normal range of motion.      Cervical back: Normal range of motion.   Skin:     General: Skin is warm.   Neurological:      General: No focal deficit present.      Mental Status: She is alert and oriented to person, place, and time.      Comments: Patient is aphasic   Psychiatric:         Mood and Affect: Mood normal.         Behavior: Behavior normal.        Result Review    Result Review:  I have personally reviewed the results from the time of this admission to 2022 17:15 EST and agree with these findings:  [x]  Laboratory  []  Microbiology  []  Radiology  []  EKG/Telemetry   []  Cardiology/Vascular   []  Pathology  []  Old records  []  Other:  Most notable findings include: Sodium 147 white count 13.3         Assessment & Plan       Brief  Patient Summary:  Cheyenne Estrella is a 72 y.o. female with PMHx of HTN, HLD, anxiety, CVA who presented to [facility] on 11/24/2022 complaining of aphasia.  Due to condition HPI obtained from family at bedside and available records.  LWK 1730. Patient started slurring speech on phone with family and EMS called.  Patient then became completely aphasic.  Denies chest pain, dyspnea, fevers, chills, numbness, tingling, or gait instability.  Presented to [facility] ED due to possibility of stroke     In the ED, vitals 98.3, HR 79, RR 24, /67, 91% RA.  Labs notable for troponin <0.01, glucose 110.  Stroke team called.  Pt outside window for Tpa.  CT head showed no acute abnormality but did show remote infarct in left basal ganglia extending into the left corona radiata along with small remote infarcts in the left thalamus and right basal ganglia.  CTA head/neck pending.  Patient to be admitted for neurological evaluation.       11/25/2022: Patient family at bedside, patient still unable to speak but appearing to understand words.  MRI showing possible infarct of right basal ganglia, neurology ordering another MRI for evaluation of any further evolving infarct given speech issues.  This is patient's second stroke within a year.  Patient evaluated by speech PT and OT having discussion of possible PEG tube given severity of symptoms     11/26/2022: Patient with no improvement in symptoms.  Still unable to verbalize.  Discussing feeding options with family.     aspirin, 81 mg, Per G Tube, Daily   Or  aspirin, 300 mg, Rectal, Daily  atorvastatin, 80 mg, Per G Tube, Nightly  clopidogrel, 75 mg, Per G Tube, Daily  [START ON 11/28/2022] lisinopril, 5 mg, Nasogastric, Q24H  sertraline, 50 mg, Per G Tube, Daily  sodium chloride, 10 mL, Intravenous, Q12H        dextrose 5 % and sodium chloride 0.45 %, 75 mL/hr, Last Rate: 75 mL/hr (11/26/22 7416)         Active Hospital Problems:          Active Hospital Problems      Diagnosis     • **Cerebrovascular accident (CVA), unspecified mechanism (HCC)     • Carotid stenosis, bilateral     • Essential hypertension     • Aphasia     • Tobacco abuse        Plan:   Aphasia-secondary to underlying infarct, possibility of evolving state.  Patient was established history of previous CVA within the last year was still in the process of rehabbing.  Patient's granddaughter reports patient recently lost her spouse and had great deal of emotional stress, appears stable but not improving  -Neurology evaluated concern for progression or worsening  -Repeat MRI showing new areas of acute infarct in right precentral gyrus cortex and within inferior right frontal lobe  -CTA showing 60% stenosis of right CCA 50% stenosis of left CCA but not actionable at this time  -ED evaluated not criteria for tPA due to questionable timeframe  -Antiplatelet  -Echo showing normal EF no discussion of shunting  -PT/OT/speech  -May need discussion of PEG tube, family aware and starting NG tube feeds  -B12 and thyroid function ordered  -Given underlying carotid disease vascular surgery consulted recommending medical management at this time can follow-up in clinic for further discussion  -Continue speech therapy  -Repeat CT of the head ordered for persistent lethargy.    No acute findings.  -Patient agreed to PEG tube placement and rehab on November 30.  GI consulted.  Will need to wait till Monday due to Plavix.  Patient seems less lethargic on December 1.     Leukocytosis-increase over the last 24 hours though no signs of focal infection currently  -Continue to trend leukocytosis     Hypertension/hyperlipidemia/anxiety-chronic in nature  -Allow for permissive hypertension  -Resume home medication as clinically appropriate     Persistent neck pain lidocaine patch ordered.  CT soft tissue neck negative     DVT prophylaxis:  Mechanical DVT prophylaxis orders are present.     CODE STATUS:    Code Status (Patient has no pulse  and is not breathing): CPR (Attempt to Resuscitate)  Medical Interventions (Patient has pulse or is breathing): Full Support  Release to patient: Routine Release     FMLA paperwork filled out for patient's daughter     Disposition: Given severity of stroke uncertain discharge time     This patient has been examined wearing appropriate Personal Protective Equipment and discussed with hospital infection control department.    12/01/22      Electronically signed by Martin Cabello MD, 12/01/22, 17:15 EST.  [facility] Hospitalist Team

## 2022-12-01 NOTE — PROGRESS NOTES
Nutrition Services    Patient Name: Cheyenne Estrella  YOB: 1950  MRN: [id number]  Admission date: 11/24/2022    PPE Documentation        PPE Worn By Provider Did not enter room for this encounter   PPE Worn By Patient  N/A     PROGRESS NOTE      Encounter Information: Progress note to check on TF. Isosource 1.5 is infusing at 50mL/hour via NG tube, with tolerance.        PO Diet: NPO Diet NPO Type: Sips with Meds   PO Supplements: None ordered   PO Intake:  NPO       Current nutrition support: Isosource 1.5 @ 50mL/hour + 20mL/hour water flush; with Prosource TF, 1 packet BID   Nutrition support review: Infusing as above, per documentation        Labs (reviewed below): Hypernatremia - will adjust water flush   Elevated LFTs - monitor   Hyperglycemia - possibly R/t acute stressors; monitor        GI Function:  Stool Output  Stool Unmeasured Occurrence: 1 (12/01/22 1300)  Bowel Incontinence: Yes (12/01/22 1300)  Stool Amount: smear (12/01/22 1300)          Nutrition Intervention Updates: Increase free water to 35mL/hour.       Results from last 7 days   Lab Units 12/01/22 0319 11/30/22 0257 11/29/22 0333   SODIUM mmol/L 147* 142 141   POTASSIUM mmol/L 3.8 3.9 3.7   CHLORIDE mmol/L 108* 104 102   CO2 mmol/L 29.0 28.0 29.0   BUN mg/dL 24* 21 16   CREATININE mg/dL 0.69 0.65 0.58   CALCIUM mg/dL 9.7 9.8 9.8   BILIRUBIN mg/dL 0.4 0.5 0.6   ALK PHOS U/L 159* 128* 120*   ALT (SGPT) U/L 127* 67* 30   AST (SGOT) U/L 134* 85* 35*   GLUCOSE mg/dL 169* 168* 159*     Results from last 7 days   Lab Units 12/01/22 0319 11/30/22 0257 11/29/22  0333 11/25/22  0926 11/24/22  2159   MAGNESIUM mg/dL 2.3 2.1 2.0   < >  --    PHOSPHORUS mg/dL 3.6 3.5 2.7   < >  --    HEMOGLOBIN g/dL 12.2 12.5 13.1   < > 13.5   HEMATOCRIT % 37.6 38.1 41.1   < > 41.8   TRIGLYCERIDES mg/dL  --   --   --   --  61    < > = values in this interval not displayed.     COVID19   Date Value Ref Range Status   02/18/2022 Not Detected Not  Detected - Ref. Range Final     Lab Results   Component Value Date    HGBA1C 5.4 11/25/2022     RD to follow up per protocol.    Electronically signed by:  Kellen Haile RD  12/01/22 16:12 EST

## 2022-12-01 NOTE — PROGRESS NOTES
Patient more awake and alert  Left-sided weakness improving  Speech still not there    May be okay for PEG placement    Continue present supportive care

## 2022-12-02 ENCOUNTER — APPOINTMENT (OUTPATIENT)
Dept: GENERAL RADIOLOGY | Facility: HOSPITAL | Age: 72
End: 2022-12-02

## 2022-12-02 PROBLEM — R63.30 FEEDING DIFFICULTY: Status: ACTIVE | Noted: 2022-11-24

## 2022-12-02 LAB
ALBUMIN SERPL-MCNC: 3.5 G/DL (ref 3.5–5.2)
ALBUMIN/GLOB SERPL: 1 G/DL
ALP SERPL-CCNC: 171 U/L (ref 39–117)
ALT SERPL W P-5'-P-CCNC: 139 U/L (ref 1–33)
ANA SER QL: NEGATIVE
ANION GAP SERPL CALCULATED.3IONS-SCNC: 10 MMOL/L (ref 5–15)
AST SERPL-CCNC: 111 U/L (ref 1–32)
BASOPHILS # BLD AUTO: 0.1 10*3/MM3 (ref 0–0.2)
BASOPHILS NFR BLD AUTO: 0.4 % (ref 0–1.5)
BILIRUB SERPL-MCNC: 0.4 MG/DL (ref 0–1.2)
BUN SERPL-MCNC: 26 MG/DL (ref 8–23)
BUN/CREAT SERPL: 34.7 (ref 7–25)
CALCIUM SPEC-SCNC: 9.9 MG/DL (ref 8.6–10.5)
CHLORIDE SERPL-SCNC: 107 MMOL/L (ref 98–107)
CO2 SERPL-SCNC: 30 MMOL/L (ref 22–29)
CREAT SERPL-MCNC: 0.75 MG/DL (ref 0.57–1)
DEPRECATED RDW RBC AUTO: 51.6 FL (ref 37–54)
EGFRCR SERPLBLD CKD-EPI 2021: 84.2 ML/MIN/1.73
EOSINOPHIL # BLD AUTO: 0.2 10*3/MM3 (ref 0–0.4)
EOSINOPHIL NFR BLD AUTO: 1.2 % (ref 0.3–6.2)
ERYTHROCYTE [DISTWIDTH] IN BLOOD BY AUTOMATED COUNT: 15.3 % (ref 12.3–15.4)
GLOBULIN UR ELPH-MCNC: 3.6 GM/DL
GLUCOSE SERPL-MCNC: 148 MG/DL (ref 65–99)
HCT VFR BLD AUTO: 36.7 % (ref 34–46.6)
HGB BLD-MCNC: 12.2 G/DL (ref 12–15.9)
LYMPHOCYTES # BLD AUTO: 1.3 10*3/MM3 (ref 0.7–3.1)
LYMPHOCYTES NFR BLD AUTO: 10.1 % (ref 19.6–45.3)
MAGNESIUM SERPL-MCNC: 2.3 MG/DL (ref 1.6–2.4)
MCH RBC QN AUTO: 30.1 PG (ref 26.6–33)
MCHC RBC AUTO-ENTMCNC: 33.1 G/DL (ref 31.5–35.7)
MCV RBC AUTO: 91 FL (ref 79–97)
MONOCYTES # BLD AUTO: 1 10*3/MM3 (ref 0.1–0.9)
MONOCYTES NFR BLD AUTO: 8 % (ref 5–12)
NEUTROPHILS NFR BLD AUTO: 10.4 10*3/MM3 (ref 1.7–7)
NEUTROPHILS NFR BLD AUTO: 80.3 % (ref 42.7–76)
NRBC BLD AUTO-RTO: 0 /100 WBC (ref 0–0.2)
PHOSPHATE SERPL-MCNC: 4.3 MG/DL (ref 2.5–4.5)
PLATELET # BLD AUTO: 243 10*3/MM3 (ref 140–450)
PMV BLD AUTO: 10.3 FL (ref 6–12)
POTASSIUM SERPL-SCNC: 4 MMOL/L (ref 3.5–5.2)
PROT SERPL-MCNC: 7.1 G/DL (ref 6–8.5)
RBC # BLD AUTO: 4.04 10*6/MM3 (ref 3.77–5.28)
SODIUM SERPL-SCNC: 147 MMOL/L (ref 136–145)
WBC NRBC COR # BLD: 13 10*3/MM3 (ref 3.4–10.8)

## 2022-12-02 PROCEDURE — 25010000002 ENOXAPARIN PER 10 MG: Performed by: NURSE PRACTITIONER

## 2022-12-02 PROCEDURE — 25010000002 HYDRALAZINE PER 20 MG: Performed by: INTERNAL MEDICINE

## 2022-12-02 PROCEDURE — 83735 ASSAY OF MAGNESIUM: CPT | Performed by: INTERNAL MEDICINE

## 2022-12-02 PROCEDURE — 84100 ASSAY OF PHOSPHORUS: CPT | Performed by: INTERNAL MEDICINE

## 2022-12-02 PROCEDURE — 85025 COMPLETE CBC W/AUTO DIFF WBC: CPT | Performed by: INTERNAL MEDICINE

## 2022-12-02 PROCEDURE — 80053 COMPREHEN METABOLIC PANEL: CPT | Performed by: INTERNAL MEDICINE

## 2022-12-02 PROCEDURE — 97530 THERAPEUTIC ACTIVITIES: CPT

## 2022-12-02 PROCEDURE — 74018 RADEX ABDOMEN 1 VIEW: CPT

## 2022-12-02 RX ORDER — SODIUM CHLORIDE 0.9 % (FLUSH) 0.9 %
3-10 SYRINGE (ML) INJECTION AS NEEDED
Status: CANCELLED | OUTPATIENT
Start: 2022-12-02

## 2022-12-02 RX ORDER — SODIUM CHLORIDE 0.9 % (FLUSH) 0.9 %
3 SYRINGE (ML) INJECTION EVERY 12 HOURS SCHEDULED
Status: CANCELLED | OUTPATIENT
Start: 2022-12-02

## 2022-12-02 RX ORDER — CARVEDILOL 6.25 MG/1
6.25 TABLET ORAL 2 TIMES DAILY WITH MEALS
Status: DISCONTINUED | OUTPATIENT
Start: 2022-12-02 | End: 2022-12-03

## 2022-12-02 RX ORDER — TRAMADOL HYDROCHLORIDE 50 MG/1
50 TABLET ORAL EVERY 6 HOURS PRN
Status: DISCONTINUED | OUTPATIENT
Start: 2022-12-02 | End: 2022-12-03

## 2022-12-02 RX ORDER — LISINOPRIL 20 MG/1
20 TABLET ORAL
Status: DISCONTINUED | OUTPATIENT
Start: 2022-12-02 | End: 2022-12-03

## 2022-12-02 RX ORDER — SODIUM CHLORIDE 9 MG/ML
40 INJECTION, SOLUTION INTRAVENOUS AS NEEDED
Status: DISCONTINUED | OUTPATIENT
Start: 2022-12-02 | End: 2022-12-09 | Stop reason: HOSPADM

## 2022-12-02 RX ADMIN — CARVEDILOL 6.25 MG: 6.25 TABLET, FILM COATED ORAL at 17:38

## 2022-12-02 RX ADMIN — ENOXAPARIN SODIUM 80 MG: 100 INJECTION SUBCUTANEOUS at 04:18

## 2022-12-02 RX ADMIN — ATORVASTATIN CALCIUM 80 MG: 40 TABLET, FILM COATED ORAL at 23:09

## 2022-12-02 RX ADMIN — CARVEDILOL 6.25 MG: 6.25 TABLET, FILM COATED ORAL at 08:55

## 2022-12-02 RX ADMIN — HYDRALAZINE HYDROCHLORIDE 10 MG: 20 INJECTION INTRAMUSCULAR; INTRAVENOUS at 03:40

## 2022-12-02 RX ADMIN — SERTRALINE 50 MG: 50 TABLET, FILM COATED ORAL at 08:55

## 2022-12-02 RX ADMIN — ENOXAPARIN SODIUM 80 MG: 100 INJECTION SUBCUTANEOUS at 17:38

## 2022-12-02 RX ADMIN — TRAMADOL HYDROCHLORIDE 50 MG: 50 TABLET, COATED ORAL at 23:09

## 2022-12-02 RX ADMIN — ASPIRIN 81 MG CHEWABLE TABLET 81 MG: 81 TABLET CHEWABLE at 08:55

## 2022-12-02 RX ADMIN — LISINOPRIL 20 MG: 20 TABLET ORAL at 08:55

## 2022-12-02 RX ADMIN — LIDOCAINE 1 PATCH: 50 PATCH CUTANEOUS at 08:56

## 2022-12-02 NOTE — THERAPY TREATMENT NOTE
Subjective: Pt agreeable to therapeutic plan of care. Pt did not verbalize communication throughout tx session.    Objective:     Bed mobility - CGA and Min-A  Transfers - Min-A and with rolling walker  Ambulation - 2 feet Min-A and with rolling walker    Vitals: WNL    Pain: Pt did not verbalize VAS   Location:N/A  Intervention for pain: N/A    Education: Provided education on the importance of mobility in the acute care setting, Verbal/Tactile Cues, Transfer Training, Gait Training and Energy conservation strategies    Assessment: Cheyenne Estrella presents with functional mobility impairments which indicate the need for skilled intervention. Pt required CGA-min A during log roll technique on this date. Pt required min A/RW with verbal and tactile cuing to safely ambulate ~2' to bedside chair. Pt displayed fatigued requiring cuing to keep eyes open during tx session. Tolerating session today without incident. Will continue to follow and progress as tolerated.     Plan/Recommendations:   High Intensity Therapy recommended post-acute care. This is recommended as therapy feels the patient would require 5-6 days per week, 2-3 hours per day. At this time, inpatient rehabilitation (acute rehab) would be the first choice and SNF would be second.. Pt requires no DME at discharge.     Pt desires Inpatient Rehabilitation placement at discharge. Pt cooperative; agreeable to therapeutic recommendations and plan of care.         Basic Mobility 6-click:  Rollin = Total, A lot = 2, A little = 3; 4 = None  Supine>Sit:   1 = Total, A lot = 2, A little = 3; 4 = None   Sit>Stand with arms:  1 = Total, A lot = 2, A little = 3; 4 = None  Bed>Chair:   1 = Total, A lot = 2, A little = 3; 4 = None  Ambulate in room:  1 = Total, A lot = 2, A little = 3; 4 = None  3-5 Steps with railin = Total, A lot = 2, A little = 3; 4 = None  Score: 14    Modified [place]: N/A = No pre-op stroke/TIA    Post-Tx Position: Up in Chair,  Alarms activated and Call light and personal items within reach  PPE: gloves and surgical mask

## 2022-12-02 NOTE — PLAN OF CARE
Goal Outcome Evaluation:      Cheyenne Estrella presents with functional mobility impairments which indicate the need for skilled intervention. Pt required CGA-min A during log roll technique on this date. Pt required min A/RW with verbal and tactile cuing to safely ambulate ~2' to bedside chair. Pt displayed fatigued requiring cuing to keep eyes open during tx session. Tolerating session today without incident. Will continue to follow and progress as tolerated.

## 2022-12-02 NOTE — PROGRESS NOTES
Nutrition Services    Patient Name: Cheyenne Estrella  YOB: 1950  MRN: [id number]  Admission date: 11/24/2022    PPE Documentation        PPE Worn By Provider Did not enter room for this encounter   PPE Worn By Patient  N/A     PROGRESS NOTE      Encounter Information: Progress note to check on TF. Isosource 1.5 is infusing at 50mL/hour via NG tube, with minimal residual volumes, per documentation.        PO Diet: NPO Diet NPO Type: Sips with Meds   PO Supplements: None ordered   PO Intake:  NPO       Current nutrition support: Isosource 1.5 @ 50mL/hour + 35mL/hour water flush; with Prosource TF, 1 packet BID   Nutrition support review: Infusing as above, per documentation        Labs (reviewed below): Hypernatremia - will increase water flush further  Elevated LFTs - ongoing  Hyperglycemia - possibly R/t acute stressors; monitor        GI Function:  Stool Output  Stool Unmeasured Occurrence: 1 (12/01/22 1300)  Bowel Incontinence: Yes (12/01/22 1300)  Stool Amount: smear (12/01/22 1300)          Nutrition Intervention Updates: Increase free water to 50mL/hour.       Results from last 7 days   Lab Units 12/02/22  0331 12/01/22 0319 11/30/22  0257   SODIUM mmol/L 147* 147* 142   POTASSIUM mmol/L 4.0 3.8 3.9   CHLORIDE mmol/L 107 108* 104   CO2 mmol/L 30.0* 29.0 28.0   BUN mg/dL 26* 24* 21   CREATININE mg/dL 0.75 0.69 0.65   CALCIUM mg/dL 9.9 9.7 9.8   BILIRUBIN mg/dL 0.4 0.4 0.5   ALK PHOS U/L 171* 159* 128*   ALT (SGPT) U/L 139* 127* 67*   AST (SGOT) U/L 111* 134* 85*   GLUCOSE mg/dL 148* 169* 168*     Results from last 7 days   Lab Units 12/02/22  0331 12/01/22 0319 11/30/22  0257   MAGNESIUM mg/dL 2.3 2.3 2.1   PHOSPHORUS mg/dL 4.3 3.6 3.5   HEMOGLOBIN g/dL 12.2 12.2 12.5   HEMATOCRIT % 36.7 37.6 38.1     COVID19   Date Value Ref Range Status   02/18/2022 Not Detected Not Detected - Ref. Range Final     Lab Results   Component Value Date    HGBA1C 5.4 11/25/2022     RD to follow up per  protocol.    Electronically signed by:  Kellen Haile RD  12/02/22 16:05 EST

## 2022-12-03 ENCOUNTER — APPOINTMENT (OUTPATIENT)
Dept: GENERAL RADIOLOGY | Facility: HOSPITAL | Age: 72
End: 2022-12-03

## 2022-12-03 ENCOUNTER — APPOINTMENT (OUTPATIENT)
Dept: CT IMAGING | Facility: HOSPITAL | Age: 72
End: 2022-12-03

## 2022-12-03 LAB
ABO GROUP BLD: NORMAL
ALBUMIN SERPL-MCNC: 3.3 G/DL (ref 3.5–5.2)
ALBUMIN/GLOB SERPL: 1 G/DL
ALP SERPL-CCNC: 135 U/L (ref 39–117)
ALT SERPL W P-5'-P-CCNC: 92 U/L (ref 1–33)
ANION GAP SERPL CALCULATED.3IONS-SCNC: 13 MMOL/L (ref 5–15)
APTT PPP: 33.1 SECONDS (ref 24–31)
ARTERIAL PATENCY WRIST A: POSITIVE
AST SERPL-CCNC: 41 U/L (ref 1–32)
ATMOSPHERIC PRESS: ABNORMAL MM[HG]
BASE EXCESS BLDA CALC-SCNC: 1 MMOL/L (ref 0–3)
BDY SITE: ABNORMAL
BILIRUB SERPL-MCNC: 0.5 MG/DL (ref 0–1.2)
BLD GP AB SCN SERPL QL: NEGATIVE
BUN SERPL-MCNC: 48 MG/DL (ref 8–23)
BUN/CREAT SERPL: 27.7 (ref 7–25)
CALCIUM SPEC-SCNC: 9.3 MG/DL (ref 8.6–10.5)
CHLORIDE SERPL-SCNC: 108 MMOL/L (ref 98–107)
CO2 BLDA-SCNC: 26.2 MMOL/L (ref 22–29)
CO2 SERPL-SCNC: 28 MMOL/L (ref 22–29)
CREAT SERPL-MCNC: 1.73 MG/DL (ref 0.57–1)
D-LACTATE SERPL-SCNC: 1.8 MMOL/L (ref 0.5–2)
D-LACTATE SERPL-SCNC: 2.2 MMOL/L (ref 0.5–2)
DEPRECATED RDW RBC AUTO: 47.7 FL (ref 37–54)
EGFRCR SERPLBLD CKD-EPI 2021: 30.9 ML/MIN/1.73
ERYTHROCYTE [DISTWIDTH] IN BLOOD BY AUTOMATED COUNT: 14.5 % (ref 12.3–15.4)
GLOBULIN UR ELPH-MCNC: 3.2 GM/DL
GLUCOSE BLDC GLUCOMTR-MCNC: 151 MG/DL (ref 70–105)
GLUCOSE BLDC GLUCOMTR-MCNC: 154 MG/DL (ref 70–105)
GLUCOSE BLDC GLUCOMTR-MCNC: 159 MG/DL (ref 70–105)
GLUCOSE SERPL-MCNC: 224 MG/DL (ref 65–99)
HCO3 BLDA-SCNC: 25 MMOL/L (ref 21–28)
HCT VFR BLD AUTO: 25.1 % (ref 34–46.6)
HCT VFR BLD AUTO: 26.9 % (ref 34–46.6)
HCT VFR BLD AUTO: 27 % (ref 34–46.6)
HCT VFR BLD AUTO: 29.8 % (ref 34–46.6)
HEMODILUTION: NO
HGB BLD-MCNC: 8 G/DL (ref 12–15.9)
HGB BLD-MCNC: 8.4 G/DL (ref 12–15.9)
HGB BLD-MCNC: 8.4 G/DL (ref 12–15.9)
HGB BLD-MCNC: 9.3 G/DL (ref 12–15.9)
INHALED O2 CONCENTRATION: 44 %
INR PPP: 1.05 (ref 0.93–1.1)
LARGE PLATELETS: ABNORMAL
LYMPHOCYTES # BLD MANUAL: 1.1 10*3/MM3 (ref 0.7–3.1)
LYMPHOCYTES NFR BLD MANUAL: 4 % (ref 5–12)
MAGNESIUM SERPL-MCNC: 2.3 MG/DL (ref 1.6–2.4)
MCH RBC QN AUTO: 29 PG (ref 26.6–33)
MCHC RBC AUTO-ENTMCNC: 31.1 G/DL (ref 31.5–35.7)
MCV RBC AUTO: 93.2 FL (ref 79–97)
METAMYELOCYTES NFR BLD MANUAL: 2 % (ref 0–0)
MODALITY: ABNORMAL
MONOCYTES # BLD: 1.1 10*3/MM3 (ref 0.1–0.9)
MRSA DNA SPEC QL NAA+PROBE: NORMAL
NEUTROPHILS # BLD AUTO: 24.84 10*3/MM3 (ref 1.7–7)
NEUTROPHILS NFR BLD MANUAL: 81 % (ref 42.7–76)
NEUTS BAND NFR BLD MANUAL: 9 % (ref 0–5)
NT-PROBNP SERPL-MCNC: 2191 PG/ML (ref 0–900)
PCO2 BLDA: 36.6 MM HG (ref 35–48)
PH BLDA: 7.44 PH UNITS (ref 7.35–7.45)
PHOSPHATE SERPL-MCNC: 5.3 MG/DL (ref 2.5–4.5)
PLATELET # BLD AUTO: 360 10*3/MM3 (ref 140–450)
PMV BLD AUTO: 10.5 FL (ref 6–12)
PO2 BLDA: 63.9 MM HG (ref 83–108)
POTASSIUM SERPL-SCNC: 4.2 MMOL/L (ref 3.5–5.2)
PROT SERPL-MCNC: 6.5 G/DL (ref 6–8.5)
PROTHROMBIN TIME: 10.8 SECONDS (ref 9.6–11.7)
RBC # BLD AUTO: 3.19 10*6/MM3 (ref 3.77–5.28)
RBC MORPH BLD: NORMAL
RH BLD: POSITIVE
SAO2 % BLDCOA: 93 % (ref 94–98)
SCAN SLIDE: NORMAL
SODIUM SERPL-SCNC: 149 MMOL/L (ref 136–145)
T&S EXPIRATION DATE: NORMAL
VANCOMYCIN SERPL-MCNC: 21.9 MCG/ML (ref 5–40)
VARIANT LYMPHS NFR BLD MANUAL: 4 % (ref 19.6–45.3)
WBC MORPH BLD: NORMAL
WBC NRBC COR # BLD: 27.6 10*3/MM3 (ref 3.4–10.8)

## 2022-12-03 PROCEDURE — 87040 BLOOD CULTURE FOR BACTERIA: CPT | Performed by: NURSE PRACTITIONER

## 2022-12-03 PROCEDURE — 25010000002 MORPHINE PER 10 MG: Performed by: INTERNAL MEDICINE

## 2022-12-03 PROCEDURE — 0 IOPAMIDOL PER 1 ML: Performed by: INTERNAL MEDICINE

## 2022-12-03 PROCEDURE — 85007 BL SMEAR W/DIFF WBC COUNT: CPT | Performed by: INTERNAL MEDICINE

## 2022-12-03 PROCEDURE — 83735 ASSAY OF MAGNESIUM: CPT | Performed by: INTERNAL MEDICINE

## 2022-12-03 PROCEDURE — 85025 COMPLETE CBC W/AUTO DIFF WBC: CPT | Performed by: INTERNAL MEDICINE

## 2022-12-03 PROCEDURE — 71045 X-RAY EXAM CHEST 1 VIEW: CPT

## 2022-12-03 PROCEDURE — 86923 COMPATIBILITY TEST ELECTRIC: CPT

## 2022-12-03 PROCEDURE — 25010000002 CEFTRIAXONE PER 250 MG: Performed by: NURSE PRACTITIONER

## 2022-12-03 PROCEDURE — 85730 THROMBOPLASTIN TIME PARTIAL: CPT | Performed by: NURSE PRACTITIONER

## 2022-12-03 PROCEDURE — 86850 RBC ANTIBODY SCREEN: CPT | Performed by: INTERNAL MEDICINE

## 2022-12-03 PROCEDURE — 87641 MR-STAPH DNA AMP PROBE: CPT | Performed by: NURSE PRACTITIONER

## 2022-12-03 PROCEDURE — 36600 WITHDRAWAL OF ARTERIAL BLOOD: CPT

## 2022-12-03 PROCEDURE — 83605 ASSAY OF LACTIC ACID: CPT | Performed by: NURSE PRACTITIONER

## 2022-12-03 PROCEDURE — 85652 RBC SED RATE AUTOMATED: CPT | Performed by: INTERNAL MEDICINE

## 2022-12-03 PROCEDURE — 25010000002 HYDROMORPHONE 1 MG/ML SOLUTION: Performed by: NURSE PRACTITIONER

## 2022-12-03 PROCEDURE — 82803 BLOOD GASES ANY COMBINATION: CPT

## 2022-12-03 PROCEDURE — 85018 HEMOGLOBIN: CPT | Performed by: INTERNAL MEDICINE

## 2022-12-03 PROCEDURE — 94640 AIRWAY INHALATION TREATMENT: CPT

## 2022-12-03 PROCEDURE — 85610 PROTHROMBIN TIME: CPT | Performed by: NURSE PRACTITIONER

## 2022-12-03 PROCEDURE — 25010000002 VANCOMYCIN 750 MG RECONSTITUTED SOLUTION 1 EACH VIAL: Performed by: INTERNAL MEDICINE

## 2022-12-03 PROCEDURE — 80202 ASSAY OF VANCOMYCIN: CPT | Performed by: INTERNAL MEDICINE

## 2022-12-03 PROCEDURE — 94799 UNLISTED PULMONARY SVC/PX: CPT

## 2022-12-03 PROCEDURE — 25010000002 CEFEPIME PER 500 MG: Performed by: INTERNAL MEDICINE

## 2022-12-03 PROCEDURE — 74177 CT ABD & PELVIS W/CONTRAST: CPT

## 2022-12-03 PROCEDURE — 74174 CTA ABD&PLVS W/CONTRAST: CPT

## 2022-12-03 PROCEDURE — 85018 HEMOGLOBIN: CPT | Performed by: STUDENT IN AN ORGANIZED HEALTH CARE EDUCATION/TRAINING PROGRAM

## 2022-12-03 PROCEDURE — 85014 HEMATOCRIT: CPT | Performed by: INTERNAL MEDICINE

## 2022-12-03 PROCEDURE — 99222 1ST HOSP IP/OBS MODERATE 55: CPT | Performed by: STUDENT IN AN ORGANIZED HEALTH CARE EDUCATION/TRAINING PROGRAM

## 2022-12-03 PROCEDURE — 80053 COMPREHEN METABOLIC PANEL: CPT | Performed by: INTERNAL MEDICINE

## 2022-12-03 PROCEDURE — 86900 BLOOD TYPING SEROLOGIC ABO: CPT | Performed by: INTERNAL MEDICINE

## 2022-12-03 PROCEDURE — 84100 ASSAY OF PHOSPHORUS: CPT | Performed by: INTERNAL MEDICINE

## 2022-12-03 PROCEDURE — 74176 CT ABD & PELVIS W/O CONTRAST: CPT

## 2022-12-03 PROCEDURE — 83880 ASSAY OF NATRIURETIC PEPTIDE: CPT | Performed by: INTERNAL MEDICINE

## 2022-12-03 PROCEDURE — 25010000002 VANCOMYCIN HCL 1.25 G RECONSTITUTED SOLUTION 1 EACH VIAL: Performed by: NURSE PRACTITIONER

## 2022-12-03 PROCEDURE — 82962 GLUCOSE BLOOD TEST: CPT

## 2022-12-03 PROCEDURE — 85014 HEMATOCRIT: CPT | Performed by: STUDENT IN AN ORGANIZED HEALTH CARE EDUCATION/TRAINING PROGRAM

## 2022-12-03 PROCEDURE — 86901 BLOOD TYPING SEROLOGIC RH(D): CPT | Performed by: INTERNAL MEDICINE

## 2022-12-03 RX ORDER — DEXTROSE MONOHYDRATE 25 G/50ML
25 INJECTION, SOLUTION INTRAVENOUS
Status: DISCONTINUED | OUTPATIENT
Start: 2022-12-03 | End: 2022-12-09 | Stop reason: HOSPADM

## 2022-12-03 RX ORDER — MORPHINE SULFATE 2 MG/ML
1 INJECTION, SOLUTION INTRAMUSCULAR; INTRAVENOUS
Status: DISCONTINUED | OUTPATIENT
Start: 2022-12-03 | End: 2022-12-09 | Stop reason: HOSPADM

## 2022-12-03 RX ORDER — SODIUM CHLORIDE 450 MG/100ML
100 INJECTION, SOLUTION INTRAVENOUS CONTINUOUS
Status: DISCONTINUED | OUTPATIENT
Start: 2022-12-03 | End: 2022-12-07

## 2022-12-03 RX ORDER — NICOTINE POLACRILEX 4 MG
15 LOZENGE BUCCAL
Status: DISCONTINUED | OUTPATIENT
Start: 2022-12-03 | End: 2022-12-09 | Stop reason: HOSPADM

## 2022-12-03 RX ORDER — MIDODRINE HYDROCHLORIDE 5 MG/1
5 TABLET ORAL
Status: DISCONTINUED | OUTPATIENT
Start: 2022-12-03 | End: 2022-12-03

## 2022-12-03 RX ORDER — TRAMADOL HYDROCHLORIDE 50 MG/1
50 TABLET ORAL EVERY 6 HOURS PRN
Status: ACTIVE | OUTPATIENT
Start: 2022-12-03 | End: 2022-12-04

## 2022-12-03 RX ORDER — SODIUM CHLORIDE 9 MG/ML
50 INJECTION, SOLUTION INTRAVENOUS CONTINUOUS
Status: DISCONTINUED | OUTPATIENT
Start: 2022-12-03 | End: 2022-12-03

## 2022-12-03 RX ORDER — OLANZAPINE 10 MG/2ML
1 INJECTION, POWDER, LYOPHILIZED, FOR SOLUTION INTRAMUSCULAR
Status: DISCONTINUED | OUTPATIENT
Start: 2022-12-03 | End: 2022-12-09 | Stop reason: HOSPADM

## 2022-12-03 RX ORDER — INSULIN LISPRO 100 [IU]/ML
2-7 INJECTION, SOLUTION INTRAVENOUS; SUBCUTANEOUS EVERY 6 HOURS SCHEDULED
Status: DISCONTINUED | OUTPATIENT
Start: 2022-12-03 | End: 2022-12-09 | Stop reason: HOSPADM

## 2022-12-03 RX ORDER — MIDODRINE HYDROCHLORIDE 5 MG/1
5 TABLET ORAL
Status: DISCONTINUED | OUTPATIENT
Start: 2022-12-03 | End: 2022-12-04

## 2022-12-03 RX ORDER — IPRATROPIUM BROMIDE AND ALBUTEROL SULFATE 2.5; .5 MG/3ML; MG/3ML
3 SOLUTION RESPIRATORY (INHALATION)
Status: DISCONTINUED | OUTPATIENT
Start: 2022-12-03 | End: 2022-12-07

## 2022-12-03 RX ADMIN — Medication 10 ML: at 21:41

## 2022-12-03 RX ADMIN — SODIUM CHLORIDE 50 ML/HR: 9 INJECTION, SOLUTION INTRAVENOUS at 04:01

## 2022-12-03 RX ADMIN — CEFEPIME 2 G: 2 INJECTION, POWDER, FOR SOLUTION INTRAVENOUS at 20:25

## 2022-12-03 RX ADMIN — MORPHINE SULFATE 1 MG: 2 INJECTION, SOLUTION INTRAMUSCULAR; INTRAVENOUS at 23:30

## 2022-12-03 RX ADMIN — HYDROMORPHONE HYDROCHLORIDE 0.5 MG: 1 INJECTION, SOLUTION INTRAMUSCULAR; INTRAVENOUS; SUBCUTANEOUS at 05:37

## 2022-12-03 RX ADMIN — VANCOMYCIN HYDROCHLORIDE 1250 MG: 1.25 INJECTION, POWDER, LYOPHILIZED, FOR SOLUTION INTRAVENOUS at 05:36

## 2022-12-03 RX ADMIN — SODIUM CHLORIDE, POTASSIUM CHLORIDE, SODIUM LACTATE AND CALCIUM CHLORIDE 500 ML: 600; 310; 30; 20 INJECTION, SOLUTION INTRAVENOUS at 09:39

## 2022-12-03 RX ADMIN — VANCOMYCIN HYDROCHLORIDE 750 MG: 750 INJECTION, POWDER, LYOPHILIZED, FOR SOLUTION INTRAVENOUS at 17:15

## 2022-12-03 RX ADMIN — IOPAMIDOL 100 ML: 755 INJECTION, SOLUTION INTRAVENOUS at 05:27

## 2022-12-03 RX ADMIN — CEFTRIAXONE 2 G: 2 INJECTION, POWDER, FOR SOLUTION INTRAMUSCULAR; INTRAVENOUS at 03:40

## 2022-12-03 RX ADMIN — LIDOCAINE 1 PATCH: 50 PATCH CUTANEOUS at 09:37

## 2022-12-03 RX ADMIN — MORPHINE SULFATE 1 MG: 2 INJECTION, SOLUTION INTRAMUSCULAR; INTRAVENOUS at 09:38

## 2022-12-03 RX ADMIN — SODIUM CHLORIDE 250 ML: 9 INJECTION, SOLUTION INTRAVENOUS at 03:40

## 2022-12-03 RX ADMIN — IPRATROPIUM BROMIDE AND ALBUTEROL SULFATE 3 ML: 2.5; .5 SOLUTION RESPIRATORY (INHALATION) at 20:08

## 2022-12-03 RX ADMIN — SODIUM CHLORIDE 150 ML/HR: 4.5 INJECTION, SOLUTION INTRAVENOUS at 11:17

## 2022-12-03 NOTE — PROGRESS NOTES
[facility] Medicine Services Daily Progress Note    Patient Name: Cheyenne Estrella  : 1950  MRN: [id number]  Primary Care Physician:  Melba Rosen APRN  Date of admission: 2022      Subjective      Chief Complaint: Severe aphasia     Patient somewhat lethargic and tachypneic today.  Intermittently low blood pressure.  Developed rectal sheath hematoma overnight.  Blood thinners held.  1 unit of blood ordered.  General surgery consulted for input.  Hoping for spontaneous resolution.  Consult pulmonary for hypoxemia.  Chest x-ray ordered.  May need ICU eval if gets any worse.     ROS negative except as above       Objective      Vitals:   Temp:  [96.5 °F (35.8 °C)-98.5 °F (36.9 °C)] 96.8 °F (36 °C)  Heart Rate:  [] 90  Resp:  [14-31] 31  BP: ()/(34-68) 122/44  Flow (L/min):  [2-7] 7    Physical Exam  Vitals reviewed.   Constitutional:       Comments: Family at bedside  NG tube is in.  Patient appears tired and more pale today due to bleed.     HENT:      Head: Normocephalic.      Nose: Nose normal.      Mouth/Throat:      Mouth: Mucous membranes are moist.   Cardiovascular:      Rate and Rhythm: Normal rate and regular rhythm.      Pulses: Normal pulses.      Heart sounds: Normal heart sounds.   Pulmonary:      Effort: Pulmonary effort is normal.      Breath sounds: Normal breath sounds.   Abdominal:      General: Abdomen is flat.      Palpations: Abdomen is soft.   Musculoskeletal:         General: Normal range of motion.      Cervical back: Normal range of motion.   Skin:     General: Skin is warm.   Neurological:      General: No focal deficit present.      Mental Status: She is alert and oriented to person, place, and time.      Comments: Patient is aphasic   Psychiatric:         Mood and Affect: Mood normal.         Behavior: Behavior normal.        Result Review    Result Review:  I have personally reviewed the results from the time of this admission to  12/3/2022 16:49 EST and agree with these findings:  [x]  Laboratory  []  Microbiology  []  Radiology  []  EKG/Telemetry   []  Cardiology/Vascular   []  Pathology  []  Old records  []  Other:  Most notable findings include: Hemoglobin from 12 down to 8 range.            Assessment & Plan       Brief Patient Summary:  Cheyenne Estrella is a 72 y.o. female with PMHx of HTN, HLD, anxiety, CVA who presented to [facility] on 11/24/2022 complaining of aphasia.  Due to condition HPI obtained from family at bedside and available records.  LWK 1730. Patient started slurring speech on phone with family and EMS called.  Patient then became completely aphasic.  Denies chest pain, dyspnea, fevers, chills, numbness, tingling, or gait instability.  Presented to [facility] ED due to possibility of stroke     In the ED, vitals 98.3, HR 79, RR 24, /67, 91% RA.  Labs notable for troponin <0.01, glucose 110.  Stroke team called.  Pt outside window for Tpa.  CT head showed no acute abnormality but did show remote infarct in left basal ganglia extending into the left corona radiata along with small remote infarcts in the left thalamus and right basal ganglia.  CTA head/neck pending.  Patient to be admitted for neurological evaluation.       11/25/2022: Patient family at bedside, patient still unable to speak but appearing to understand words.  MRI showing possible infarct of right basal ganglia, neurology ordering another MRI for evaluation of any further evolving infarct given speech issues.  This is patient's second stroke within a year.  Patient evaluated by speech PT and OT having discussion of possible PEG tube given severity of symptoms     11/26/2022: Patient with no improvement in symptoms.  Still unable to verbalize.  Discussing feeding options with family.     aspirin, 81 mg, Per G Tube, Daily   Or  aspirin, 300 mg, Rectal, Daily  atorvastatin, 80 mg, Per G Tube, Nightly  clopidogrel, 75 mg, Per G Tube,  Daily  [START ON 11/28/2022] lisinopril, 5 mg, Nasogastric, Q24H  sertraline, 50 mg, Per G Tube, Daily  sodium chloride, 10 mL, Intravenous, Q12H        dextrose 5 % and sodium chloride 0.45 %, 75 mL/hr, Last Rate: 75 mL/hr (11/26/22 1447)         Active Hospital Problems:          Active Hospital Problems     Diagnosis     • **Cerebrovascular accident (CVA), unspecified mechanism (HCC)     • Carotid stenosis, bilateral     • Essential hypertension     • Aphasia     • Tobacco abuse        Plan:   Rectal sheath hematoma  Monitor H&H closely  Transfuse if less than 8      Aphasia-secondary to underlying infarct, possibility of evolving state.  Patient was established history of previous CVA within the last year was still in the process of rehabbing.  Patient's granddaughter reports patient recently lost her spouse and had great deal of emotional stress, appears stable but not improving  -Neurology evaluated concern for progression or worsening  -Repeat MRI showing new areas of acute infarct in right precentral gyrus cortex and within inferior right frontal lobe  -CTA showing 60% stenosis of right CCA 50% stenosis of left CCA but not actionable at this time  -ED evaluated not criteria for tPA due to questionable timeframe  -Antiplatelet  -Echo showing normal EF no discussion of shunting  -PT/OT/speech  -May need discussion of PEG tube, family aware and starting NG tube feeds  -B12 and thyroid function ordered  -Given underlying carotid disease vascular surgery consulted recommending medical management at this time can follow-up in clinic for further discussion  -Continue speech therapy  -Repeat CT of the head ordered for persistent lethargy.    No acute findings.  -Patient agreed to PEG tube placement and rehab on November 30.  GI consulted.  Will need to wait till Monday due to Plavix.  Patient seems less lethargic on December 1.  Sitting in the chair on December 2     Leukocytosis-increase over the last 24 hours  though no signs of focal infection currently  -Continue to trend leukocytosis     Hypertension/hyperlipidemia/anxiety-chronic in nature  -Allow for permissive hypertension  -Resume home medication as clinically appropriate     Acute renal failure  Likely due to hypotension due to hemorrhage  Nephrology consulted    Acute respiratory failure  ABG shows hypoxemia with PO2 in the 60s  Pulmonary consulted  Chest x-ray ordered  May need some Lasix post blood    Persistent neck pain lidocaine patch ordered.  CT soft tissue neck negative     DVT prophylaxis:  Mechanical DVT prophylaxis orders are present.     CODE STATUS:    Code Status (Patient has no pulse and is not breathing): CPR (Attempt to Resuscitate)  Medical Interventions (Patient has pulse or is breathing): Full Support  Release to patient: Routine Release     FMLA paperwork filled out for patient's daughter     Disposition: Given severity of stroke uncertain discharge time     This patient has been examined wearing appropriate Personal Protective Equipment and discussed with hospital infection control department.     12/03/22      Electronically signed by Martin Cabello MD, 12/03/22, 16:49 EST.  [facility]ist Team

## 2022-12-03 NOTE — NURSING NOTE
Spoke to Dr. Rivas regarding abd Ct. He stated it is probably a spontaneous bleed and ordered an abd CT with contrast. Instructed to call back if CT reads active bleed. He also stated they can be very painful and to continue pain medication.

## 2022-12-03 NOTE — NURSING NOTE
Pt found to have pulled NG tube out at shift change. Replaced tube and ordered KUB for verification. Pt had pulled NG tube out again prior to XRAY. Replaced again, sitter at bedside for pt safety d/t pulling tubes

## 2022-12-03 NOTE — CONSULTS
General Surgery Consult Note      Name: Cheyenne Estrella ADMIT: 2022   : 1950  PCP: Melba Rosen APRN    MRN: [id number] LOS: 9 days   AGE/SEX: 72 y.o. female  ROOM: [address] [place] [address]      Patient Care Team:  Melba Rosen APRN as PCP - General (Nurse Practitioner)  Chief Complaint   Patient presents with   • Speech Problem       Subjective   72-year-old lady with history of multiple strokes, status post stroke last month is nonverbal.  No major abdominal surgical history, no known major vascular surgery history, has not had any recent groin lines or femoral arterial access.  Had been on Plavix since her previous stroke, was on therapeutic Lovenox here and developed a spontaneous retrorectus bleed last night.  CT abdomen pelvis with large retrorectus and extraperitoneal pelvic hematoma, CT repeated with contrast and small blush on the arterial phase but hematoma was not any larger, no obvious bleed from the epigastric or iliac vessels.  Hemoglobin has been trended initially dropped quite significantly but then has been relatively stable.  She was tachycardic last night this is resolved, intermittent hypotension but no evidence of shock.  She is getting 1 unit of blood today.    Past Medical History:   Diagnosis Date   • Depression    • Hypertension    • Tobacco abuse      Past Surgical History:   Procedure Laterality Date   • KNEE SURGERY       Family History   Problem Relation Age of Onset   • Hypertension Mother    • Stroke Mother    • Aneurysm Mother    • Breast cancer Mother    • Anuerysm Mother    • Cancer Mother    • No Known Problems Father    • No Known Problems Sister    • No Known Problems Brother    • No Known Problems Daughter    • No Known Problems Maternal Aunt    • No Known Problems Maternal Uncle    • No Known Problems Paternal Aunt    • No Known Problems Paternal Uncle    • Hypertension Maternal Grandmother    • No Known Problems Maternal Grandfather    • No Known Problems  Paternal Grandmother    • No Known Problems Paternal Grandfather      Social History     Tobacco Use   • Smoking status: Every Day     Packs/day: 1.00     Years: 54.00     Pack years: 54.00     Types: Cigarettes     Start date:      Last attempt to quit:      Years since quittin.9   • Smokeless tobacco: Never   Vaping Use   • Vaping Use: Never used   Substance Use Topics   • Alcohol use: Not Currently   • Drug use: Not Currently     Medications Prior to Admission   Medication Sig Dispense Refill Last Dose   • amLODIPine (NORVASC) 10 MG tablet Take 1 tablet by mouth Daily. 90 tablet 1 Unknown   • aspirin 325 MG tablet Take 1 tablet by mouth Daily.   Unknown   • atorvastatin (LIPITOR) 80 MG tablet Take 1 tablet by mouth Every Night. 90 tablet 1 Unknown   • lisinopril (PRINIVIL,ZESTRIL) 20 MG tablet Take 1 tablet by mouth Daily. 90 tablet 1 Unknown   • sertraline (ZOLOFT) 50 MG tablet Take 1 tablet by mouth Daily. 90 tablet 1 Unknown     atorvastatin, 80 mg, Per G Tube, Nightly  cefTRIAXone, 2 g, Intravenous, Q24H  insulin lispro, 2-7 Units, Subcutaneous, Q6H  lidocaine, 1 patch, Transdermal, Q24H  midodrine, 5 mg, Nasogastric, TID AC  sertraline, 50 mg, Per G Tube, Daily  sodium chloride, 10 mL, Intravenous, Q12H  vancomycin, 750 mg, Intravenous, Once      Pharmacy to dose vancomycin,   sodium chloride, 150 mL/hr, Last Rate: 150 mL/hr (22 1117)      •  bisacodyl  •  dextrose  •  dextrose  •  glucagon (human recombinant)  •  hydrALAZINE  •  labetalol  •  Morphine  •  ondansetron  •  Pharmacy to dose vancomycin  •  sodium chloride  •  sodium chloride  •  sodium chloride  •  sodium chloride  •  traMADol  •  Vancomycin Pharmacy Intermittent/Pulse Dosing  Patient has no known allergies.    Review of Systems   Unable to perform ROS: Mental status change        Objective     Vital Signs and Labs:  Vital Signs Patient Vitals for the past 24 hrs:   BP Temp Temp src Pulse Resp SpO2 Weight   22 1431  122/44 96.8 °F (36 °C) Axillary 90 (!) 31 96 % --   12/03/22 1011 120/51 -- -- 95 (!) 31 91 % --   12/03/22 0930 128/45 -- -- 102 -- 92 % --   12/03/22 0900 (!) 144/35 -- -- 105 -- 90 % --   12/03/22 0830 132/63 -- -- 109 -- 91 % --   12/03/22 0800 131/57 -- -- 102 -- 92 % --   12/03/22 0629 95/54 -- -- 99 -- 92 % --   12/03/22 0620 (!) 70/40 96.5 °F (35.8 °C) Axillary 88 24 92 % 95.1 kg (209 lb 10.5 oz)   12/03/22 0436 107/41 -- -- 103 -- -- --   12/03/22 0148 114/40 98.5 °F (36.9 °C) Axillary 106 28 97 % --   12/03/22 0100 -- -- -- 107 -- 95 % --   12/03/22 0015 (!) 123/39 98.4 °F (36.9 °C) Oral 109 -- -- --   12/02/22 2313 132/44 -- -- 100 -- 92 % --   12/02/22 2234 (!) 89/68 -- -- 108 -- 100 % --   12/02/22 2229 (!) 136/34 97.8 °F (36.6 °C) Axillary 104 14 98 % --   12/02/22 1710 129/47 97.9 °F (36.6 °C) Axillary 88 26 94 % --     I/O:  I/O last 3 completed shifts:  In: 687 [Other:281; NG/GT:406]  Out: -     Physical Exam:  Physical Exam  Constitutional:       General: She is not in acute distress.     Appearance: Normal appearance. She is not ill-appearing.   HENT:      Head: Normocephalic and atraumatic.      Right Ear: External ear normal.      Left Ear: External ear normal.   Eyes:      Extraocular Movements: Extraocular movements intact.      Conjunctiva/sclera: Conjunctivae normal.   Cardiovascular:      Rate and Rhythm: Normal rate and regular rhythm.   Pulmonary:      Effort: Pulmonary effort is normal. No respiratory distress.   Abdominal:      General: There is no distension.      Palpations: Abdomen is soft.      Tenderness: There is no abdominal tenderness.   Musculoskeletal:         General: No swelling or deformity.   Skin:     General: Skin is warm and dry.   Neurological:      Mental Status: She is alert and oriented to person, place, and time. Mental status is at baseline.         CBC    Results from last 7 days   Lab Units 12/03/22  1255 12/03/22  1110 12/03/22  0336 12/02/22  0331  12/01/22 0319 11/30/22 0257 11/29/22 0333 11/28/22  1103 11/27/22  0328   WBC 10*3/mm3  --   --  27.60* 13.00* 13.30* 12.90* 15.40* 15.00* 13.40*   HEMOGLOBIN g/dL 8.4* 8.4* 9.3* 12.2 12.2 12.5 13.1 13.2 13.6   PLATELETS 10*3/mm3  --   --  360 243 239 226 207 192 200     BMP   Results from last 7 days   Lab Units 12/03/22  0056 12/02/22  0331 12/01/22 0319 11/30/22 0257 11/29/22 0333 11/28/22  1103 11/27/22  0328   SODIUM mmol/L 149* 147* 147* 142 141 139 139   POTASSIUM mmol/L 4.2 4.0 3.8 3.9 3.7 3.7 3.8   CHLORIDE mmol/L 108* 107 108* 104 102 101 103   CO2 mmol/L 28.0 30.0* 29.0 28.0 29.0 28.0 27.0   BUN mg/dL 48* 26* 24* 21 16 12 12   CREATININE mg/dL 1.73* 0.75 0.69 0.65 0.58 0.56* 0.56*   GLUCOSE mg/dL 224* 148* 169* 168* 159* 133* 156*   MAGNESIUM mg/dL 2.3 2.3 2.3 2.1 2.0 1.8  --    PHOSPHORUS mg/dL 5.3* 4.3 3.6 3.5 2.7  --   --      Radiology(recent) CT Abdomen Pelvis Without Contrast    Result Date: 12/3/2022  1.  Large left pelvic hematoma (~16 x 10 x 10 cm), with prominent volume of surrounding blood products, blood products extend to the left rectus sheath hematoma (~8 x 2 x 11). 2.  Lack of intravenous contrast limits evaluation for active bleeding. 3.  Suggestion of aspiration/bronchiolitis (bronchial wall thickening, peribronchial opacities) 4.  Cholelithiasis, slightly distended gallbladder, correlate for right upper quadrant symptoms which could suggest cholecystitis. Report called to JARED Meraz at 12/3/2022 12:21 AM   Electronically signed by:  Amanda Trotter M.D.  12/3/2022 1:00 AM Mountain Time    CT Angiogram Abdomen Pelvis    Result Date: 12/3/2022  1.  Rectus sheath hematoma (~8 x 3 x 12 cm), with a 3 mm focus of active arterial phase bleeding. This extends to a large pelvic hematoma (~16 x 9 x 10 cm), with moderate surrounding blood products. 2.  Cholelithiasis, mildly distended gallbladder. 3.  Evidence of basilar aspiration, bronchiolitis. Report called to  Dr. Rivas at  12/3/2022 4:11 AM  [facility] Electronically signed by:  Amanda Trotter M.D.  12/3/2022 4:47 AM Mountain Time    CT Abdomen Pelvis With Contrast    Result Date: 12/3/2022  1.  Rectus sheath hematoma (~8 x 3 x 12 cm), with a 3 mm focus of active arterial phase bleeding. This extends to a large pelvic hematoma (~16 x 9 x 10 cm), with moderate surrounding blood products. 2.  Cholelithiasis, mildly distended gallbladder. 3.  Evidence of basilar aspiration, bronchiolitis. Report called to  Dr. Rivas at 12/3/2022 4:11 AM  [facility] Electronically signed by:  Amanda Trotter M.D.  12/3/2022 4:47 AM Mountain Time    XR Abdomen KUB    Result Date: 12/2/2022  Feeding tube in the stomach  Electronically Signed By-Able Herbert On:12/2/2022 9:28 PM This report was finalized on [id number] by  Abel Herbert, .      I reviewed the patient's new clinical results.    Assessment & Plan       Cerebrovascular accident (CVA), unspecified mechanism (HCC)    Aphasia    Tobacco abuse    Carotid stenosis, bilateral    Essential hypertension    Feeding difficulty      72-year-old lady with history of multiple strokes, status post stroke last month is nonverbal.  No major abdominal surgical history, no known major vascular surgery history, has not had any recent groin lines or femoral arterial access.  Had been on Plavix since her previous stroke, was on therapeutic Lovenox here and developed a spontaneous retrorectus bleed last night.  CT abdomen pelvis with large retrorectus and extraperitoneal pelvic hematoma, CT repeated with contrast and small blush on the arterial phase but hematoma was not any larger, no obvious bleed from the epigastric or iliac vessels.  Hemoglobin has been trended initially dropped quite significantly but then has been relatively stable.  She was tachycardic last night this is resolved, intermittent hypotension but no evidence of shock.  She is getting 1 unit of blood today.  Hold therapeutic Lovenox, suspect this will  resolve spontaneously.  If she continues to bleed will need interventional radiology consult for possible embolization.  Retrorectus hematoma usually will resolve on its own, if it becomes infected will need interventional radiology drainage.  Continue to trend hemoglobin and monitor for increased tachycardia or hypotension.  I will continue to follow.      This note was created using Dragon Voice Recognition software.    Landry Rivas MD  12/03/22  16:19 EST

## 2022-12-03 NOTE — PROGRESS NOTES
[facility] Medicine Services Daily Progress Note    Patient Name: Cheyenne Estrella  : 1950  MRN: [id number]  Primary Care Physician:  Melba Rosen APRN  Date of admission: 2022      Subjective    Chief Complaint: Severe aphasia     Patient Reports she is tired.  Sat in the chair earlier.  Working with therapy.  Pending PEG on Monday after Plavix is out of the system.     ROS negative except as above       Objective      Vitals:   Temp:  [97.9 °F (36.6 °C)-99.5 °F (37.5 °C)] 97.9 °F (36.6 °C)  Heart Rate:  [78-95] 88  Resp:  [20-26] 26  BP: (129-196)/() 129/47  Flow (L/min):  [2] 2    Physical Exam  Vitals reviewed.   Constitutional:       Comments: Family at bedside  NG tube is in.  Patient appears tired after sitting in the chair.     HENT:      Head: Normocephalic.      Nose: Nose normal.      Mouth/Throat:      Mouth: Mucous membranes are moist.   Cardiovascular:      Rate and Rhythm: Normal rate and regular rhythm.      Pulses: Normal pulses.      Heart sounds: Normal heart sounds.   Pulmonary:      Effort: Pulmonary effort is normal.      Breath sounds: Normal breath sounds.   Abdominal:      General: Abdomen is flat.      Palpations: Abdomen is soft.   Musculoskeletal:         General: Normal range of motion.      Cervical back: Normal range of motion.   Skin:     General: Skin is warm.   Neurological:      General: No focal deficit present.      Mental Status: She is alert and oriented to person, place, and time.      Comments: Patient is aphasic   Psychiatric:         Mood and Affect: Mood normal.         Behavior: Behavior normal.        Result Review    Result Review:  I have personally reviewed the results from the time of this admission to 2022 20:44 EST and agree with these findings:  [x]  Laboratory  []  Microbiology  []  Radiology  []  EKG/Telemetry   []  Cardiology/Vascular   []  Pathology  []  Old records  []  Other:  Most notable findings  include: BUN 26 white count 13         Assessment & Plan       Brief Patient Summary:  Cheyenne Estrella is a 72 y.o. female with PMHx of HTN, HLD, anxiety, CVA who presented to [facility] on 11/24/2022 complaining of aphasia.  Due to condition HPI obtained from family at bedside and available records.  LWK 1730. Patient started slurring speech on phone with family and EMS called.  Patient then became completely aphasic.  Denies chest pain, dyspnea, fevers, chills, numbness, tingling, or gait instability.  Presented to [facility] ED due to possibility of stroke     In the ED, vitals 98.3, HR 79, RR 24, /67, 91% RA.  Labs notable for troponin <0.01, glucose 110.  Stroke team called.  Pt outside window for Tpa.  CT head showed no acute abnormality but did show remote infarct in left basal ganglia extending into the left corona radiata along with small remote infarcts in the left thalamus and right basal ganglia.  CTA head/neck pending.  Patient to be admitted for neurological evaluation.       11/25/2022: Patient family at bedside, patient still unable to speak but appearing to understand words.  MRI showing possible infarct of right basal ganglia, neurology ordering another MRI for evaluation of any further evolving infarct given speech issues.  This is patient's second stroke within a year.  Patient evaluated by speech PT and OT having discussion of possible PEG tube given severity of symptoms     11/26/2022: Patient with no improvement in symptoms.  Still unable to verbalize.  Discussing feeding options with family.     aspirin, 81 mg, Per G Tube, Daily   Or  aspirin, 300 mg, Rectal, Daily  atorvastatin, 80 mg, Per G Tube, Nightly  clopidogrel, 75 mg, Per G Tube, Daily  [START ON 11/28/2022] lisinopril, 5 mg, Nasogastric, Q24H  sertraline, 50 mg, Per G Tube, Daily  sodium chloride, 10 mL, Intravenous, Q12H        dextrose 5 % and sodium chloride 0.45 %, 75 mL/hr, Last Rate: 75 mL/hr (11/26/22  9272)         Active Hospital Problems:          Active Hospital Problems     Diagnosis     • **Cerebrovascular accident (CVA), unspecified mechanism (HCC)     • Carotid stenosis, bilateral     • Essential hypertension     • Aphasia     • Tobacco abuse        Plan:   Aphasia-secondary to underlying infarct, possibility of evolving state.  Patient was established history of previous CVA within the last year was still in the process of rehabbing.  Patient's granddaughter reports patient recently lost her spouse and had great deal of emotional stress, appears stable but not improving  -Neurology evaluated concern for progression or worsening  -Repeat MRI showing new areas of acute infarct in right precentral gyrus cortex and within inferior right frontal lobe  -CTA showing 60% stenosis of right CCA 50% stenosis of left CCA but not actionable at this time  -ED evaluated not criteria for tPA due to questionable timeframe  -Antiplatelet  -Echo showing normal EF no discussion of shunting  -PT/OT/speech  -May need discussion of PEG tube, family aware and starting NG tube feeds  -B12 and thyroid function ordered  -Given underlying carotid disease vascular surgery consulted recommending medical management at this time can follow-up in clinic for further discussion  -Continue speech therapy  -Repeat CT of the head ordered for persistent lethargy.    No acute findings.  -Patient agreed to PEG tube placement and rehab on November 30.  GI consulted.  Will need to wait till Monday due to Plavix.  Patient seems less lethargic on December 1.  Sitting in the chair on December 2     Leukocytosis-increase over the last 24 hours though no signs of focal infection currently  -Continue to trend leukocytosis     Hypertension/hyperlipidemia/anxiety-chronic in nature  -Allow for permissive hypertension  -Resume home medication as clinically appropriate     Persistent neck pain lidocaine patch ordered.  CT soft tissue neck negative     DVT  prophylaxis:  Mechanical DVT prophylaxis orders are present.     CODE STATUS:    Code Status (Patient has no pulse and is not breathing): CPR (Attempt to Resuscitate)  Medical Interventions (Patient has pulse or is breathing): Full Support  Release to patient: Routine Release     FMLA paperwork filled out for patient's daughter     Disposition: Given severity of stroke uncertain discharge time     This patient has been examined wearing appropriate Personal Protective Equipment and discussed with hospital infection control department.     12/02/22      Electronically signed by Martin Cabello MD, 12/02/22, 20:44 EST.  [facility]

## 2022-12-03 NOTE — PROGRESS NOTES
"Pharmacy Antimicrobial Dosing Service    Subjective:  Cheyenne Estrella is a 72 y.o.female.  Pharmacy has been consulted to dose Vancomycin for possible sepsis.      Assessment/Plan    1. Day #1 Vancomycin: Pulse dosing d/t renal dysfxn. 1250mg (~16mg/kg ABW) IV x1 dose 12/3 at 0536. Vancomycin random level 12/3 at 1255= 21.9 mcg/ml. Will redose this evening vancomycin 750 mg X 1 dose (10 mg per kg adjusted weight). Follow up random level ordered for 12/4 with AM labs    2. Day #1 Ceftriaxone: 2gm IV q24h.    Will continue to monitor drug levels, renal function, culture and sensitivities, and patient clinical status.       Objective:  Relevant clinical data and objective history reviewed:  172.7 cm (68\")   95.1 kg (209 lb 10.5 oz)   Ideal body weight: 63.9 kg (140 lb 14 oz)  Adjusted ideal body weight: 76.4 kg (168 lb 6.2 oz)  Body mass index is 31.88 kg/m².    Results from last 7 days   Lab Units 12/03/22  1255   VANCOMYCIN RM mcg/mL 21.90     Results from last 7 days   Lab Units 12/03/22  0056 12/02/22  0331 12/01/22  0319   CREATININE mg/dL 1.73* 0.75 0.69     Estimated Creatinine Clearance: 34.9 mL/min (A) (by C-G formula based on SCr of 1.73 mg/dL (H)).  I/O last 3 completed shifts:  In: 687 [Other:281; NG/GT:406]  Out: -     Results from last 7 days   Lab Units 12/03/22  0336 12/02/22  0331 12/01/22  0319   WBC 10*3/mm3 27.60* 13.00* 13.30*     Temperature    12/03/22 0148 12/03/22 0620 12/03/22 1431   Temp: 98.5 °F (36.9 °C) 96.5 °F (35.8 °C) 96.8 °F (36 °C)     Baseline culture/source/susceptibility:  Microbiology Results (last 10 days)       Procedure Component Value - Date/Time    MRSA Screen, PCR (Inpatient) - Swab, Nares [[id number]]  (Normal) Collected: 12/03/22 0334    Lab Status: Final result Specimen: Swab from Nares Updated: 12/03/22 0459     MRSA PCR No MRSA Detected    Narrative:      The negative predictive value of this diagnostic test is high and should only be used to consider de-escalating " anti-MRSA therapy. A positive result may indicate colonization with MRSA and must be correlated clinically.            Anti-Infectives (From admission, onward)      Ordered     Dose/Rate Route Frequency Start Stop    12/03/22 1431  vancomycin 750 mg in sodium chloride 0.9 % 250 mL IVPB-VTB        Ordering Provider: Martin Cabello MD    750 mg Intravenous Once 12/03/22 1600      12/03/22 0218  Vancomycin HCl 1,250 mg in sodium chloride 0.9 % 250 mL IVPB        Ordering Provider: Neha Meraz APRN    1,250 mg Intravenous Once 12/03/22 0330 12/03/22 0536    12/03/22 0213  cefTRIAXone (ROCEPHIN) 2 g in sodium chloride 0.9 % 100 mL IVPB        Ordering Provider: Neha Meraz APRN    2 g  200 mL/hr over 30 Minutes Intravenous Every 24 Hours 12/03/22 0300 12/10/22 0259    12/03/22 0218  Vancomycin Pharmacy Intermittent/Pulse Dosing        Ordering Provider: Neha Meraz APRN     Does not apply As Needed 12/03/22 0217 12/10/22 0216    12/03/22 0213  Pharmacy to dose vancomycin        Ordering Provider: Neha Meraz APRN     Does not apply Continuous PRN 12/03/22 0212 12/10/22 0211            Keli Arias, PharmD  12/03/22 14:34 EST

## 2022-12-03 NOTE — PROGRESS NOTES
Pt developed acute rectal sheath bleed overnight with Hb dropping from 13 to 8 range with hypotension/sob. Will give 1 u prbc.

## 2022-12-03 NOTE — NURSING NOTE
Pt is restless, moaning,and tachycardic. Pt is aphasic, does not speak, can't state what is wrong. Notified Michelle NP of this and sepsis screening. She stated to wait on fluids but do a lactic, see orders.

## 2022-12-03 NOTE — PROGRESS NOTES
"Pharmacy Antimicrobial Dosing Service    Subjective:  Cheyenne Etsrella is a 72 y.o.female.  Pharmacy has been consulted to dose Vancomycin for possible sepsis.      Assessment/Plan    1. Day #1 Vancomycin: Pulse dosing d/t renal dysfxn. 1250mg (~16mg/kg ABW) IV x1 dose.  Random level ordered for 12/3 at 1800.  Re-dose when less than 20.    2. Day #1 Ceftriaxone: 2gm IV q24h.    Will continue to monitor drug levels, renal function, culture and sensitivities, and patient clinical status.       Objective:  Relevant clinical data and objective history reviewed:  172.7 cm (68\")   77.8 kg (171 lb 8.3 oz)   Ideal body weight: 63.9 kg (140 lb 14 oz)  Adjusted ideal body weight: 69.5 kg (153 lb 2.1 oz)  Body mass index is 26.08 kg/m².        Results from last 7 days   Lab Units 12/03/22  0056 12/02/22  0331 12/01/22  0319   CREATININE mg/dL 1.73* 0.75 0.69     Estimated Creatinine Clearance: 31.8 mL/min (A) (by C-G formula based on SCr of 1.73 mg/dL (H)).  I/O last 3 completed shifts:  In: 1627 [Other:581; NG/GT:1046]  Out: -     Results from last 7 days   Lab Units 12/03/22  0336 12/02/22  0331 12/01/22  0319   WBC 10*3/mm3 27.60* 13.00* 13.30*     Temperature    12/02/22 2229 12/03/22 0015 12/03/22 0148   Temp: 97.8 °F (36.6 °C) 98.4 °F (36.9 °C) 98.5 °F (36.9 °C)     Baseline culture/source/susceptibility:  Microbiology Results (last 10 days)       ** No results found for the last 240 hours. **            Anti-Infectives (From admission, onward)      Ordered     Dose/Rate Route Frequency Start Stop    12/03/22 0218  Vancomycin HCl 1,250 mg in sodium chloride 0.9 % 250 mL IVPB        Ordering Provider: Neha Meraz APRN    1,250 mg Intravenous Once 12/03/22 0330      12/03/22 0213  cefTRIAXone (ROCEPHIN) 2 g in sodium chloride 0.9 % 100 mL IVPB        Ordering Provider: Neha Meraz APRN    2 g  200 mL/hr over 30 Minutes Intravenous Every 24 Hours 12/03/22 0300 12/10/22 0259    12/03/22 0218  Vancomycin Pharmacy " Intermittent/Pulse Dosing        Ordering Provider: Neha Meraz APRN     Does not apply As Needed 12/03/22 0217 12/10/22 0216    12/03/22 0213  Pharmacy to dose vancomycin        Ordering Provider: Neha Meraz APRN     Does not apply Continuous PRN 12/03/22 0212 12/10/22 0211            Froilan Franklin  12/03/22 04:23 EST

## 2022-12-03 NOTE — NURSING NOTE
Spoke to dr. Rivas regarding ABD CT scan. He stated she is okay to stay on MALU, watch hbg and BP, keep NPO in case anything changes, continue to monitor. Will also pass this along to day shift.

## 2022-12-03 NOTE — PLAN OF CARE
Problem: Adult Inpatient Plan of Care  Goal: Plan of Care Review  Outcome: Ongoing, Progressing  Flowsheets (Taken 12/3/2022 0210)  Plan of Care Reviewed With: patient  Goal: Patient-Specific Goal (Individualized)  Outcome: Ongoing, Progressing  Goal: Absence of Hospital-Acquired Illness or Injury  Outcome: Ongoing, Progressing  Intervention: Identify and Manage Fall Risk  Recent Flowsheet Documentation  Taken 12/3/2022 0200 by Jamia Gonzalez RN  Safety Promotion/Fall Prevention:   assistive device/personal items within reach   clutter free environment maintained   lighting adjusted   nonskid shoes/slippers when out of bed   room organization consistent   safety round/check completed  Taken 12/2/2022 2334 by Jamia Gonzalez RN  Safety Promotion/Fall Prevention:   assistive device/personal items within reach   clutter free environment maintained   fall prevention program maintained   lighting adjusted   nonskid shoes/slippers when out of bed   room organization consistent   safety round/check completed  Taken 12/2/2022 2200 by Jamia Gonzalez RN  Safety Promotion/Fall Prevention:   assistive device/personal items within reach   clutter free environment maintained   fall prevention program maintained   lighting adjusted   nonskid shoes/slippers when out of bed   room organization consistent   safety round/check completed  Taken 12/2/2022 2035 by Jamia Gonzalez RN  Safety Promotion/Fall Prevention:   assistive device/personal items within reach   clutter free environment maintained   fall prevention program maintained   lighting adjusted   nonskid shoes/slippers when out of bed   room organization consistent   safety round/check completed  Intervention: Prevent Skin Injury  Recent Flowsheet Documentation  Taken 12/2/2022 2035 by Jamia Gonzalez RN  Body Position:   turned   right  Skin Protection:   adhesive use limited   incontinence pads utilized   tubing/devices free from skin contact  Intervention: Prevent  and Manage VTE (Venous Thromboembolism) Risk  Recent Flowsheet Documentation  Taken 12/2/2022 2035 by Jamia Gonzalez RN  VTE Prevention/Management: sequential compression devices on  Intervention: Prevent Infection  Recent Flowsheet Documentation  Taken 12/3/2022 0200 by Jamia Gonzalez RN  Infection Prevention:   single patient room provided   rest/sleep promoted   personal protective equipment utilized   hand hygiene promoted  Taken 12/2/2022 2334 by Jamia Gonzalez RN  Infection Prevention:   single patient room provided   personal protective equipment utilized   hand hygiene promoted   rest/sleep promoted  Taken 12/2/2022 2200 by Jamia Gonzalez RN  Infection Prevention:   single patient room provided   rest/sleep promoted   personal protective equipment utilized   hand hygiene promoted  Taken 12/2/2022 2035 by Jamia Gonzalez RN  Infection Prevention:   single patient room provided   personal protective equipment utilized   hand hygiene promoted  Goal: Optimal Comfort and Wellbeing  Outcome: Ongoing, Progressing  Intervention: Monitor Pain and Promote Comfort  Recent Flowsheet Documentation  Taken 12/2/2022 2334 by Jamia Gonzalez RN  Pain Management Interventions: see MAR  Intervention: Provide Person-Centered Care  Recent Flowsheet Documentation  Taken 12/2/2022 2035 by Jamia Gonzalez RN  Trust Relationship/Rapport:   care explained   choices provided   questions answered   questions encouraged   thoughts/feelings acknowledged  Goal: Readiness for Transition of Care  Outcome: Ongoing, Progressing     Problem: Adjustment to Illness (Stroke, Ischemic/Transient Ischemic Attack)  Goal: Optimal Coping  Outcome: Ongoing, Progressing     Problem: Bowel Elimination Impaired (Stroke, Ischemic/Transient Ischemic Attack)  Goal: Effective Bowel Elimination  Outcome: Ongoing, Progressing     Problem: Cerebral Tissue Perfusion (Stroke, Ischemic/Transient Ischemic Attack)  Goal: Optimal Cerebral Tissue  Perfusion  Outcome: Ongoing, Progressing     Problem: Cognitive Impairment (Stroke, Ischemic/Transient Ischemic Attack)  Goal: Optimal Cognitive Function  Outcome: Ongoing, Progressing     Problem: Communication Impairment (Stroke, Ischemic/Transient Ischemic Attack)  Goal: Improved Communication Skills  Outcome: Ongoing, Progressing     Problem: Functional Ability Impaired (Stroke, Ischemic/Transient Ischemic Attack)  Goal: Optimal Functional Ability  Outcome: Ongoing, Progressing     Problem: Respiratory Compromise (Stroke, Ischemic/Transient Ischemic Attack)  Goal: Effective Oxygenation and Ventilation  Outcome: Ongoing, Progressing     Problem: Sensorimotor Impairment (Stroke, Ischemic/Transient Ischemic Attack)  Goal: Improved Sensorimotor Function  Outcome: Ongoing, Progressing  Intervention: Optimize Sensory and Perceptual Ability  Recent Flowsheet Documentation  Taken 12/2/2022 2035 by Jamia Gonzalez RN  Pressure Reduction Techniques:   frequent weight shift encouraged   weight shift assistance provided  Pressure Reduction Devices: pressure-redistributing mattress utilized     Problem: Swallowing Impairment (Stroke, Ischemic/Transient Ischemic Attack)  Goal: Optimal Eating and Swallowing without Aspiration  Outcome: Ongoing, Progressing     Problem: Urinary Elimination Impaired (Stroke, Ischemic/Transient Ischemic Attack)  Goal: Effective Urinary Elimination  Outcome: Ongoing, Progressing     Problem: Asthma Comorbidity  Goal: Maintenance of Asthma Control  Outcome: Ongoing, Progressing  Intervention: Maintain Asthma Symptom Control  Recent Flowsheet Documentation  Taken 12/2/2022 2035 by Jamia Gonzalez, RN  Medication Review/Management: medications reviewed     Problem: Behavioral Health Comorbidity  Goal: Maintenance of Behavioral Health Symptom Control  Outcome: Ongoing, Progressing  Intervention: Maintain Behavioral Health Symptom Control  Recent Flowsheet Documentation  Taken 12/2/2022 2035 by  Jamia Gonzalez RN  Medication Review/Management: medications reviewed     Problem: COPD (Chronic Obstructive Pulmonary Disease) Comorbidity  Goal: Maintenance of COPD Symptom Control  Outcome: Ongoing, Progressing  Intervention: Maintain COPD-Symptom Control  Recent Flowsheet Documentation  Taken 12/2/2022 2035 by Jamia Gonzalez RN  Medication Review/Management: medications reviewed     Problem: Diabetes Comorbidity  Goal: Blood Glucose Level Within Targeted Range  Outcome: Ongoing, Progressing     Problem: Heart Failure Comorbidity  Goal: Maintenance of Heart Failure Symptom Control  Outcome: Ongoing, Progressing  Intervention: Maintain Heart Failure-Management  Recent Flowsheet Documentation  Taken 12/2/2022 2035 by Jamia Gonzalez RN  Medication Review/Management: medications reviewed     Problem: Hypertension Comorbidity  Goal: Blood Pressure in Desired Range  Outcome: Ongoing, Progressing  Intervention: Maintain Blood Pressure Management  Recent Flowsheet Documentation  Taken 12/2/2022 2035 by Jamia Gonzalez RN  Medication Review/Management: medications reviewed     Problem: Obstructive Sleep Apnea Risk or Actual Comorbidity Management  Goal: Unobstructed Breathing During Sleep  Outcome: Ongoing, Progressing     Problem: Osteoarthritis Comorbidity  Goal: Maintenance of Osteoarthritis Symptom Control  Outcome: Ongoing, Progressing  Intervention: Maintain Osteoarthritis Symptom Control  Recent Flowsheet Documentation  Taken 12/2/2022 2035 by Jamia Gonzalez RN  Medication Review/Management: medications reviewed     Problem: Pain Chronic (Persistent) (Comorbidity Management)  Goal: Acceptable Pain Control and Functional Ability  Outcome: Ongoing, Progressing  Intervention: Manage Persistent Pain  Recent Flowsheet Documentation  Taken 12/2/2022 2035 by Jamia Gonzalez RN  Medication Review/Management: medications reviewed  Intervention: Develop Pain Management Plan  Recent Flowsheet Documentation  Taken  12/2/2022 2334 by Jamia Gonzalez RN  Pain Management Interventions: see MAR     Problem: Seizure Disorder Comorbidity  Goal: Maintenance of Seizure Control  Outcome: Ongoing, Progressing     Problem: Skin Injury Risk Increased  Goal: Skin Health and Integrity  Outcome: Ongoing, Progressing  Intervention: Optimize Skin Protection  Recent Flowsheet Documentation  Taken 12/2/2022 2035 by Jamia Gonzalez RN  Pressure Reduction Techniques:   frequent weight shift encouraged   weight shift assistance provided  Pressure Reduction Devices: pressure-redistributing mattress utilized  Skin Protection:   adhesive use limited   incontinence pads utilized   tubing/devices free from skin contact     Problem: Fall Injury Risk  Goal: Absence of Fall and Fall-Related Injury  Outcome: Ongoing, Progressing  Intervention: Identify and Manage Contributors  Recent Flowsheet Documentation  Taken 12/2/2022 2035 by Jamia Gonzalez RN  Medication Review/Management: medications reviewed  Intervention: Promote Injury-Free Environment  Recent Flowsheet Documentation  Taken 12/3/2022 0200 by Jamia Gonzalez RN  Safety Promotion/Fall Prevention:   assistive device/personal items within reach   clutter free environment maintained   lighting adjusted   nonskid shoes/slippers when out of bed   room organization consistent   safety round/check completed  Taken 12/2/2022 2334 by Jamia Gonzalez RN  Safety Promotion/Fall Prevention:   assistive device/personal items within reach   clutter free environment maintained   fall prevention program maintained   lighting adjusted   nonskid shoes/slippers when out of bed   room organization consistent   safety round/check completed  Taken 12/2/2022 2200 by Jamia Gonzalez RN  Safety Promotion/Fall Prevention:   assistive device/personal items within reach   clutter free environment maintained   fall prevention program maintained   lighting adjusted   nonskid shoes/slippers when out of bed   room  organization consistent   safety round/check completed  Taken 12/2/2022 2035 by Jamia Gonzalez RN  Safety Promotion/Fall Prevention:   assistive device/personal items within reach   clutter free environment maintained   fall prevention program maintained   lighting adjusted   nonskid shoes/slippers when out of bed   room organization consistent   safety round/check completed     Problem: Aspiration (Enteral Nutrition)  Goal: Absence of Aspiration Signs and Symptoms  Outcome: Ongoing, Progressing     Problem: Device-Related Complication Risk (Enteral Nutrition)  Goal: Safe, Effective Therapy Delivery  Outcome: Ongoing, Progressing     Problem: Feeding Intolerance (Enteral Nutrition)  Goal: Feeding Tolerance  Outcome: Ongoing, Progressing   Goal Outcome Evaluation:  Plan of Care Reviewed With: patient   Pt flagged sepsis with abd pain. Awaiting CT. Family was updated. Sitter at bedside for pt safety. NG in place with tube feeding. Safety precautions maintained and call light within reach.

## 2022-12-04 ENCOUNTER — APPOINTMENT (OUTPATIENT)
Dept: CT IMAGING | Facility: HOSPITAL | Age: 72
End: 2022-12-04

## 2022-12-04 ENCOUNTER — INPATIENT HOSPITAL (OUTPATIENT)
Dept: URBAN - METROPOLITAN AREA HOSPITAL 84 | Facility: HOSPITAL | Age: 72
End: 2022-12-04

## 2022-12-04 DIAGNOSIS — R13.10 DYSPHAGIA, UNSPECIFIED: ICD-10-CM

## 2022-12-04 DIAGNOSIS — I69.320 APHASIA FOLLOWING CEREBRAL INFARCTION: ICD-10-CM

## 2022-12-04 DIAGNOSIS — I69.30 UNSPECIFIED SEQUELAE OF CEREBRAL INFARCTION: ICD-10-CM

## 2022-12-04 DIAGNOSIS — R63.39 OTHER FEEDING DIFFICULTIES: ICD-10-CM

## 2022-12-04 DIAGNOSIS — R74.01 ELEVATION OF LEVELS OF LIVER TRANSAMINASE LEVELS: ICD-10-CM

## 2022-12-04 LAB
ALBUMIN SERPL-MCNC: 2.9 G/DL (ref 3.5–5.2)
ALBUMIN/GLOB SERPL: 0.9 G/DL
ALP SERPL-CCNC: 114 U/L (ref 39–117)
ALT SERPL W P-5'-P-CCNC: 53 U/L (ref 1–33)
ANION GAP SERPL CALCULATED.3IONS-SCNC: 16 MMOL/L (ref 5–15)
ANISOCYTOSIS BLD QL: ABNORMAL
AST SERPL-CCNC: 25 U/L (ref 1–32)
BILIRUB SERPL-MCNC: 0.4 MG/DL (ref 0–1.2)
BUN SERPL-MCNC: 81 MG/DL (ref 8–23)
BUN/CREAT SERPL: 24.5 (ref 7–25)
CALCIUM SPEC-SCNC: 8.3 MG/DL (ref 8.6–10.5)
CHLORIDE SERPL-SCNC: 109 MMOL/L (ref 98–107)
CO2 SERPL-SCNC: 21 MMOL/L (ref 22–29)
CREAT SERPL-MCNC: 3.31 MG/DL (ref 0.57–1)
CRP SERPL-MCNC: 24.07 MG/DL (ref 0–0.5)
D-LACTATE SERPL-SCNC: 1.2 MMOL/L (ref 0.5–2)
DEPRECATED RDW RBC AUTO: 49 FL (ref 37–54)
DEPRECATED RDW RBC AUTO: 51.6 FL (ref 37–54)
EGFRCR SERPLBLD CKD-EPI 2021: 14.2 ML/MIN/1.73
EOSINOPHIL # BLD MANUAL: 0.36 10*3/MM3 (ref 0–0.4)
EOSINOPHIL NFR BLD MANUAL: 1 % (ref 0.3–6.2)
ERYTHROCYTE [DISTWIDTH] IN BLOOD BY AUTOMATED COUNT: 14.5 % (ref 12.3–15.4)
ERYTHROCYTE [DISTWIDTH] IN BLOOD BY AUTOMATED COUNT: 15 % (ref 12.3–15.4)
ERYTHROCYTE [SEDIMENTATION RATE] IN BLOOD: 42 MM/HR (ref 0–30)
GIANT PLATELETS: ABNORMAL
GLOBULIN UR ELPH-MCNC: 3.2 GM/DL
GLUCOSE BLDC GLUCOMTR-MCNC: 139 MG/DL (ref 70–105)
GLUCOSE BLDC GLUCOMTR-MCNC: 147 MG/DL (ref 70–105)
GLUCOSE BLDC GLUCOMTR-MCNC: 148 MG/DL (ref 70–105)
GLUCOSE BLDC GLUCOMTR-MCNC: 160 MG/DL (ref 70–105)
GLUCOSE SERPL-MCNC: 150 MG/DL (ref 65–99)
HCT VFR BLD AUTO: 23.5 % (ref 34–46.6)
HCT VFR BLD AUTO: 25 % (ref 34–46.6)
HCT VFR BLD AUTO: 26.1 % (ref 34–46.6)
HCT VFR BLD AUTO: 27.2 % (ref 34–46.6)
HCT VFR BLD AUTO: 27.6 % (ref 34–46.6)
HGB BLD-MCNC: 7.4 G/DL (ref 12–15.9)
HGB BLD-MCNC: 8.1 G/DL (ref 12–15.9)
HGB BLD-MCNC: 8.5 G/DL (ref 12–15.9)
HGB BLD-MCNC: 8.7 G/DL (ref 12–15.9)
HGB BLD-MCNC: 8.8 G/DL (ref 12–15.9)
LYMPHOCYTES # BLD MANUAL: 1.5 10*3/MM3 (ref 0.7–3.1)
LYMPHOCYTES # BLD MANUAL: 4.64 10*3/MM3 (ref 0.7–3.1)
LYMPHOCYTES NFR BLD MANUAL: 10 % (ref 5–12)
LYMPHOCYTES NFR BLD MANUAL: 4 % (ref 5–12)
MAGNESIUM SERPL-MCNC: 2.4 MG/DL (ref 1.6–2.4)
MCH RBC QN AUTO: 29.3 PG (ref 26.6–33)
MCH RBC QN AUTO: 29.8 PG (ref 26.6–33)
MCHC RBC AUTO-ENTMCNC: 31.6 G/DL (ref 31.5–35.7)
MCHC RBC AUTO-ENTMCNC: 32.6 G/DL (ref 31.5–35.7)
MCV RBC AUTO: 91.5 FL (ref 79–97)
MCV RBC AUTO: 92.7 FL (ref 79–97)
METAMYELOCYTES NFR BLD MANUAL: 1 % (ref 0–0)
METAMYELOCYTES NFR BLD MANUAL: 2 % (ref 0–0)
MONOCYTES # BLD: 1.5 10*3/MM3 (ref 0.1–0.9)
MONOCYTES # BLD: 3.57 10*3/MM3 (ref 0.1–0.9)
NEUTROPHILS # BLD AUTO: 26.42 10*3/MM3 (ref 1.7–7)
NEUTROPHILS # BLD AUTO: 34.22 10*3/MM3 (ref 1.7–7)
NEUTROPHILS NFR BLD MANUAL: 66 % (ref 42.7–76)
NEUTROPHILS NFR BLD MANUAL: 77 % (ref 42.7–76)
NEUTS BAND NFR BLD MANUAL: 14 % (ref 0–5)
NEUTS BAND NFR BLD MANUAL: 8 % (ref 0–5)
PHOSPHATE SERPL-MCNC: 5.5 MG/DL (ref 2.5–4.5)
PLAT MORPH BLD: NORMAL
PLATELET # BLD AUTO: 288 10*3/MM3 (ref 140–450)
PLATELET # BLD AUTO: 290 10*3/MM3 (ref 140–450)
PMV BLD AUTO: 10.7 FL (ref 6–12)
PMV BLD AUTO: 10.8 FL (ref 6–12)
POTASSIUM SERPL-SCNC: 4.6 MMOL/L (ref 3.5–5.2)
PROCALCITONIN SERPL-MCNC: 0.86 NG/ML (ref 0–0.25)
PROT SERPL-MCNC: 6.1 G/DL (ref 6–8.5)
RBC # BLD AUTO: 2.53 10*6/MM3 (ref 3.77–5.28)
RBC # BLD AUTO: 2.85 10*6/MM3 (ref 3.77–5.28)
RBC MORPH BLD: NORMAL
SCAN SLIDE: NORMAL
SCAN SLIDE: NORMAL
SODIUM SERPL-SCNC: 146 MMOL/L (ref 136–145)
TOXIC GRANULATION: ABNORMAL
VANCOMYCIN SERPL-MCNC: 22.1 MCG/ML (ref 5–40)
VARIANT LYMPHS NFR BLD MANUAL: 13 % (ref 19.6–45.3)
VARIANT LYMPHS NFR BLD MANUAL: 4 % (ref 19.6–45.3)
WBC MORPH BLD: NORMAL
WBC NRBC COR # BLD: 35.7 10*3/MM3 (ref 3.4–10.8)
WBC NRBC COR # BLD: 37.6 10*3/MM3 (ref 3.4–10.8)

## 2022-12-04 PROCEDURE — 84145 PROCALCITONIN (PCT): CPT | Performed by: INTERNAL MEDICINE

## 2022-12-04 PROCEDURE — 63710000001 INSULIN LISPRO (HUMAN) PER 5 UNITS: Performed by: NURSE PRACTITIONER

## 2022-12-04 PROCEDURE — 25010000002 CEFEPIME PER 500 MG: Performed by: INTERNAL MEDICINE

## 2022-12-04 PROCEDURE — 85018 HEMOGLOBIN: CPT | Performed by: STUDENT IN AN ORGANIZED HEALTH CARE EDUCATION/TRAINING PROGRAM

## 2022-12-04 PROCEDURE — 85025 COMPLETE CBC W/AUTO DIFF WBC: CPT | Performed by: NURSE PRACTITIONER

## 2022-12-04 PROCEDURE — 86900 BLOOD TYPING SEROLOGIC ABO: CPT

## 2022-12-04 PROCEDURE — P9016 RBC LEUKOCYTES REDUCED: HCPCS

## 2022-12-04 PROCEDURE — 36430 TRANSFUSION BLD/BLD COMPNT: CPT

## 2022-12-04 PROCEDURE — 83735 ASSAY OF MAGNESIUM: CPT | Performed by: INTERNAL MEDICINE

## 2022-12-04 PROCEDURE — 82962 GLUCOSE BLOOD TEST: CPT

## 2022-12-04 PROCEDURE — 99233 SBSQ HOSP IP/OBS HIGH 50: CPT | Performed by: STUDENT IN AN ORGANIZED HEALTH CARE EDUCATION/TRAINING PROGRAM

## 2022-12-04 PROCEDURE — 84100 ASSAY OF PHOSPHORUS: CPT | Performed by: INTERNAL MEDICINE

## 2022-12-04 PROCEDURE — 94664 DEMO&/EVAL PT USE INHALER: CPT

## 2022-12-04 PROCEDURE — 83605 ASSAY OF LACTIC ACID: CPT | Performed by: INTERNAL MEDICINE

## 2022-12-04 PROCEDURE — 80053 COMPREHEN METABOLIC PANEL: CPT | Performed by: INTERNAL MEDICINE

## 2022-12-04 PROCEDURE — 99232 SBSQ HOSP IP/OBS MODERATE 35: CPT | Performed by: NURSE PRACTITIONER

## 2022-12-04 PROCEDURE — 25010000002 MORPHINE PER 10 MG: Performed by: INTERNAL MEDICINE

## 2022-12-04 PROCEDURE — 94799 UNLISTED PULMONARY SVC/PX: CPT

## 2022-12-04 PROCEDURE — 85007 BL SMEAR W/DIFF WBC COUNT: CPT | Performed by: NURSE PRACTITIONER

## 2022-12-04 PROCEDURE — 86140 C-REACTIVE PROTEIN: CPT | Performed by: INTERNAL MEDICINE

## 2022-12-04 PROCEDURE — 85014 HEMATOCRIT: CPT | Performed by: STUDENT IN AN ORGANIZED HEALTH CARE EDUCATION/TRAINING PROGRAM

## 2022-12-04 PROCEDURE — 74176 CT ABD & PELVIS W/O CONTRAST: CPT

## 2022-12-04 PROCEDURE — 80202 ASSAY OF VANCOMYCIN: CPT | Performed by: INTERNAL MEDICINE

## 2022-12-04 PROCEDURE — 94761 N-INVAS EAR/PLS OXIMETRY MLT: CPT

## 2022-12-04 RX ORDER — MIDODRINE HYDROCHLORIDE 5 MG/1
10 TABLET ORAL
Status: DISCONTINUED | OUTPATIENT
Start: 2022-12-04 | End: 2022-12-09 | Stop reason: HOSPADM

## 2022-12-04 RX ORDER — BISACODYL 10 MG
10 SUPPOSITORY, RECTAL RECTAL ONCE
Status: DISCONTINUED | OUTPATIENT
Start: 2022-12-04 | End: 2022-12-09 | Stop reason: HOSPADM

## 2022-12-04 RX ADMIN — MORPHINE SULFATE 1 MG: 2 INJECTION, SOLUTION INTRAMUSCULAR; INTRAVENOUS at 18:05

## 2022-12-04 RX ADMIN — SODIUM CHLORIDE, POTASSIUM CHLORIDE, SODIUM LACTATE AND CALCIUM CHLORIDE 250 ML: 600; 310; 30; 20 INJECTION, SOLUTION INTRAVENOUS at 17:55

## 2022-12-04 RX ADMIN — Medication 10 ML: at 21:48

## 2022-12-04 RX ADMIN — INSULIN LISPRO 2 UNITS: 100 INJECTION, SOLUTION INTRAVENOUS; SUBCUTANEOUS at 02:00

## 2022-12-04 RX ADMIN — MORPHINE SULFATE 1 MG: 2 INJECTION, SOLUTION INTRAMUSCULAR; INTRAVENOUS at 12:04

## 2022-12-04 RX ADMIN — CEFEPIME 2 G: 2 INJECTION, POWDER, FOR SOLUTION INTRAVENOUS at 03:54

## 2022-12-04 RX ADMIN — SODIUM CHLORIDE 150 ML/HR: 4.5 INJECTION, SOLUTION INTRAVENOUS at 13:39

## 2022-12-04 RX ADMIN — MORPHINE SULFATE 1 MG: 2 INJECTION, SOLUTION INTRAMUSCULAR; INTRAVENOUS at 21:58

## 2022-12-04 RX ADMIN — LIDOCAINE 1 PATCH: 50 PATCH CUTANEOUS at 12:05

## 2022-12-04 RX ADMIN — IPRATROPIUM BROMIDE AND ALBUTEROL SULFATE 3 ML: 2.5; .5 SOLUTION RESPIRATORY (INHALATION) at 18:46

## 2022-12-04 RX ADMIN — SODIUM CHLORIDE 150 ML/HR: 4.5 INJECTION, SOLUTION INTRAVENOUS at 21:48

## 2022-12-04 RX ADMIN — Medication 10 ML: at 13:47

## 2022-12-04 RX ADMIN — IPRATROPIUM BROMIDE AND ALBUTEROL SULFATE 3 ML: 2.5; .5 SOLUTION RESPIRATORY (INHALATION) at 06:50

## 2022-12-04 RX ADMIN — IPRATROPIUM BROMIDE AND ALBUTEROL SULFATE 3 ML: 2.5; .5 SOLUTION RESPIRATORY (INHALATION) at 14:55

## 2022-12-04 RX ADMIN — MORPHINE SULFATE 1 MG: 2 INJECTION, SOLUTION INTRAMUSCULAR; INTRAVENOUS at 03:51

## 2022-12-04 RX ADMIN — IPRATROPIUM BROMIDE AND ALBUTEROL SULFATE 3 ML: 2.5; .5 SOLUTION RESPIRATORY (INHALATION) at 11:35

## 2022-12-04 NOTE — PROGRESS NOTES
RENAL/K[facility]:    Consult received, chart reviewed, orders entered, consult note to follow.  Patient with KVNG likely from hypotension and acute blood loss anemia.  Also with CTA today which may exacerbated KVNG.  Hypernatremic.  Continue IVF with 1/2NS and follow-up urine studies.      Catalino Kapadia M.D.  [facility]

## 2022-12-04 NOTE — CONSULTS
[facility]   Consult Note    Patient Name: Cheyenne Estrella  : 1950  MRN: [id number]  Primary Care Physician:  Melba Rosen APRN  Referring Physician: SARTHAK Gilliland  Date of admission: 2022    Consults  Subjective   Subjective     Reason for Consult/ Chief Complaint: chaparro    History of Present Illness  Cheyenne Estrella is a 72 y.o. female admitted with stroke. Hospitalization complicated by rectus sheath hematoma and acute blood loss.  Now with chaparro.  S/p CTA x2 yesterday as well as rising wbc requiring iv antibiotics    Review of Systems     Personal History     Past Medical History:   Diagnosis Date   • Depression    • Hypertension    • Tobacco abuse        Past Surgical History:   Procedure Laterality Date   • KNEE SURGERY         Family History: family history includes Aneurysm in her mother; Anuerysm in her mother; Breast cancer in her mother; Cancer in her mother; Hypertension in her maternal grandmother and mother; No Known Problems in her brother, daughter, father, maternal aunt, maternal grandfather, maternal uncle, paternal aunt, paternal grandfather, paternal grandmother, paternal uncle, and sister; Stroke in her mother. Otherwise pertinent FHx was reviewed and not pertinent to current issue.    Social History:  reports that she has been smoking cigarettes. She started smoking about 54 years ago. She has a 54.00 pack-year smoking history. She has never used smokeless tobacco. She reports that she does not currently use alcohol. She reports that she does not currently use drugs.    Home Medications:   amLODIPine, aspirin, atorvastatin, lisinopril, and sertraline    Allergies:  No Known Allergies    Objective    Objective     Vitals:  Temp:  [96.8 °F (36 °C)-99.5 °F (37.5 °C)] 97.8 °F (36.6 °C)  Heart Rate:  [85-99] 85  Resp:  [19-33] 24  BP: ()/(31-51) 150/44  Flow (L/min):  [3-14] 11    Physical Exam  General Appearance:  In no acute distress.    HEENT: Normal HEENT  exam.     Extremities: .  There is no deformity, effusion or dependent edema.    Neurological: Patient is awake, lerthargic    Result Review    Result Review:  I have personally reviewed the results from the time of this admission to 12/4/2022 09:56 EST and agree with these findings:  []  Laboratory list / accordion  []  Microbiology  []  Radiology  []  EKG/Telemetry   []  Cardiology/Vascular   []  Pathology  []  Old records  []  Other:        Assessment & Plan   Assessment / Plan     Brief Patient Summary:  Cheyenne Estrella is a 72 y.o. female who has chaparro after cta and blood loss    Active Hospital Problems:  Active Hospital Problems    Diagnosis    • **Cerebrovascular accident (CVA), unspecified mechanism (HCC)    • Carotid stenosis, bilateral    • Essential hypertension    • Feeding difficulty    • Aphasia    • Tobacco abuse      Plan:   1. chaparro  2. Blood loss anemia  3. MARIAH  4. Rectus sheath hematoma    Patient seen and examined. Labs and chart reviewed. Family at bedside. With worsening of renal function over past 48 hours.  S/p blood loss and iv contrast. Likely atn from both. Currently minimal uop.  D/w family. If no improvement in next 24-48 hours, may need dialysis.  Will follow      Alexa Carpio MD

## 2022-12-04 NOTE — PLAN OF CARE
Goal Outcome Evaluation:  Plan of Care Reviewed With: patient        Progress: no change  Outcome Evaluation: SLP continues to follow for dysphagia/speech interventions. Continuous recommendations for NPO from initial swallow evaluation 11.27, continued to recommend NPO with multiple re-evaluations over last x7 days with likely need for long term enteral nutrition.     New onset rectus sheath hematoma, extending to large pelvic hematoma 12.3.22.     General surgery following. SLP to hold 12.4.22, will follow up as medical status improves.

## 2022-12-04 NOTE — PROGRESS NOTES
Nutrition Services    Patient Name:  Cheyenne Estrella  YOB: 1950  MRN: [id number]  Admit Date:  11/24/2022    Progress note to check on plan for nutrition. TF order has been canceled as of early morning 12/3 per MD for possible abd bleed. General surgery following. SLP eval today 12/4 recommends NPO.     When enteral feeds are to be restarted as medical status improves, please re-consult RD to order.     Electronically signed by:  Mayra Godoy RD  12/04/22 11:53 EST

## 2022-12-04 NOTE — PROGRESS NOTES
Events noted  Discussed with family  Patient very restless    Events around rectal sheath hematoma noted  Anticoagulation held    She is very restless but it does not look like anything acute neurologic and unfortunately not much we can do at this moment considering her hemorrhage    She was neurologically improving and even I was told that she is sitting in bedside chair but now has taken a step back    Family is concerned  We will follow-up

## 2022-12-04 NOTE — PROGRESS NOTES
General Surgery Progress Note    Name: Cheyenne Estrella ADMIT: 2022   : 1950  PCP: Melba Rosen APRN    MRN: [id number] LOS: 10 days   AGE/SEX: 72 y.o. female  ROOM: [address] [place] [address]    Chief Complaint   Patient presents with   • Speech Problem     Subjective     Still appears to be in pain, nonverbal so unable to get history from patient    Objective     Scheduled Medications:   atorvastatin, 80 mg, Per G Tube, Nightly  bisacodyl, 10 mg, Rectal, Once  [START ON 2022] cefepime, 2 g, Intravenous, Q24H  insulin lispro, 2-7 Units, Subcutaneous, Q6H  ipratropium-albuterol, 3 mL, Nebulization, 4x Daily - RT  lidocaine, 1 patch, Transdermal, Q24H  midodrine, 5 mg, Nasogastric, TID AC  sertraline, 50 mg, Per G Tube, Daily  sodium chloride, 10 mL, Intravenous, Q12H        Active Infusions:  sodium chloride, 150 mL/hr, Last Rate: 150 mL/hr (22 1117)        As Needed Medications:  •  bisacodyl  •  dextrose  •  dextrose  •  glucagon (human recombinant)  •  hydrALAZINE  •  labetalol  •  Morphine  •  ondansetron  •  sodium chloride  •  sodium chloride  •  sodium chloride  •  sodium chloride  •  traMADol    Vital Signs  Vital Signs Patient Vitals for the past 24 hrs:   BP Temp Temp src Pulse Resp SpO2 Weight   22 1138 -- -- -- (!) 122 27 94 % --   22 1135 -- -- -- 100 22 94 % --   22 1037 132/41 97.6 °F (36.4 °C) Axillary 88 24 98 % --   22 0653 -- -- -- 85 24 96 % --   22 0650 -- -- -- 89 19 97 % --   22 0548 150/44 97.8 °F (36.6 °C) Oral 99 25 95 % 97 kg (213 lb 13.5 oz)   22 0346 96/50 98.3 °F (36.8 °C) Axillary 99 26 96 % --   22 0208 131/40 99.4 °F (37.4 °C) Oral 93 27 94 % --   22 0115 (!) 129/38 99.4 °F (37.4 °C) Axillary 96 27 -- --   22 0100 119/43 99.5 °F (37.5 °C) Axillary 95 28 97 % --   22 0031 115/46 99.3 °F (37.4 °C) Axillary 96 25 98 % --   22 (!) 105/31 98.9 °F (37.2 °C) Axillary 97 (!) 32 92 % --    12/03/22 2012 -- -- -- 94 (!) 32 92 % --   12/03/22 2008 -- -- -- 95 (!) 30 90 % --   12/03/22 1751 130/45 97.3 °F (36.3 °C) Axillary 98 (!) 33 95 % --   12/03/22 1431 122/44 96.8 °F (36 °C) Axillary 90 (!) 31 96 % --     I/O:  I/O last 3 completed shifts:  In: 396.3 [Blood:396.3]  Out: -     Physical Exam:  Physical Exam  Constitutional:       General: She is not in acute distress.     Appearance: Normal appearance. She is not ill-appearing.   HENT:      Head: Normocephalic and atraumatic.      Right Ear: External ear normal.      Left Ear: External ear normal.   Eyes:      Extraocular Movements: Extraocular movements intact.      Conjunctiva/sclera: Conjunctivae normal.   Cardiovascular:      Rate and Rhythm: Normal rate and regular rhythm.   Pulmonary:      Effort: Pulmonary effort is normal. No respiratory distress.   Abdominal:      General: There is no distension.      Palpations: Abdomen is soft.      Comments: Lower abdominal tenderness   Musculoskeletal:         General: No swelling or deformity.   Skin:     General: Skin is warm and dry.   Neurological:      General: No focal deficit present.      Mental Status: She is alert. Mental status is at baseline.         Results Review:     CBC    Results from last 7 days   Lab Units 12/04/22  1159 12/04/22  0605 12/03/22  2357 12/03/22  1926 12/03/22  1255 12/03/22  1110 12/03/22  0336 12/02/22  0331 12/01/22  0319 11/30/22  0257 11/29/22  0333 11/28/22  1103   WBC 10*3/mm3  --   --  37.60*  --   --   --  27.60* 13.00* 13.30* 12.90* 15.40* 15.00*   HEMOGLOBIN g/dL 8.8* 8.7* 7.4* 8.0* 8.4* 8.4* 9.3* 12.2 12.2 12.5 13.1 13.2   PLATELETS 10*3/mm3  --   --  290  --   --   --  360 243 239 226 207 192     BMP   Results from last 7 days   Lab Units 12/04/22  0605 12/03/22  0056 12/02/22  0331 12/01/22  0319 11/30/22  0257 11/29/22  0333 11/28/22  1103   SODIUM mmol/L 146* 149* 147* 147* 142 141 139   POTASSIUM mmol/L 4.6 4.2 4.0 3.8 3.9 3.7 3.7   CHLORIDE mmol/L 109*  108* 107 108* 104 102 101   CO2 mmol/L 21.0* 28.0 30.0* 29.0 28.0 29.0 28.0   BUN mg/dL 81* 48* 26* 24* 21 16 12   CREATININE mg/dL 3.31* 1.73* 0.75 0.69 0.65 0.58 0.56*   GLUCOSE mg/dL 150* 224* 148* 169* 168* 159* 133*   MAGNESIUM mg/dL 2.4 2.3 2.3 2.3 2.1 2.0 1.8   PHOSPHORUS mg/dL 5.5* 5.3* 4.3 3.6 3.5 2.7  --      Radiology(recent) CT Abdomen Pelvis Without Contrast    Result Date: 12/3/2022  1.  Large left pelvic hematoma (~16 x 10 x 10 cm), with prominent volume of surrounding blood products, blood products extend to the left rectus sheath hematoma (~8 x 2 x 11). 2.  Lack of intravenous contrast limits evaluation for active bleeding. 3.  Suggestion of aspiration/bronchiolitis (bronchial wall thickening, peribronchial opacities) 4.  Cholelithiasis, slightly distended gallbladder, correlate for right upper quadrant symptoms which could suggest cholecystitis. Report called to JARED Meraz at 12/3/2022 12:21 AM   Electronically signed by:  Amanda Trotter M.D.  12/3/2022 1:00 AM Mountain Time    CT Angiogram Abdomen Pelvis    Result Date: 12/3/2022  1.  Rectus sheath hematoma (~8 x 3 x 12 cm), with a 3 mm focus of active arterial phase bleeding. This extends to a large pelvic hematoma (~16 x 9 x 10 cm), with moderate surrounding blood products. 2.  Cholelithiasis, mildly distended gallbladder. 3.  Evidence of basilar aspiration, bronchiolitis. Report called to  Dr. Rivas at 12/3/2022 4:11 AM  [facility] Electronically signed by:  Amanda Trotter M.D.  12/3/2022 4:47 AM Mountain Time    CT Abdomen Pelvis With Contrast    Result Date: 12/3/2022  1.  Rectus sheath hematoma (~8 x 3 x 12 cm), with a 3 mm focus of active arterial phase bleeding. This extends to a large pelvic hematoma (~16 x 9 x 10 cm), with moderate surrounding blood products. 2.  Cholelithiasis, mildly distended gallbladder. 3.  Evidence of basilar aspiration, bronchiolitis. Report called to  Dr. Rivas at 12/3/2022 4:11 AM  MST Electronically signed by:   Amanda Trotter M.D.  12/3/2022 4:47 AM Mountain Time    XR Chest 1 View    Result Date: 12/3/2022  Patchy left basilar consolidation suspicious for pneumonia.  Electronically Signed By-Krystian Roberts MD On:12/3/2022 5:59 PM This report was finalized on [id number] by  Krystian Roberts MD.    XR Abdomen KUB    Result Date: 12/2/2022  Feeding tube in the stomach  Electronically Signed By-Abel Herbert On:12/2/2022 9:28 PM This report was finalized on [id number] by  Abel Herbert, .      I reviewed the patient's new clinical results.    Assessment & Plan       Cerebrovascular accident (CVA), unspecified mechanism (HCC)    Aphasia    Tobacco abuse    Carotid stenosis, bilateral    Essential hypertension    Feeding difficulty          72-year-old lady with history of multiple strokes, status post stroke last month is nonverbal.  No major abdominal surgical history, no known major vascular surgery history, has not had any recent groin lines or femoral arterial access.  Had been on Plavix since her previous stroke, was on therapeutic Lovenox here and developed a spontaneous retrorectus bleed. CT abdomen pelvis with large retrorectus and extraperitoneal pelvic hematoma, CT repeated with contrast and small blush on the arterial phase but hematoma was not any larger, no obvious bleed from the epigastric or iliac vessels.      She has responded to 1 unit blood transfusion but her hemoglobin continues to trend down slowly.  Discussed with Dr. Trujillo, he will discuss with interventional radiologist and likely repeat CT abdomen pelvis without contrast.  If hematoma larger or hemoglobin continues to drop patient may require embolization.  Acute kidney injury likely from hypotension and blood loss as well as IV contrast, nephrology following.  We will get renal ultrasound to evaluate for hydronephrosis possible compression of her ureter and/or bladder by hematoma, will place [facility].  Continue to trend creatinine.  Leukocytosis  likely reactive, if patient develops fever or hypotension will need work-up for infectious cause.          This note was created using Dragon Voice Recognition software.    Landry Rivas MD  12/04/22  12:37 EST

## 2022-12-04 NOTE — SIGNIFICANT NOTE
12/04/22 1301   OTHER   Discipline physical therapy assistant   Rehab Time/Intention   Session Not Performed unable to treat, medical status change  (Pt found to have rectal hematoma sheath and has been pretty much nonresponsive since beginning of this weekend. Family stated she wasn't able to participate with PT today.)   Recommendation   PT - Next Appointment 12/05/22

## 2022-12-04 NOTE — CONSULTS
[facility]: [facility]         CONSULT NOTE    Patient Identification:  Cheyenne Estrella  72 y.o.  female  1950  [id number]            Requesting physician: Attending physician    Reason for Consultation: Respiratory failure, tachypnea and hypoxia      History of Present Illness:  72-year-old female admitted to the hospital 11/24/2022 with complaints of aphasia and CT scan showed remote infarct in left basal ganglia and a smaller 1 on the right side.  During admission she was on Plavix and therapeutic Lovenox but she was found to have retrorectal hematoma and required transfusion of 1 unit of blood and holding her blood thinners.  She started to have shortness of breath and hypoxemia and currently requiring 11 L of high flow nasal cannula.    Assessment:  Cerebrovascular accident (CVA), unspecified mechanism (HCC)  Hypoxia  Bibasilar opacities: Atelectasis versus early pneumonia  Aphasia  KVNG  Spontaneous retrorectal hematoma  Acute anemia due to blood loss in the hematoma  History of tobacco smoking  Bilateral carotid stenosis  Essential hypertension      Recommendations:  IV antibiotics: Cefepime started 12/3/2022  Oxygen supplement and titration to maintain saturation 90 to 95%  Bronchodilatores  Antibiotics: Cefepime started 12/3/2022 and given 1 dose of vancomycin  Blood pressure support: Midodrine  Monitor hemoglobin transfuse as needed for hemoglobin less than 7  Atorvastatin  Blood pressure support: Midodrine  Patient is followed by nephrology, general surgery  DVT/GI prophylaxis  Check daily labs and correct electrolytes as needed        Review of Sytems:  Unobtainable due to aphasia    Past Medical History:  Past Medical History:   Diagnosis Date   • Depression    • Hypertension    • Tobacco abuse        Past Surgical History:  Past Surgical History:   Procedure Laterality Date   • KNEE SURGERY          Home Meds:  Medications Prior to Admission   Medication Sig Dispense Refill Last Dose  "  • amLODIPine (NORVASC) 10 MG tablet Take 1 tablet by mouth Daily. 90 tablet 1 Unknown   • aspirin 325 MG tablet Take 1 tablet by mouth Daily.   Unknown   • atorvastatin (LIPITOR) 80 MG tablet Take 1 tablet by mouth Every Night. 90 tablet 1 Unknown   • lisinopril (PRINIVIL,ZESTRIL) 20 MG tablet Take 1 tablet by mouth Daily. 90 tablet 1 Unknown   • sertraline (ZOLOFT) 50 MG tablet Take 1 tablet by mouth Daily. 90 tablet 1 Unknown       Allergies:  No Known Allergies    Social History:   Social History     Socioeconomic History   • Marital status:    Tobacco Use   • Smoking status: Every Day     Packs/day: 1.00     Years: 54.00     Pack years: 54.00     Types: Cigarettes     Start date:      Last attempt to quit:      Years since quittin.9   • Smokeless tobacco: Never   Vaping Use   • Vaping Use: Never used   Substance and Sexual Activity   • Alcohol use: Not Currently   • Drug use: Not Currently   • Sexual activity: Not Currently       Family History:  Family History   Problem Relation Age of Onset   • Hypertension Mother    • Stroke Mother    • Aneurysm Mother    • Breast cancer Mother    • Anuerysm Mother    • Cancer Mother    • No Known Problems Father    • No Known Problems Sister    • No Known Problems Brother    • No Known Problems Daughter    • No Known Problems Maternal Aunt    • No Known Problems Maternal Uncle    • No Known Problems Paternal Aunt    • No Known Problems Paternal Uncle    • Hypertension Maternal Grandmother    • No Known Problems Maternal Grandfather    • No Known Problems Paternal Grandmother    • No Known Problems Paternal Grandfather        Physical Exam:  /44 (BP Location: Left arm, Patient Position: Lying)   Pulse 85   Temp 97.8 °F (36.6 °C) (Oral)   Resp 24   Ht 172.7 cm (68\")   Wt 97 kg (213 lb 13.5 oz)   SpO2 96%   BMI 32.52 kg/m²  Body mass index is 32.52 kg/m². 96% 97 kg (213 lb 13.5 oz)  General Appearance:  Alert but aphasic  HEENT:  Normocephalic, " without obvious abnormality, Conjunctiva/corneas clear,.   Nares normal, no drainage     Neck:  Supple, symmetrical, trachea midline. No JVD.  Lungs /Chest wall:   Bilateral basal rhonchi, respirations unlabored, symmetrical wall movement.     Heart:  Regular rate and rhythm, S1 S2 normal  Abdomen: Soft, non-tender, no masses, no organomegaly.    Extremities: No edema, no clubbing or cyanosis    LABS:  Lab Results   Component Value Date    CALCIUM 8.3 (L) 12/04/2022    PHOS 5.5 (H) 12/04/2022     Results from last 7 days   Lab Units 12/04/22  0605 12/03/22  2357 12/03/22  1926 12/03/22  1255 12/03/22  1110 12/03/22  0336 12/03/22  0056 12/02/22 0331   MAGNESIUM mg/dL 2.4  --   --   --   --   --  2.3 2.3   SODIUM mmol/L 146*  --   --   --   --   --  149* 147*   POTASSIUM mmol/L 4.6  --   --   --   --   --  4.2 4.0   CHLORIDE mmol/L 109*  --   --   --   --   --  108* 107   CO2 mmol/L 21.0*  --   --   --   --   --  28.0 30.0*   BUN mg/dL 81*  --   --   --   --   --  48* 26*   CREATININE mg/dL 3.31*  --   --   --   --   --  1.73* 0.75   GLUCOSE mg/dL 150*  --   --   --   --   --  224* 148*   CALCIUM mg/dL 8.3*  --   --   --   --   --  9.3 9.9   WBC 10*3/mm3  --  37.60*  --   --   --  27.60*  --  13.00*   HEMOGLOBIN g/dL 8.7* 7.4* 8.0* 8.4*   < > 9.3*  --  12.2   PLATELETS 10*3/mm3  --  290  --   --   --  360  --  243   ALT (SGPT) U/L 53*  --   --   --   --   --  92* 139*   AST (SGOT) U/L 25  --   --   --   --   --  41* 111*   PROBNP pg/mL  --   --   --  2,191.0*  --   --   --   --    PROCALCITONIN ng/mL 0.86*  --   --   --   --   --   --   --     < > = values in this interval not displayed.     Lab Results   Component Value Date    TROPONINT <0.010 11/24/2022         Results from last 7 days   Lab Units 12/03/22  0335 12/03/22  0328   BLOODCX  No growth at 24 hours No growth at 24 hours     Results from last 7 days   Lab Units 12/04/22  0605 12/03/22  0336 12/03/22  0056   PROCALCITONIN ng/mL 0.86*  --   --    LACTATE  mmol/L 1.2 1.8 2.2*     Results from last 7 days   Lab Units 12/03/22  1314   PH, ARTERIAL pH units 7.443   PCO2, ARTERIAL mm Hg 36.6   PO2 ART mm Hg 63.9*   O2 SATURATION ART % 93.0*   MODALITY  Cannula         Results from last 7 days   Lab Units 12/03/22  0336   INR  1.05     Results from last 7 days   Lab Units 12/03/22  0335 12/03/22  0328   BLOODCX  No growth at 24 hours No growth at 24 hours     Lab Results   Component Value Date    TSH 7.340 (H) 11/24/2022     Estimated Creatinine Clearance: 18.4 mL/min (A) (by C-G formula based on SCr of 3.31 mg/dL (H)).  Results from last 7 days   Lab Units 11/27/22  2220   NITRITE UA  Negative   WBC UA /HPF 0-2*   BACTERIA UA /HPF None Seen   SQUAM EPITHEL UA /HPF 3-6*        Imaging:  Imaging Results (Last 24 Hours)     Procedure Component Value Units Date/Time    XR Chest 1 View [[id number]] Collected: 12/03/22 1758     Updated: 12/03/22 1801    Narrative:      DATE OF EXAM:  12/3/2022 5:50 PM     PROCEDURE:  XR CHEST 1 VW-     INDICATIONS:  Respiratory failure.      COMPARISON:  11/29/2022.     TECHNIQUE:   Single radiographic view of the chest was obtained.     FINDINGS:  There is patchy left basilar consolidation suspicious for new pneumonia.  The lungs and pleural spaces are otherwise clear. The heart size is  normal. The pulmonary vascular markings are normal. There is a feeding  tube in place with the tip projecting out of the field-of-view.  There  are mild chronic age-related changes involving the bony thorax and  thoracic aorta.       Impression:      Patchy left basilar consolidation suspicious for pneumonia.     Electronically Signed By-Krystian Roberts MD On:12/3/2022 5:59 PM  This report was finalized on [id number] by  Krystian Roberts MD.    CT Angiogram Abdomen Pelvis [[id number]] Collected: 12/03/22 0411     Updated: 12/03/22 0851    Narrative:      EXAM:  CTA ABDOMEN AND PELVIS WITH IV CONTRAST, CT ABDOMEN AND PELVIS WITH IV CONTRAST   DATE: 12/3/2022 4:35  AM    HISTORY: Pelvic and rectus hematoma evaluate for active arterial bleed      TECHNIQUE: CTA abdomen and pelvis was performed following the intravenous administration of 100 mL Isovue-370 . Sagittal, coronal and 3-D reformatted images were provided. CT dose lowering techniques were used, to include: automated exposure control,   adjustment for patient size, and or use of iterative reconstruction.    COMPARISON:  None.      FINDINGS:    ABDOMEN/PELVIS:    LOWER CHEST: Normal.     LIVER: Normal.      GALLBLADDER/BILIARY: Cholelithiasis. Mildly distended gallbladder. Basilar bronchial thickening, left greater than right lung base mucous plugging. Slight tree-in-bud nodularity at the right middle lobe.     PANCREAS: Normal.     SPLEEN: Normal.      ADRENAL GLANDS: Nodular left adrenal gland thickening, left adrenal 16 mm nodule, measures 8 Hounsfield units on noncontrast imaging done earlier same day, compatible with an adenoma    KIDNEYS/URETERS/BLADDER: Moderate right renal atrophy. Normal bladder    GI TRACT: Single enteric tube in the distal stomach. Normal bowel. Normal appendix (series 6/image 98)    MESENTERY/PERITONEUM: Left pelvic heterogeneous soft tissue mass suggestive of hematoma (15.8 x 9 x 9.6 cm).  At least moderate/large amount of adjacent stranding, and blood products. Hematoma displaces rectum and pelvic structures rightward.    REPRODUCTIVE: Normal pelvis.    VASCULATURE: Marked mural calcifications in a normal caliber aorta  .       LYMPH NODES: Normal    ABDOMINAL WALL: Rectus sheath hematoma (8 x 3.2 x 12.3 cm). Arterial images demonstrate small 3 mm focus of vascular blush within the rectus sheath hematoma (series 5/image 109), that dissipates on portal venous phase imaging. Upper abdominal wall 1.2 cm   subcutaneous sebaceous cyst     MUSCULOSKELETAL: Normal.        Impression:        1.  Rectus sheath hematoma (~8 x 3 x 12 cm), with a 3 mm focus of active arterial phase bleeding. This  extends to a large pelvic hematoma (~16 x 9 x 10 cm), with moderate surrounding blood products.  2.  Cholelithiasis, mildly distended gallbladder.  3.  Evidence of basilar aspiration, bronchiolitis.    Report called to  Dr. Rivas at 12/3/2022 4:11 AM  MST    Electronically signed by:  Amanda Trotter M.D.    12/3/2022 4:47 AM Mountain Time    CT Abdomen Pelvis With Contrast [[id number]] Collected: 12/03/22 0411     Updated: 12/03/22 0851    Narrative:      EXAM:  CTA ABDOMEN AND PELVIS WITH IV CONTRAST, CT ABDOMEN AND PELVIS WITH IV CONTRAST   DATE: 12/3/2022 4:35 AM    HISTORY: Pelvic and rectus hematoma evaluate for active arterial bleed      TECHNIQUE: CTA abdomen and pelvis was performed following the intravenous administration of 100 mL Isovue-370 . Sagittal, coronal and 3-D reformatted images were provided. CT dose lowering techniques were used, to include: automated exposure control,   adjustment for patient size, and or use of iterative reconstruction.    COMPARISON:  None.      FINDINGS:    ABDOMEN/PELVIS:    LOWER CHEST: Normal.     LIVER: Normal.      GALLBLADDER/BILIARY: Cholelithiasis. Mildly distended gallbladder. Basilar bronchial thickening, left greater than right lung base mucous plugging. Slight tree-in-bud nodularity at the right middle lobe.     PANCREAS: Normal.     SPLEEN: Normal.      ADRENAL GLANDS: Nodular left adrenal gland thickening, left adrenal 16 mm nodule, measures 8 Hounsfield units on noncontrast imaging done earlier same day, compatible with an adenoma    KIDNEYS/URETERS/BLADDER: Moderate right renal atrophy. Normal bladder    GI TRACT: Single enteric tube in the distal stomach. Normal bowel. Normal appendix (series 6/image 98)    MESENTERY/PERITONEUM: Left pelvic heterogeneous soft tissue mass suggestive of hematoma (15.8 x 9 x 9.6 cm).  At least moderate/large amount of adjacent stranding, and blood products. Hematoma displaces rectum and pelvic structures  rightward.    REPRODUCTIVE: Normal pelvis.    VASCULATURE: Marked mural calcifications in a normal caliber aorta  .       LYMPH NODES: Normal    ABDOMINAL WALL: Rectus sheath hematoma (8 x 3.2 x 12.3 cm). Arterial images demonstrate small 3 mm focus of vascular blush within the rectus sheath hematoma (series 5/image 109), that dissipates on portal venous phase imaging. Upper abdominal wall 1.2 cm   subcutaneous sebaceous cyst     MUSCULOSKELETAL: Normal.        Impression:        1.  Rectus sheath hematoma (~8 x 3 x 12 cm), with a 3 mm focus of active arterial phase bleeding. This extends to a large pelvic hematoma (~16 x 9 x 10 cm), with moderate surrounding blood products.  2.  Cholelithiasis, mildly distended gallbladder.  3.  Evidence of basilar aspiration, bronchiolitis.    Report called to  Dr. Rivas at 12/3/2022 4:11 AM  MST    Electronically signed by:  Amanda Trotter M.D.    12/3/2022 4:47 AM Mountain Time            Current Meds:   SCHEDULE  atorvastatin, 80 mg, Per G Tube, Nightly  cefepime, 2 g, Intravenous, Q12H  insulin lispro, 2-7 Units, Subcutaneous, Q6H  ipratropium-albuterol, 3 mL, Nebulization, 4x Daily - RT  lidocaine, 1 patch, Transdermal, Q24H  midodrine, 5 mg, Nasogastric, TID AC  sertraline, 50 mg, Per G Tube, Daily  sodium chloride, 10 mL, Intravenous, Q12H      Infusions  Pharmacy to dose vancomycin,   sodium chloride, 150 mL/hr, Last Rate: 150 mL/hr (12/03/22 1117)      PRNs  •  bisacodyl  •  dextrose  •  dextrose  •  glucagon (human recombinant)  •  hydrALAZINE  •  labetalol  •  Morphine  •  ondansetron  •  Pharmacy to dose vancomycin  •  sodium chloride  •  sodium chloride  •  sodium chloride  •  sodium chloride  •  traMADol  •  Vancomycin Pharmacy Intermittent/Pulse Dosing        Tomasa Pelaez MD  12/4/2022  08:22 EST      Much of this encounter note is an electronic transcription/translation of spoken language to printed text using Dragon Software.

## 2022-12-04 NOTE — PROGRESS NOTES
LOS: 10 days   Patient Care Team:  Melba Rosen APRN as PCP - General (Nurse Practitioner)      Subjective    None     Interval History:   Rectal sheath hematoma noted on CT 12/3/22. Dr Rivas is following.   Labs: Sodium 146, potassium 4.6, creatinine 3.31 up from 1.73 yesterday.  CRP 24.07.  White blood cell count went from 27-37.6, hemoglobin is 8.7 after receiving a unit of blood.  Platelets are 290.    Patient is having brown stool per nursing staff.  NG tube is clamped.    ROS:   None        Medication Review:     Current Facility-Administered Medications:   •  atorvastatin (LIPITOR) tablet 80 mg, 80 mg, Per G Tube, Nightly, Landry Christine MD, 80 mg at 12/02/22 2309  •  bisacodyl (DULCOLAX) suppository 10 mg, 10 mg, Rectal, Daily PRN, Katy Henao,   •  bisacodyl (DULCOLAX) suppository 10 mg, 10 mg, Rectal, Once, Doreen Rasheed APRN  •  [START ON 12/5/2022] cefepime 2 gm IVPB in 100 ml NS (MBP), 2 g, Intravenous, Q24H, Martin Cabello MD  •  dextrose (D50W) (25 g/50 mL) IV injection 25 g, 25 g, Intravenous, Q15 Min PRN, Alsop, Neha L, APRN  •  dextrose (GLUTOSE) oral gel 15 g, 15 g, Oral, Q15 Min PRN, Alsop, Neha L, APRN  •  glucagon (human recombinant) (GLUCAGEN DIAGNOSTIC) 1 mg in sterile water (preservative free) 1 mL injection, 1 mg, Intramuscular, Q15 Min PRN, Alsop, Neha L, APRN  •  hydrALAZINE (APRESOLINE) injection 10 mg, 10 mg, Intravenous, Q6H PRN, Martin Cabello MD, 10 mg at 12/02/22 0340  •  insulin lispro (ADMELOG) injection 2-7 Units, 2-7 Units, Subcutaneous, Q6H, Alsop, Neha L, APRN, 2 Units at 12/04/22 0200  •  ipratropium-albuterol (DUO-NEB) nebulizer solution 3 mL, 3 mL, Nebulization, 4x Daily - RT, Martin Cabello MD, 3 mL at 12/04/22 1135  •  labetalol (NORMODYNE,TRANDATE) injection 10 mg, 10 mg, Intravenous, Q2H PRN, Katy Henao, DO  •  lidocaine (LIDODERM) 5 % 1 patch, 1 patch, Transdermal, Q24H, Martin Cabello MD, 1 patch at 12/04/22 1205  •  midodrine (PROAMATINE)  tablet 5 mg, 5 mg, Nasogastric, TID AC, Martin Cabello MD  •  morphine injection 1 mg, 1 mg, Intravenous, Q3H PRN, Martin Cabello MD, 1 mg at 12/04/22 1204  •  ondansetron (ZOFRAN) injection 4 mg, 4 mg, Intravenous, Q6H PRN, Katy Henao,   •  sertraline (ZOLOFT) tablet 50 mg, 50 mg, Per G Tube, Daily, Landry Christine MD, 50 mg at 12/02/22 0855  •  sodium chloride 0.45 % infusion, 150 mL/hr, Intravenous, Continuous, Catalino Kapadia MD, Last Rate: 150 mL/hr at 12/03/22 1117, 150 mL/hr at 12/03/22 1117  •  sodium chloride 0.9 % flush 10 mL, 10 mL, Intravenous, PRN, Katy Henao,   •  sodium chloride 0.9 % flush 10 mL, 10 mL, Intravenous, Q12H, Katy Henao DO, 10 mL at 12/03/22 2141  •  sodium chloride 0.9 % flush 10 mL, 10 mL, Intravenous, PRN, Katy Henao,   •  sodium chloride 0.9 % infusion 40 mL, 40 mL, Intravenous, PRN, Katy Henao DO  •  sodium chloride 0.9 % infusion 40 mL, 40 mL, Intravenous, PRN, Sophia Castellano D, APRN  •  traMADol (ULTRAM) tablet 50 mg, 50 mg, Nasogastric, Q6H PRN, Alsomaria t, Neha L, APRN      Objective     Nursing staff at bedside placing Williamson catheter.  Patient not awake.  Chronically ill-appearing.     Vital Signs  Temp:  [96.8 °F (36 °C)-99.5 °F (37.5 °C)] 97.6 °F (36.4 °C)  Heart Rate:  [] 122  Resp:  [19-33] 27  BP: ()/(31-50) 132/41  Physical Exam:    General Appearance:    Chronically ill-appearing.   Head:    Normocephalic, without obvious abnormality   Eyes:          Conjunctivae normal, anicteric sclerae   Ears:    Hearing intact   Throat:   No oral lesions, no thrush, oral mucosa moist   Neck:   No adenopathy, supple, no JVD   Lungs:     respirations regular, even and unlabored       Abdomen:     Normal bowel sounds, soft, non-tender, no rebound or guarding, non-distended, no hepatosplenomegaly   Rectal:     Deferred   Extremities:   No edema, no cyanosis, no redness   Skin:   No bleeding, bruising or rash, no jaundice    Neurologic:   Cranial nerves 2 - 12 grossly intact, no asterixis, sensation   intact        Results Review:    CBC    Results from last 7 days   Lab Units 12/04/22  1159 12/04/22  0605 12/03/22  2357 12/03/22  1926 12/03/22  1255 12/03/22  1110 12/03/22  0336 12/02/22  0331 12/01/22  0319 11/30/22  0257 11/29/22  0333 11/28/22  1103   WBC 10*3/mm3  --   --  37.60*  --   --   --  27.60* 13.00* 13.30* 12.90* 15.40* 15.00*   HEMOGLOBIN g/dL 8.8* 8.7* 7.4* 8.0* 8.4* 8.4* 9.3* 12.2 12.2 12.5 13.1 13.2   PLATELETS 10*3/mm3  --   --  290  --   --   --  360 243 239 226 207 192     CMP   Results from last 7 days   Lab Units 12/04/22  0605 12/03/22  0056 12/02/22  0331 12/01/22  0319 11/30/22  0257 11/29/22  0333 11/28/22  1103   SODIUM mmol/L 146* 149* 147* 147* 142 141 139   POTASSIUM mmol/L 4.6 4.2 4.0 3.8 3.9 3.7 3.7   CHLORIDE mmol/L 109* 108* 107 108* 104 102 101   CO2 mmol/L 21.0* 28.0 30.0* 29.0 28.0 29.0 28.0   BUN mg/dL 81* 48* 26* 24* 21 16 12   CREATININE mg/dL 3.31* 1.73* 0.75 0.69 0.65 0.58 0.56*   GLUCOSE mg/dL 150* 224* 148* 169* 168* 159* 133*   ALBUMIN g/dL 2.90* 3.30* 3.50 3.40* 3.50 3.80  --    BILIRUBIN mg/dL 0.4 0.5 0.4 0.4 0.5 0.6  --    ALK PHOS U/L 114 135* 171* 159* 128* 120*  --    AST (SGOT) U/L 25 41* 111* 134* 85* 35*  --    ALT (SGPT) U/L 53* 92* 139* 127* 67* 30  --    MAGNESIUM mg/dL 2.4 2.3 2.3 2.3 2.1 2.0 1.8   PHOSPHORUS mg/dL 5.5* 5.3* 4.3 3.6 3.5 2.7  --      Cr Clearance Estimated Creatinine Clearance: 18.4 mL/min (A) (by C-G formula based on SCr of 3.31 mg/dL (H)).  Coag   Results from last 7 days   Lab Units 12/03/22  0336   INR  1.05   APTT seconds 33.1*     HbA1C   Lab Results   Component Value Date    HGBA1C 5.4 11/25/2022    HGBA1C 5.6 02/18/2022     Blood Glucose   Glucose   Date/Time Value Ref Range Status   12/04/2022 1153 148 (H) 70 - 105 mg/dL Final     Comment:     Serial Number: [id number]:  [id number]   12/04/2022 0631 147 (H) 70 - 105 mg/dL Final     Comment:      Serial Number: [id number]:  [id number]   12/04/2022 0005 160 (H) 70 - 105 mg/dL Final     Comment:     Serial Number: [id number]:  [id number]   12/03/2022 1702 154 (H) 70 - 105 mg/dL Final     Comment:     Serial Number: [id number]:  [id number]   12/03/2022 1209 159 (H) 70 - 105 mg/dL Final     Comment:     Serial Number: [id number]:  [id number]   12/03/2022 0623 151 (H) 70 - 105 mg/dL Final     Comment:     Serial Number: [id number]:  [id number]     Infection   Results from last 7 days   Lab Units 12/04/22  0605 12/03/22  0335 12/03/22  0328   BLOODCX   --  No growth at 24 hours No growth at 24 hours   PROCALCITONIN ng/mL 0.86*  --   --      UA    Results from last 7 days   Lab Units 11/27/22  2220   NITRITE UA  Negative   WBC UA /HPF 0-2*   BACTERIA UA /HPF None Seen   SQUAM EPITHEL UA /HPF 3-6*     Radiology(recent) CT Abdomen Pelvis Without Contrast    Result Date: 12/3/2022  1.  Large left pelvic hematoma (~16 x 10 x 10 cm), with prominent volume of surrounding blood products, blood products extend to the left rectus sheath hematoma (~8 x 2 x 11). 2.  Lack of intravenous contrast limits evaluation for active bleeding. 3.  Suggestion of aspiration/bronchiolitis (bronchial wall thickening, peribronchial opacities) 4.  Cholelithiasis, slightly distended gallbladder, correlate for right upper quadrant symptoms which could suggest cholecystitis. Report called to JARED Meraz at 12/3/2022 12:21 AM   Electronically signed by:  Amanda Trotter M.D.  12/3/2022 1:00 AM Mountain Time    CT Angiogram Abdomen Pelvis    Result Date: 12/3/2022  1.  Rectus sheath hematoma (~8 x 3 x 12 cm), with a 3 mm focus of active arterial phase bleeding. This extends to a large pelvic hematoma (~16 x 9 x 10 cm), with moderate surrounding blood products. 2.  Cholelithiasis, mildly distended gallbladder. 3.  Evidence of basilar aspiration, bronchiolitis. Report called to  Dr. Rivas at 12/3/2022 4:11 AM  MST  Electronically signed by:  Amanda Trotter M.D.  12/3/2022 4:47 AM Mountain Time    CT Abdomen Pelvis With Contrast    Result Date: 12/3/2022  1.  Rectus sheath hematoma (~8 x 3 x 12 cm), with a 3 mm focus of active arterial phase bleeding. This extends to a large pelvic hematoma (~16 x 9 x 10 cm), with moderate surrounding blood products. 2.  Cholelithiasis, mildly distended gallbladder. 3.  Evidence of basilar aspiration, bronchiolitis. Report called to  Dr. Rivas at 12/3/2022 4:11 AM  MST Electronically signed by:  Amanda Trotter M.D.  12/3/2022 4:47 AM Mountain Time    XR Chest 1 View    Result Date: 12/3/2022  Patchy left basilar consolidation suspicious for pneumonia.  Electronically Signed By-Krystian Roberts MD On:12/3/2022 5:59 PM This report was finalized on [id number] by  Krystian Roberts MD.    XR Abdomen KUB    Result Date: 12/2/2022  Feeding tube in the stomach  Electronically Signed By-Abel Herbert On:12/2/2022 9:28 PM This report was finalized on [id number] by  Abel Herbert, .        Assessment & Plan   -Feeding difficulty secondary to dysphagia/acute CVA  -Aphasia  -Acute CVA  -Elevated LFTs  -Hypertension  -Hyperlipidemia  -Anxiety  -History of CVA     PLAN:  Patient is a 72-year-old female with history of CVA, hypertension, and hyperlipidemia who presented on 11/24 with complaints of aphasia and weakness.  MRI shows new areas of acute infarct in the right precentral gyrus cortex and within the inferior right frontal lobe.  Neurology is following.  GI has been asked to consult for possible PEG tube placement.     Plan was for PEG tube to be placed on 12/5/2022 after Plavix was held for 5 days.    Patient has developed rectal sheath hematoma.  General surgery is following.    Patient has had to receive 1 unit of packed red blood cells last night due to drop in hemoglobin.    Kidney function went from 1-3 overnight.    Consider consulting interventional radiology to review films.    White blood  cell count continues to rise from 27-37.    LFTs improved.  ALT is elevated at 53.  Liver ultrasound was done on 11/30/2022 that showed suspected mild intrahepatic biliary ductal dilation with normal caliber common bile duct.  Cholelithiasis with gallbladder wall thickening.  Case discussed with Dr. Rivas.  Supportive care.        Doreen Rasheed, SARTHAK  12/04/22  12:35 EST

## 2022-12-04 NOTE — PROGRESS NOTES
[facility] Medicine Services Daily Progress Note    Patient Name: Cheyenne Estrella  : 1950  MRN: [id number]  Primary Care Physician:  Melba Rosen APRN  Date of admission: 2022      Subjective      Chief Complaint: Severe aphasia     Patient somewhat restless.  Continues to grab at things and twisting around in the bed.  Oxygen requirement stable around 8 to 10 L high flow nasal cannula.  Blood pressure has been stable.  Hemoglobin has been stable.  Creatinine worse today due to hypotension due to blood loss anemia.  Family requested ICU evaluation.  Order placed.     ROS negative except as above       Objective      Vitals:   Temp:  [97.3 °F (36.3 °C)-99.5 °F (37.5 °C)] 98.3 °F (36.8 °C)  Heart Rate:  [] 90  Resp:  [19-33] 22  BP: ()/(31-50) 116/36  Flow (L/min):  [7-14] 11    Physical Exam  Vitals reviewed.   Constitutional:       Comments: Family at bedside  NG tube is in.  Patient appears restless but has much better color in her face today after blood.     HENT:      Head: Normocephalic.      Nose: Nose normal.      Mouth/Throat:      Mouth: Mucous membranes are moist.   Cardiovascular:      Rate and Rhythm: Normal rate and regular rhythm.      Pulses: Normal pulses.      Heart sounds: Normal heart sounds.   Pulmonary:      Effort: Pulmonary effort is normal.      Breath sounds: Normal breath sounds.   Abdominal:      General: Abdomen is flat.      Palpations: Abdomen is soft.   Musculoskeletal:         General: Normal range of motion.      Cervical back: Normal range of motion.   Skin:     General: Skin is warm.   Neurological:      General: No focal deficit present.      Mental Status: She is alert and oriented to person, place, and time.      Comments: Patient is aphasic   Psychiatric:         Mood and Affect: Mood normal.         Behavior: Behavior normal.                        Result Review    Result Review:  I have personally reviewed the results  from the time of this admission to 12/4/2022 16:37 EST and agree with these findings:  [x]  Laboratory  []  Microbiology  []  Radiology  []  EKG/Telemetry   []  Cardiology/Vascular   []  Pathology  []  Old records  []  Other:  Most notable findings include: Hemoglobin above 8.  Significant leukocytosis         Assessment & Plan       Brief Patient Summary:  Cheyenne Estrella is a 72 y.o. female with PMHx of HTN, HLD, anxiety, CVA who presented to [facility] on 11/24/2022 complaining of aphasia.  Due to condition HPI obtained from family at bedside and available records.  LWK 1730. Patient started slurring speech on phone with family and EMS called.  Patient then became completely aphasic.  Denies chest pain, dyspnea, fevers, chills, numbness, tingling, or gait instability.  Presented to [facility] ED due to possibility of stroke     In the ED, vitals 98.3, HR 79, RR 24, /67, 91% RA.  Labs notable for troponin <0.01, glucose 110.  Stroke team called.  Pt outside window for Tpa.  CT head showed no acute abnormality but did show remote infarct in left basal ganglia extending into the left corona radiata along with small remote infarcts in the left thalamus and right basal ganglia.  CTA head/neck pending.  Patient to be admitted for neurological evaluation.            Active Hospital Problems:          Active Hospital Problems     Diagnosis     • **Cerebrovascular accident (CVA), unspecified mechanism (HCC)     • Carotid stenosis, bilateral     • Essential hypertension     • Aphasia     • Tobacco abuse        Plan:   Rectus sheath hematoma  Monitor H&H closely  Transfuse if less than 8.  Hemoglobin stable on December 4 after blood ordered yesterday        Aphasia-secondary to underlying infarct, possibility of evolving state.  Patient was established history of previous CVA within the last year was still in the process of rehabbing.  Patient's granddaughter reports patient recently lost her spouse and had  great deal of emotional stress, appears stable but not improving  -Neurology evaluated concern for progression or worsening  -Repeat MRI showing new areas of acute infarct in right precentral gyrus cortex and within inferior right frontal lobe  -CTA showing 60% stenosis of right CCA 50% stenosis of left CCA but not actionable at this time  -ED evaluated not criteria for tPA due to questionable timeframe  -Antiplatelet  -Echo showing normal EF no discussion of shunting  -PT/OT/speech  -May need discussion of PEG tube, family aware and starting NG tube feeds  -B12 and thyroid function ordered  -Given underlying carotid disease vascular surgery consulted recommending medical management at this time can follow-up in clinic for further discussion  -Continue speech therapy  -Repeat CT of the head ordered for persistent lethargy.    No acute findings.  -Patient agreed to PEG tube placement and rehab on November 30.  GI consulted.  Will need to wait till Monday due to Plavix.  Patient seems less lethargic on December 1.  Sitting in the chair on December 2.  Patient lethargic on December 3 and 4 due to blood loss anemia but also restless at the same time     Leukocytosis-left-sided pneumonia on imaging.  Likely reactive as well      Hypertension/hyperlipidemia/anxiety-chronic in nature  -Allow for permissive hypertension  -Resume home medication as clinically appropriate     Acute renal failure  Likely due to hypotension due to hemorrhage  Nephrology consulted     Acute respiratory failure  ABG shows hypoxemia with PO2 in the 60s  Pulmonary consulted  Chest x-ray ordered shows left-sided consolidation/pneumonia.  Supplemental oxygen and pulmonary consult ordered     Persistent neck pain lidocaine patch ordered.  CT soft tissue neck negative     DVT prophylaxis:  Mechanical DVT prophylaxis orders are present.     CODE STATUS:    Code Status (Patient has no pulse and is not breathing): CPR (Attempt to Resuscitate)  Medical  Interventions (Patient has pulse or is breathing): Full Support  Release to patient: Routine Release     FMLA paperwork filled out for patient's daughter     Disposition: Given severity of stroke uncertain discharge time     This patient has been examined wearing appropriate Personal Protective Equipment and discussed with hospital infection control department.    12/04/22      Electronically signed by Martin Cabello MD, 12/04/22, 16:37 EST.  [facility] Team

## 2022-12-05 ENCOUNTER — INPATIENT HOSPITAL (OUTPATIENT)
Dept: URBAN - METROPOLITAN AREA HOSPITAL 84 | Facility: HOSPITAL | Age: 72
End: 2022-12-05

## 2022-12-05 ENCOUNTER — ANESTHESIA EVENT (OUTPATIENT)
Dept: CARDIOLOGY | Facility: HOSPITAL | Age: 72
End: 2022-12-05

## 2022-12-05 ENCOUNTER — ANESTHESIA (OUTPATIENT)
Dept: CARDIOLOGY | Facility: HOSPITAL | Age: 72
End: 2022-12-05

## 2022-12-05 ENCOUNTER — APPOINTMENT (OUTPATIENT)
Dept: ULTRASOUND IMAGING | Facility: HOSPITAL | Age: 72
End: 2022-12-05

## 2022-12-05 DIAGNOSIS — I69.30 UNSPECIFIED SEQUELAE OF CEREBRAL INFARCTION: ICD-10-CM

## 2022-12-05 DIAGNOSIS — I10 ESSENTIAL (PRIMARY) HYPERTENSION: ICD-10-CM

## 2022-12-05 DIAGNOSIS — I69.320 APHASIA FOLLOWING CEREBRAL INFARCTION: ICD-10-CM

## 2022-12-05 DIAGNOSIS — R63.39 OTHER FEEDING DIFFICULTIES: ICD-10-CM

## 2022-12-05 DIAGNOSIS — R74.01 ELEVATION OF LEVELS OF LIVER TRANSAMINASE LEVELS: ICD-10-CM

## 2022-12-05 DIAGNOSIS — R13.10 DYSPHAGIA, UNSPECIFIED: ICD-10-CM

## 2022-12-05 LAB
ALBUMIN SERPL-MCNC: 2.8 G/DL (ref 3.5–5.2)
ALBUMIN/GLOB SERPL: 0.8 G/DL
ALP SERPL-CCNC: 111 U/L (ref 39–117)
ALT SERPL W P-5'-P-CCNC: 49 U/L (ref 1–33)
ANION GAP SERPL CALCULATED.3IONS-SCNC: 14 MMOL/L (ref 5–15)
AST SERPL-CCNC: 30 U/L (ref 1–32)
BASOPHILS # BLD AUTO: 0.1 10*3/MM3 (ref 0–0.2)
BASOPHILS NFR BLD AUTO: 0.2 % (ref 0–1.5)
BH BB BLOOD EXPIRATION DATE: NORMAL
BH BB BLOOD TYPE BARCODE: 7300
BH BB DISPENSE STATUS: NORMAL
BH BB PRODUCT CODE: NORMAL
BH BB UNIT NUMBER: NORMAL
BILIRUB SERPL-MCNC: 0.3 MG/DL (ref 0–1.2)
BUN SERPL-MCNC: 85 MG/DL (ref 8–23)
BUN/CREAT SERPL: 37.3 (ref 7–25)
CALCIUM SPEC-SCNC: 8.5 MG/DL (ref 8.6–10.5)
CHLORIDE SERPL-SCNC: 113 MMOL/L (ref 98–107)
CO2 SERPL-SCNC: 22 MMOL/L (ref 22–29)
CREAT SERPL-MCNC: 2.28 MG/DL (ref 0.57–1)
CROSSMATCH INTERPRETATION: NORMAL
CRP SERPL-MCNC: 31.37 MG/DL (ref 0–0.5)
DEPRECATED RDW RBC AUTO: 50.3 FL (ref 37–54)
EGFRCR SERPLBLD CKD-EPI 2021: 22.2 ML/MIN/1.73
EOSINOPHIL # BLD AUTO: 0 10*3/MM3 (ref 0–0.4)
EOSINOPHIL NFR BLD AUTO: 0.2 % (ref 0.3–6.2)
ERYTHROCYTE [DISTWIDTH] IN BLOOD BY AUTOMATED COUNT: 14.8 % (ref 12.3–15.4)
ERYTHROCYTE [SEDIMENTATION RATE] IN BLOOD: 40 MM/HR (ref 0–30)
GLOBULIN UR ELPH-MCNC: 3.4 GM/DL
GLUCOSE BLDC GLUCOMTR-MCNC: 104 MG/DL (ref 70–105)
GLUCOSE BLDC GLUCOMTR-MCNC: 115 MG/DL (ref 70–105)
GLUCOSE BLDC GLUCOMTR-MCNC: 122 MG/DL (ref 70–105)
GLUCOSE BLDC GLUCOMTR-MCNC: 125 MG/DL (ref 70–105)
GLUCOSE BLDC GLUCOMTR-MCNC: 132 MG/DL (ref 70–105)
GLUCOSE SERPL-MCNC: 130 MG/DL (ref 65–99)
HCT VFR BLD AUTO: 22.7 % (ref 34–46.6)
HCT VFR BLD AUTO: 28.8 % (ref 34–46.6)
HGB BLD-MCNC: 7.4 G/DL (ref 12–15.9)
HGB BLD-MCNC: 9.1 G/DL (ref 12–15.9)
LYMPHOCYTES # BLD AUTO: 1.5 10*3/MM3 (ref 0.7–3.1)
LYMPHOCYTES NFR BLD AUTO: 4.9 % (ref 19.6–45.3)
MAGNESIUM SERPL-MCNC: 2.6 MG/DL (ref 1.6–2.4)
MCH RBC QN AUTO: 29.8 PG (ref 26.6–33)
MCHC RBC AUTO-ENTMCNC: 32.7 G/DL (ref 31.5–35.7)
MCV RBC AUTO: 91.4 FL (ref 79–97)
MONOCYTES # BLD AUTO: 1.9 10*3/MM3 (ref 0.1–0.9)
MONOCYTES NFR BLD AUTO: 6.4 % (ref 5–12)
NEUTROPHILS NFR BLD AUTO: 26.4 10*3/MM3 (ref 1.7–7)
NEUTROPHILS NFR BLD AUTO: 88.3 % (ref 42.7–76)
NRBC BLD AUTO-RTO: 0 /100 WBC (ref 0–0.2)
PHOSPHATE SERPL-MCNC: 4.9 MG/DL (ref 2.5–4.5)
PLATELET # BLD AUTO: 250 10*3/MM3 (ref 140–450)
PMV BLD AUTO: 10.4 FL (ref 6–12)
POTASSIUM SERPL-SCNC: 3.9 MMOL/L (ref 3.5–5.2)
PROCALCITONIN SERPL-MCNC: 0.55 NG/ML (ref 0–0.25)
PROT SERPL-MCNC: 6.2 G/DL (ref 6–8.5)
RBC # BLD AUTO: 2.48 10*6/MM3 (ref 3.77–5.28)
SODIUM SERPL-SCNC: 149 MMOL/L (ref 136–145)
UNIT  ABO: NORMAL
UNIT  RH: NORMAL
WBC NRBC COR # BLD: 29.9 10*3/MM3 (ref 3.4–10.8)

## 2022-12-05 PROCEDURE — 25010000002 MORPHINE PER 10 MG: Performed by: INTERNAL MEDICINE

## 2022-12-05 PROCEDURE — 94799 UNLISTED PULMONARY SVC/PX: CPT

## 2022-12-05 PROCEDURE — 99232 SBSQ HOSP IP/OBS MODERATE 35: CPT | Performed by: STUDENT IN AN ORGANIZED HEALTH CARE EDUCATION/TRAINING PROGRAM

## 2022-12-05 PROCEDURE — 36430 TRANSFUSION BLD/BLD COMPNT: CPT

## 2022-12-05 PROCEDURE — 85025 COMPLETE CBC W/AUTO DIFF WBC: CPT | Performed by: INTERNAL MEDICINE

## 2022-12-05 PROCEDURE — 94664 DEMO&/EVAL PT USE INHALER: CPT

## 2022-12-05 PROCEDURE — 85014 HEMATOCRIT: CPT | Performed by: INTERNAL MEDICINE

## 2022-12-05 PROCEDURE — 84100 ASSAY OF PHOSPHORUS: CPT | Performed by: INTERNAL MEDICINE

## 2022-12-05 PROCEDURE — 85018 HEMOGLOBIN: CPT | Performed by: INTERNAL MEDICINE

## 2022-12-05 PROCEDURE — 80053 COMPREHEN METABOLIC PANEL: CPT | Performed by: INTERNAL MEDICINE

## 2022-12-05 PROCEDURE — 84145 PROCALCITONIN (PCT): CPT | Performed by: INTERNAL MEDICINE

## 2022-12-05 PROCEDURE — 25010000002 CEFEPIME PER 500 MG: Performed by: INTERNAL MEDICINE

## 2022-12-05 PROCEDURE — 76775 US EXAM ABDO BACK WALL LIM: CPT

## 2022-12-05 PROCEDURE — 82962 GLUCOSE BLOOD TEST: CPT

## 2022-12-05 PROCEDURE — P9016 RBC LEUKOCYTES REDUCED: HCPCS

## 2022-12-05 PROCEDURE — 99232 SBSQ HOSP IP/OBS MODERATE 35: CPT | Performed by: NURSE PRACTITIONER

## 2022-12-05 PROCEDURE — 83735 ASSAY OF MAGNESIUM: CPT | Performed by: INTERNAL MEDICINE

## 2022-12-05 PROCEDURE — 86140 C-REACTIVE PROTEIN: CPT | Performed by: INTERNAL MEDICINE

## 2022-12-05 PROCEDURE — 85652 RBC SED RATE AUTOMATED: CPT | Performed by: INTERNAL MEDICINE

## 2022-12-05 PROCEDURE — 94761 N-INVAS EAR/PLS OXIMETRY MLT: CPT

## 2022-12-05 PROCEDURE — 86900 BLOOD TYPING SEROLOGIC ABO: CPT

## 2022-12-05 RX ADMIN — CEFEPIME 2 G: 2 INJECTION, POWDER, FOR SOLUTION INTRAVENOUS at 17:22

## 2022-12-05 RX ADMIN — IPRATROPIUM BROMIDE AND ALBUTEROL SULFATE 3 ML: 2.5; .5 SOLUTION RESPIRATORY (INHALATION) at 14:50

## 2022-12-05 RX ADMIN — ATORVASTATIN CALCIUM 80 MG: 40 TABLET, FILM COATED ORAL at 20:00

## 2022-12-05 RX ADMIN — Medication 10 ML: at 10:48

## 2022-12-05 RX ADMIN — SODIUM CHLORIDE 150 ML/HR: 4.5 INJECTION, SOLUTION INTRAVENOUS at 04:53

## 2022-12-05 RX ADMIN — Medication 10 ML: at 20:00

## 2022-12-05 RX ADMIN — MORPHINE SULFATE 1 MG: 2 INJECTION, SOLUTION INTRAMUSCULAR; INTRAVENOUS at 10:15

## 2022-12-05 RX ADMIN — IPRATROPIUM BROMIDE AND ALBUTEROL SULFATE 3 ML: 2.5; .5 SOLUTION RESPIRATORY (INHALATION) at 11:44

## 2022-12-05 RX ADMIN — SODIUM CHLORIDE 100 ML/HR: 4.5 INJECTION, SOLUTION INTRAVENOUS at 20:00

## 2022-12-05 RX ADMIN — IPRATROPIUM BROMIDE AND ALBUTEROL SULFATE 3 ML: 2.5; .5 SOLUTION RESPIRATORY (INHALATION) at 07:08

## 2022-12-05 RX ADMIN — MORPHINE SULFATE 1 MG: 2 INJECTION, SOLUTION INTRAMUSCULAR; INTRAVENOUS at 14:07

## 2022-12-05 NOTE — NURSING NOTE
Order for one unit PRBC`s pending type and screen ordered by Dr. Cabello on the previous shift.  Patient has Hgb 7.4 and blood was administered.  Hgb increased to 8.7.  Patient tolerated blood administration well.  Patient continues to be unresponsive and moaning.  There is physical sitter in the room due to patient continues to attempt to pull out IV access and Dubhoff tube.  Continuing to monitor for the remainder of the shift.

## 2022-12-05 NOTE — CONSULTS
IR consulted for possible angiogram/embolization of spontaneous rectus sheath hematoma, which occurred while anticoagulated. Repeat CT yesterday showed no enlargement of hematoma. Hb slowly trending down, but patient is now in acute renal failure, and there is likely dilution from fluid retention. Pt currently hemodynamically stable, no hypotension.    Would not recommend angiogram/intervention yet, given stability of hematoma on CT. Additionally, contrast from angiogram will worsen KVNG. Recommend holding all blood thinners if possible. Pt receiving 1U PRBCs this morning.     IR will see as needed if there is sign of recurrent bleeding such as hypotension, enlargement of hematoma or abrupt drop in Hb.    Call with any questions.

## 2022-12-05 NOTE — ANESTHESIA PREPROCEDURE EVALUATION
Anesthesia Evaluation     Patient summary reviewed and Nursing notes reviewed   NPO Solid Status: > 8 hours  NPO Liquid Status: > 8 hours           Airway   Mallampati: II  TM distance: >3 FB  Neck ROM: full  No difficulty expected  Dental - normal exam     Pulmonary - normal exam   (+) pneumonia , a smoker Current Abstained day of surgery, shortness of breath,     ROS comment: CXR 12/03/2022:  Patchy left basilar consolidation suspicious for pneumonia.  Cardiovascular - normal exam    ECG reviewed    (+) hypertension,  carotid artery disease carotid bilateral      Neuro/Psych  (+) CVA residual symptoms, psychiatric history Depression,    GI/Hepatic/Renal/Endo    (+) obesity,       Musculoskeletal (-) negative ROS    Abdominal  - normal exam    Bowel sounds: normal.   Substance History - negative use     OB/GYN negative ob/gyn ROS         Other   blood dyscrasia anemia,     ROS/Med Hx Other: 72-year-old female admitted to the hospital 11/24/2022 with complaints of aphasia and CT scan showed remote infarct in left basal ganglia and a smaller 1 on the right side. During admission she was on Plavix and therapeutic Lovenox but she was found to have retrorectal hematoma and required transfusion of 1 unit of blood and holding her blood thinners                Anesthesia Plan    ASA 4     MAC   total IV anesthesia  intravenous induction     Anesthetic plan, risks, benefits, and alternatives have been provided, discussed and informed consent has been obtained with: patient.    Plan discussed with CRNA and CAA.        CODE STATUS:    Code Status (Patient has no pulse and is not breathing): CPR (Attempt to Resuscitate)  Medical Interventions (Patient has pulse or is breathing): Full Support  Release to patient: Routine Release

## 2022-12-05 NOTE — PROGRESS NOTES
Hemoglobin continues to slowly trend down.  We will transfuse 1 more unit given 7.4 hemoglobin this morning.

## 2022-12-05 NOTE — SIGNIFICANT NOTE
12/05/22 1331   Rehab Time/Intention   Session Not Performed unable to treat, medical status change  (pt with overall medical decline, retroperitoneal hematoma, not appropriate for PT this date.)   Recommendation   PT - Next Appointment 12/06/22

## 2022-12-05 NOTE — CONSULTS
Palliative Care Social Work Progress Note    Code Status:full code    Goals of Care: Full Treatment    Narrative: Palliative care  met with pt to assess for goals of care.  Pt unable to participate in visit, but pt's sister was at bedside.  Sister reports that I needed to speak with the pt's son and deferred to him on any goals of care issues.  I visited again later in the day, but he was still not there.  I called pt's son as well, but there was no answer.  Voicemail left requesting call back.    Plan:  -Will continue to follow.          CHENCHO Franco

## 2022-12-05 NOTE — PROGRESS NOTES
Nutrition Services    Patient Name: Cheyenne Estrella  YOB: 1950  MRN: [id number]  Admission date: 11/24/2022    PPE Documentation        PPE Worn By Provider Did not enter room for this encounter   PPE Worn By Patient  N/A     PROGRESS NOTE      Encounter Information: Received secure message from RN that MD had given clearance to re-start TF today via NG. PEG had previously been planned, but per review of documentation, pt has a L rectus sheath hematoma with hemorrhage extending into the left pelvis, making it impossible to move forward with PEG at this time. For this reason, TF will resume via NG for now.        PO Diet: NPO Diet NPO Type: Strict NPO   PO Supplements: None ordered   PO Intake:  NPO       Current nutrition support: Previously was receiving Isosource 1.5 @ 50mL/hour with 50mL/hour water flush    Nutrition support review: Has not been infusing - previous orders were D/C by surgeon on 12/3; TF to resume today per GI       Labs (reviewed below): Hypernatremia - will resume feedings, which should help this to begin to trend downward; IV fluids also are infusing   Mild hyperphosphatemia - possibly R/t hydration status; will monitor         GI Function:  Stool Output  Stool Unmeasured Occurrence: 1 (12/04/22 1300)  Bowel Incontinence: Yes (12/03/22 0851)  Stool Amount: smear (12/03/22 1538)          Nutrition Intervention Updates: Resume TF, providing Isosource 1.5 @ 50mL/hour + 50mL/hour water flush.       Results from last 7 days   Lab Units 12/05/22  0110 12/04/22  0605 12/03/22  0056   SODIUM mmol/L 149* 146* 149*   POTASSIUM mmol/L 3.9 4.6 4.2   CHLORIDE mmol/L 113* 109* 108*   CO2 mmol/L 22.0 21.0* 28.0   BUN mg/dL 85* 81* 48*   CREATININE mg/dL 2.28* 3.31* 1.73*   CALCIUM mg/dL 8.5* 8.3* 9.3   BILIRUBIN mg/dL 0.3 0.4 0.5   ALK PHOS U/L 111 114 135*   ALT (SGPT) U/L 49* 53* 92*   AST (SGOT) U/L 30 25 41*   GLUCOSE mg/dL 130* 150* 224*     Results from last 7 days   Lab Units  12/05/22  0110 12/04/22  1159 12/04/22  0605 12/03/22  0336 12/03/22  0056   MAGNESIUM mg/dL 2.6*  --  2.4  --  2.3   PHOSPHORUS mg/dL 4.9*  --  5.5*  --  5.3*   HEMOGLOBIN g/dL 7.4*   < > 8.7*   < >  --    HEMATOCRIT % 22.7*   < > 27.6*   < >  --     < > = values in this interval not displayed.     COVID19   Date Value Ref Range Status   02/18/2022 Not Detected Not Detected - Ref. Range Final     Lab Results   Component Value Date    HGBA1C 5.4 11/25/2022     RD to follow up per protocol.    Electronically signed by:  Kellen Haile RD  12/05/22 12:48 EST

## 2022-12-05 NOTE — NURSING NOTE
Patient attempting to pull Dubhoff Tube from nare.  No physical sitter in the room this evening.  New order obtained for non-violent restraint for upper extremities to prevent harm to the patient.  Patient daughter  Kenia Gandhi notified and voices her approval.  Patient tolerates intervention without incident. Safety checks performed hourly as well as restraint is loosened for circulatory promotion.  Patient is resting at this time.  Nurse is continuing to monitor for the remainder of the shift.

## 2022-12-05 NOTE — PLAN OF CARE
Problem: Adult Inpatient Plan of Care  Goal: Plan of Care Review  12/5/2022 0320 by Jazmin Rae RN  Outcome: Ongoing, Progressing  12/5/2022 0320 by Jazmin Rae RN  Outcome: Ongoing, Progressing  Flowsheets (Taken 12/5/2022 0320)  Plan of Care Reviewed With: patient  Goal: Patient-Specific Goal (Individualized)  12/5/2022 0320 by Jazmin Rae RN  Outcome: Ongoing, Progressing  12/5/2022 0320 by Jazmin Rae RN  Outcome: Ongoing, Progressing  Goal: Absence of Hospital-Acquired Illness or Injury  12/5/2022 0320 by Jazmin Rae RN  Outcome: Ongoing, Progressing  12/5/2022 0320 by Jazmin Rae RN  Outcome: Ongoing, Progressing  Intervention: Identify and Manage Fall Risk  Description: Perform standard risk assessment on admission using a validated tool or comprehensive approach appropriate to the patient; reassess fall risk frequently, with change in status or transfer to another level of care.  Communicate fall injury risk to interprofessional healthcare team.  Determine need for increased observation, equipment and environmental modification, such as low bed, signage and supportive, nonskid footwear.  Adjust safety measures to individual developmental age, stage and identified risk factors.  Reinforce the importance of safety and physical activity with patient and family.  Perform regular intentional rounding to assess need for position change, pain assessment and personal needs, including assistance with toileting.  Recent Flowsheet Documentation  Taken 12/5/2022 0200 by Jazmin Rae RN  Safety Promotion/Fall Prevention: activity supervised  Taken 12/5/2022 0000 by Jazmin Rae RN  Safety Promotion/Fall Prevention:   safety round/check completed   room organization consistent   nonskid shoes/slippers when out of bed   lighting adjusted   fall prevention program maintained   clutter free environment maintained   assistive device/personal items within reach  Taken 12/4/2022  2200 by Jazmin Rae RN  Safety Promotion/Fall Prevention:   activity supervised   assistive device/personal items within reach   safety round/check completed   room organization consistent   nonskid shoes/slippers when out of bed   lighting adjusted   fall prevention program maintained   clutter free environment maintained  Taken 12/4/2022 1934 by Jazmin Rae RN  Safety Promotion/Fall Prevention: activity supervised  Intervention: Prevent Skin Injury  Description: Perform a screening for skin injury risk, such as pressure or moisture associated skin damage on admission and at regular intervals throughout hospital stay.  Keep all areas of skin (especially folds) clean and dry.  Maintain adequate skin hydration.  Relieve and redistribute pressure and protect bony prominences; implement measures based on patient-specific risk factors.  Match turning and repositioning schedule to clinical condition.  Encourage weight shift frequently; assist with reposition if unable to complete independently.  Float heels off bed; avoid pressure on the Achilles tendon.  Keep skin free from extended contact with medical devices.  Encourage functional activity and mobility, as early as tolerated.  Use aids (e.g., slide boards, mechanical lift) during transfer.  Recent Flowsheet Documentation  Taken 12/5/2022 0000 by Jazmin Rae RN  Body Position: weight shifting  Taken 12/4/2022 1934 by Jazmin Rae RN  Body Position: turned  Skin Protection: adhesive use limited  Intervention: Prevent and Manage VTE (Venous Thromboembolism) Risk  Description: Assess for VTE (venous thromboembolism) risk.  Encourage and assist with early ambulation.  Initiate and maintain compression or other therapy, as indicated, based on identified risk in accordance with organizational protocol and provider order.  Encourage both active and passive leg exercises while in bed, if unable to ambulate.  Recent Flowsheet Documentation  Taken  12/5/2022 0000 by Jazmin Rae RN  Activity Management: activity adjusted per tolerance  VTE Prevention/Management: sequential compression devices on  Range of Motion: active ROM (range of motion) encouraged  Taken 12/4/2022 1934 by Jazmin Rae RN  Activity Management: activity adjusted per tolerance  VTE Prevention/Management: sequential compression devices on  Intervention: Prevent Infection  Description: Maintain skin and mucous membrane integrity; promote hand, oral and pulmonary hygiene.  Optimize fluid balance, nutrition, sleep and glycemic control to maximize infection resistance.  Identify potential sources of infection early to prevent or mitigate progression of infection (e.g., wound, lines, devices).  Evaluate ongoing need for invasive devices; remove promptly when no longer indicated.  Recent Flowsheet Documentation  Taken 12/5/2022 0200 by Jazmin Rae RN  Infection Prevention:   single patient room provided   rest/sleep promoted   hand hygiene promoted   environmental surveillance performed  Taken 12/5/2022 0000 by Jazmin Rae RN  Infection Prevention:   environmental surveillance performed   hand hygiene promoted   rest/sleep promoted   single patient room provided  Taken 12/4/2022 2200 by Jazmin Rae RN  Infection Prevention:   single patient room provided   rest/sleep promoted   hand hygiene promoted   environmental surveillance performed  Taken 12/4/2022 1934 by Jazmin Rae RN  Infection Prevention:   hand hygiene promoted   environmental surveillance performed   single patient room provided   rest/sleep promoted  Goal: Optimal Comfort and Wellbeing  12/5/2022 0320 by Jazmin Rae RN  Outcome: Ongoing, Progressing  12/5/2022 0320 by Jazmin Rae RN  Outcome: Ongoing, Progressing  Intervention: Monitor Pain and Promote Comfort  Description: Assess pain level, treatment efficacy and patient response at regular intervals using a consistent pain  scale.  Consider the presence and impact of preexisting chronic pain.  Encourage patient and caregiver involvement in pain assessment, interventions and safety measures.  Recent Flowsheet Documentation  Taken 12/5/2022 0000 by Jazmin Rae RN  Pain Management Interventions:   see MAR   quiet environment facilitated   care clustered  Taken 12/4/2022 1934 by Jazmin Rae RN  Pain Management Interventions:   see MAR   quiet environment facilitated   care clustered  Goal: Readiness for Transition of Care  12/5/2022 0320 by Jazimn Rae RN  Outcome: Ongoing, Progressing  12/5/2022 0320 by Jazmin Rae RN  Outcome: Ongoing, Progressing     Problem: Adjustment to Illness (Stroke, Ischemic/Transient Ischemic Attack)  Goal: Optimal Coping  12/5/2022 0320 by Jazmin Rae RN  Outcome: Ongoing, Progressing  12/5/2022 0320 by Jazmin Rae RN  Outcome: Ongoing, Progressing  Intervention: Support Psychosocial Response to Stroke  Description: Acknowledge, normalize, validate intensity and complexity of response to the sudden impact of stroke, resulting impairments and uncertainty in recovery.  Encourage verbalization of feelings regarding current situation; provide active and empathic listening; be sensitive to nonverbal communication.  Engage in early, proactive and ongoing discussion about goals of care and what matters most to them; facilitate shared decision-making.  Assess and monitor perspective on quality of life, personal and family wellbeing; consider palliative care approach to enhance symptom relief and quality of life.  Empower patient and support system to ask questions and be actively involved in decision-making.  Acknowledge and validate significance of lifestyle changes and expectations (e.g., roles and identity, rehabilitation, medication regimen, diet, exercise).  Recognize current coping strategies and assist in developing new strategies (e.g., mindfulness, relaxation,  optimism).  Assess and monitor for signs and symptoms of poststroke depression and anxiety; consider brief psychologic intervention, such as motivational interviewing or problem-solving. Refer for comprehensive evaluation and treatment if symptoms persist.  Recent Flowsheet Documentation  Taken 12/5/2022 0000 by Jazmin Rae RN  Supportive Measures: active listening utilized  Taken 12/4/2022 1934 by Jazmin Rae RN  Supportive Measures: active listening utilized  Family/Support System Care:   self-care encouraged   support provided     Problem: Bowel Elimination Impaired (Stroke, Ischemic/Transient Ischemic Attack)  Goal: Effective Bowel Elimination  12/5/2022 0320 by Jazmin Rae RN  Outcome: Ongoing, Progressing  12/5/2022 0320 by Jazmin Rae RN  Outcome: Ongoing, Progressing     Problem: Cerebral Tissue Perfusion (Stroke, Ischemic/Transient Ischemic Attack)  Goal: Optimal Cerebral Tissue Perfusion  12/5/2022 0320 by Jazmin Rae RN  Outcome: Ongoing, Progressing  12/5/2022 0320 by Jazmin Rae RN  Outcome: Ongoing, Progressing  Intervention: Protect and Optimize Cerebral Perfusion  Description: Closely monitor neurologic status to prevent or minimize further neurologic damage.  Implement hemodynamic, neurologic and cardiac monitoring to guide care, based on individual targeted parameters.  Reassess blood pressure frequently; manage hypertension or hypotension to attain recommended goal based on stroke treatment.  Anticipate fluid and pharmacologic therapy (e.g., antiplatelet, osmotherapy, diuretic, analgesic) to improve cerebral perfusion and intracranial pressure goals; titrate to target threshold and patient response; avoid fluid overload.  Manage fever; maintain normothermia to preserve cerebral metabolism.  Position head of bed to maintain cerebral perfusion; place head in midline position, as tolerated, to enhance venous flow.  Minimize activity that increases intrathoracic or  intra-abdominal pressure resulting in decreased venous outflow (e.g., hip flexion, Valsalva maneuver, coughing, vomiting).  Avoid hypoglycemia; maintain glycemic control.  Minimize stimulation; provide a quiet and calm environment.  Provide seizure surveillance; if seizure occurs, maintain safety, provide ordered therapy and postseizure care.  Anticipate procedure or surgery to restore blood flow and decrease cerebral pressure, such as intravenous or intra-arterial thromboembolitic, mechanical embolectomy, craniectomy, hemicraniectomy, carotid endarterectomy, angioplasty, stenting or ventr  Maintain external ventricular drain, when present (e.g., zeroed, closed/intermittent drainage, open/continuous drainage); monitor cerebrospinal fluid characteristics.  Recent Flowsheet Documentation  Taken 12/5/2022 0200 by Jazmin Rae RN  Stabilization Measures: legs elevated  Taken 12/5/2022 0000 by Jazmin Rae RN  Stabilization Measures: legs elevated  Fluid/Electrolyte Management: fluids restricted  Sensory Stimulation Regulation:   care clustered   lighting decreased  Cerebral Perfusion Promotion: blood pressure monitored  Taken 12/4/2022 2200 by Jazmin Rae RN  Stabilization Measures: legs elevated  Taken 12/4/2022 1934 by Jazmin Rae RN  Stabilization Measures: legs elevated  Fluid/Electrolyte Management: fluids restricted  Sensory Stimulation Regulation:   care clustered   lighting decreased  Cerebral Perfusion Promotion: blood pressure monitored     Problem: Cognitive Impairment (Stroke, Ischemic/Transient Ischemic Attack)  Goal: Optimal Cognitive Function  12/5/2022 0320 by Jazmin Rae RN  Outcome: Ongoing, Progressing  12/5/2022 0320 by Jazmin Rae RN  Outcome: Ongoing, Progressing  Intervention: Optimize Cognitive Function  Description: Assess cognitive abilities, such as attention, memory, problem-solving or reasoning, direction-following and executive function.  Provide  supervision, assistance and instructions that match the patient's ability. Simplify directions, providing demonstrations and cues as needed.  Facilitate safe surroundings; keep needed items within reach, such as call light and personal belongings.  Establish familiarity using a structured routine congruent with home schedule when possible, such as consistent caregiver, sleep and meal schedule, as well as toileting schedule.  Promote use of personal vision and auditory aids.  Provide familiar items and orientation cues, such as calendar, clock or pictures.  Consider enrichment of the environment that supports functional activity, mobility and interactions with others.  Implement compensatory strategy training, such as use of smartphone apps to compensate for memory impairment.  Implement cognitive remediation or restorative techniques, such as training in direct attention, virtual reality, occupation or activity-based interventions and functional communication strategies.  Recent Flowsheet Documentation  Taken 12/5/2022 0000 by Jazmin Rae RN  Sensory Stimulation Regulation:   care clustered   lighting decreased  Reorientation Measures: clock in view  Environment Familiarity/Consistency: daily routine followed  Taken 12/4/2022 1934 by Jazmin Rae RN  Sensory Stimulation Regulation:   care clustered   lighting decreased  Reorientation Measures: clock in view  Environment Familiarity/Consistency: daily routine followed     Problem: Communication Impairment (Stroke, Ischemic/Transient Ischemic Attack)  Goal: Improved Communication Skills  12/5/2022 0320 by Jazmin Rae RN  Outcome: Ongoing, Progressing  12/5/2022 0320 by Jazmin Rae, JOHNNA  Outcome: Ongoing, Progressing  Intervention: Optimize Communication Skills  Description: Evaluate communication skills and provide interventions to improve auditory comprehension, verbal expression, motor speech, pragmatic language, reading and writing  skills.  Establish basic communication method related to patient’s care, needs and preferences, such as an established yes-no reliability, use of symbols or communication board.  Make hearing aids, glasses and dentures available.  Utilize clear communication with simple verbal and gestured commands; provide adequate time for response. Provide choices when needed.  Facilitate family member/conversation partner understanding of aphasia or other communication deficits and strategies to increase communication.  Provide a calm environment without excessive noise or distraction; avoid situations overly frustrating for the patient.  Determine optimal means of communication; support multimodal methods and ensure consistent use.  Consider augmentative and alternative communication devices, technology-based interventions and constraint-induced language therapy.  Recent Flowsheet Documentation  Taken 12/5/2022 0000 by Jazmin Rae RN  Communication Enhancement Strategies: call light answered in person  Taken 12/4/2022 1934 by Jazmin Rae RN  Communication Enhancement Strategies: call light answered in person     Problem: Functional Ability Impaired (Stroke, Ischemic/Transient Ischemic Attack)  Goal: Optimal Functional Ability  12/5/2022 0320 by Jazmin Rae RN  Outcome: Ongoing, Progressing  12/5/2022 0320 by Jazmin Rae RN  Outcome: Ongoing, Progressing  Intervention: Optimize Functional Ability  Description: Evaluate and retrain in functional mobility and ADLs (activities of daily living) based on ability and tolerance; provide level of assistance and supervision needed for safety.  Initiate sitting and standing when able; evaluate and address balance, postural control and fall risk.  Instruct in mobility and ADL (activities of daily living) techniques with task-specific training individualized to patient needs and discharge disposition; promote highest level of independence and safety.  Patricia  coordinate and organize care schedule and activity per patient preference, priorities and tolerance; train in energy-conservation techniques.  Provide repetitive, mobility-task training for gait disturbances; consider ankle foot orthosis or functional electrical stimulation if foot drop is present.  Consider multimodal therapeutic interventions, such as virtual reality, graded motor imagery/mirror therapy, robotic or treadmill training and FES (functional electrical stimulation).  Promote use of recommended adaptive equipment, assistive devices or orthoses, such as a cane or walker.  Maintain patient’s preferred routines and habits; respect privacy and personal space.  Provide a safe, barrier-free, uncluttered environment that promotes optimal level of function.  Recent Flowsheet Documentation  Taken 12/5/2022 0000 by Jazmin Rae RN  Activity Management: activity adjusted per tolerance  Self-Care Promotion: BADL personal objects within reach  Taken 12/4/2022 1934 by Jazmin Rae RN  Activity Management: activity adjusted per tolerance  Self-Care Promotion: BADL personal objects within reach     Problem: Respiratory Compromise (Stroke, Ischemic/Transient Ischemic Attack)  Goal: Effective Oxygenation and Ventilation  12/5/2022 0320 by Jazmin Rae RN  Outcome: Ongoing, Progressing  12/5/2022 0320 by Jazmin Rae RN  Outcome: Ongoing, Progressing  Intervention: Optimize Oxygenation and Ventilation  Description: Assess and monitor airway, breathing and circulation; maintain close surveillance for deterioration.  Maintain normal PaCO2 (partial pressure of carbon dioxide) to minimize risk of increased intracranial pressure and cerebral ischemia.  Maintain patent airway; position to minimize risk of obstruction, aspiration and ventilation-perfusion mismatch.  Provide oxygen therapy judiciously to avoid hyperoxemia; adjust to achieve oxygenation goal.  Encourage pulmonary hygiene and lung expansion  therapy, such as deep breathing, suction and ambulation, to minimize respiratory complication risk.  Consider inspiratory muscle training to decrease the risk of pulmonary complications.  Recognize risk for obstructive sleep apnea; anticipate the need for continuous pulse oximetry and noninvasive positive pressure ventilation.  Anticipate the need for intubation and mechanical ventilation for airway protection and respiratory support.  Recent Flowsheet Documentation  Taken 12/5/2022 0000 by Jazmin Rae RN  Head of Bed (HOB) Positioning: HOB at 30 degrees  Airway/Ventilation Management: airway patency maintained  Taken 12/4/2022 1934 by Jazmin Rae RN  Head of Bed (HOB) Positioning: HOB at 30-45 degrees  Airway/Ventilation Management: airway patency maintained     Problem: Sensorimotor Impairment (Stroke, Ischemic/Transient Ischemic Attack)  Goal: Improved Sensorimotor Function  12/5/2022 0320 by Jazmin Rae RN  Outcome: Ongoing, Progressing  12/5/2022 0320 by Jazmin Rae RN  Outcome: Ongoing, Progressing  Intervention: Optimize Range of Motion, Motor Control and Function  Description: Evaluate passive and active range of motion, motor control, coordination and muscle tone.  Prevent and address shoulder pain and subluxation with interventions, such as careful positioning and handling techniques, ROM (range of motion) in protected ranges and support devices.  Implement multimodal therapeutic positioning and ROM (range of motion) interventions to minimize contracture risk and improve ROM (range of motion); ensure edema is managed.  Involve patient and parent/caregiver in treatment planning and implementation; consider both neurologic and developmental context in assessment and treatment.  Incorporate individualized, repetitive treatment activities that are meaningful and goal-directed.  Consider interventions to improve motor control and function, such as constraint-induced motor therapy or  modified version, robotics, virtual reality or FES (functional electrical stimulation).  If spasticity develops, quantify with a validated scale and implement interventions, such as positioning, range of motion and stretching.  Recent Flowsheet Documentation  Taken 12/5/2022 0000 by Jazmin Rae RN  Spasticity Management: positioned with supportive device  Positioning/Transfer Devices:   pillows   in use  Range of Motion: active ROM (range of motion) encouraged  Taken 12/4/2022 1934 by Jazmin Rae RN  Spasticity Management: positioned with supportive device  Positioning/Transfer Devices:   in use   pillows  Intervention: Optimize Sensory and Perceptual Ability  Description: Assess visual and perceptual status and implement interventions to address impairments.  Evaluate somatosensory status to determine type and extent of impairment; consider somatosensory retraining to improve sensory discrimination.  Provide interventions for hemispatial neglect, such as training in scanning of visual field.  Encourage use of vision to compensate for sensory loss, such as frequent visual scanning.  Identify and address any hearing or visual acuity impairments.  Recent Flowsheet Documentation  Taken 12/4/2022 1934 by Jazmin Rae RN  Pressure Reduction Techniques: frequent weight shift encouraged  Pressure Reduction Devices: positioning supports utilized     Problem: Swallowing Impairment (Stroke, Ischemic/Transient Ischemic Attack)  Goal: Optimal Eating and Swallowing without Aspiration  12/5/2022 0320 by Jazmin Rae RN  Outcome: Ongoing, Progressing  12/5/2022 0320 by Jazmin Rae RN  Outcome: Ongoing, Progressing  Intervention: Optimize Eating and Swallowing  Description: Withhold oral intake until swallowing ability has been determined using a valid screening tool; assess with clinical or instrumental examination for suspected dysphagia and aspiration to determine necessary treatment.  Provide diet  order recommendations for food texture and liquid consistency, while considering patient’s quality of life; may need to consider alternate means of nutrition.  Assess ability to effectively swallow medications; consider alternative routes, such as transdermal, intranasal or sublingual if oral route is not feasible.  Implement aspiration precautions; maintain oral hygiene to reduce risk of aspiration.  Assess need and provide assistance with meal set-up and feeding; encourage use of adaptive equipment if applicable.  Consider medication side effects that may impact swallowing function, such as dry mouth and decreased alertness.  Minimize conversation while eating to encourage focus; reduce distractions.  Manage swallowing impairment; utilize compensatory techniques, such as positioning adjustments (e.g., elevated head of bed, chin tuck maneuvers), and swallowing strategies (e.g., effortful swallow, multiple swallows).  Implement oral sensory motor techniques and exercises to address weakness or incoordination of underlying musculature or range of motion deficits.  Anticipate the need for pharmacologic therapy, such as antireflux or salivary management.  Recent Flowsheet Documentation  Taken 12/5/2022 0200 by Jazmin Rae RN  Aspiration Precautions: awake/alert before oral intake  Taken 12/5/2022 0000 by Jazmin Rae RN  Aspiration Precautions: awake/alert before oral intake  Feeding/Eating Techniques: close supervision provided  Taken 12/4/2022 2200 by Jazmin Rae RN  Aspiration Precautions: awake/alert before oral intake  Taken 12/4/2022 1934 by Jazmin Rae RN  Aspiration Precautions: awake/alert before oral intake  Feeding/Eating Techniques: close supervision provided     Problem: Urinary Elimination Impaired (Stroke, Ischemic/Transient Ischemic Attack)  Goal: Effective Urinary Elimination  12/5/2022 0320 by Jazmin Rae RN  Outcome: Ongoing, Progressing  12/5/2022 0320 by Butch  JOHNNA Wu  Outcome: Ongoing, Progressing  Intervention: Promote Effective Bladder Elimination  Description: Develop and maintain bladder regimen appropriate to neurologic function, including regular voiding schedule and routine.  Encourage oral intake, when able. If not able to meet fluid requirements, continue with intravenous therapy to achieve fluid balance.  Avoid indwelling catheter use; discontinue as soon as possible, if present.  Provide safe and ready access to toileting devices and aids (e.g., commode, urinal).  Ensure bladder emptying (e.g., intermittent catheterization, portable bladder ultrasound after voiding).  Promote behavioral methods to enhance voiding ability [e.g., reflexive voiding (males), relaxation techniques, a regular elimination schedule, time for bladder emptying, position to optimize flow].  Recent Flowsheet Documentation  Taken 12/5/2022 0000 by Jazmin Rae RN  Urinary Elimination Promotion:   absorbent pad/diaper use encouraged   catheter patency maintained  Taken 12/4/2022 1934 by Jazmin Rae RN  Urinary Elimination Promotion:   absorbent pad/diaper use encouraged   catheter patency maintained     Problem: Asthma Comorbidity  Goal: Maintenance of Asthma Control  12/5/2022 0320 by Jazmin Rae RN  Outcome: Ongoing, Progressing  12/5/2022 0320 by Jazmin Rae RN  Outcome: Ongoing, Progressing  Intervention: Maintain Asthma Symptom Control  Description: Evaluate adherence to self-management (asthma action plan), such as medication, symptom-control, trigger-avoidance and self-monitoring.  Advocate for continuation of home regimen, including medication, method of delivery, schedule and symptom monitoring; acknowledge preferred modality and routine.  Minimize exposure to potential triggers, such as perfume, cleaning chemicals and all types of smoke.  Assess for proper use of inhaled medication and delivery technique; assist or reinstruct if needed.  Evaluate  effectiveness of coping skills; encourage expression of feelings, expectations and concerns related to disease management and quality of life; reinforce education to enhance management plan and wellbeing.  Recent Flowsheet Documentation  Taken 12/5/2022 0200 by Jazmin Rae RN  Medication Review/Management: medications reviewed  Taken 12/5/2022 0000 by Jazmin Rae RN  Medication Review/Management: medications reviewed  Taken 12/4/2022 2200 by Jazmin Rae RN  Medication Review/Management: medications reviewed  Taken 12/4/2022 1934 by aJzmin Rae RN  Medication Review/Management: medications reviewed     Problem: Behavioral Health Comorbidity  Goal: Maintenance of Behavioral Health Symptom Control  12/5/2022 0320 by Jazmin Rae RN  Outcome: Ongoing, Progressing  12/5/2022 0320 by Jazmin Rae RN  Outcome: Ongoing, Progressing  Intervention: Maintain Behavioral Health Symptom Control  Description: Confirm mental health diagnosis and current treatment.  Evaluate adherence to previously identified self-management plan.  Advocate continuation of home strategies, including medication.  Evaluate effectiveness of self-management strategies and coping skills.  Communicate with providers to ensure continuity and follow-up at transition.  Recent Flowsheet Documentation  Taken 12/5/2022 0200 by Jazmin Rae RN  Medication Review/Management: medications reviewed  Taken 12/5/2022 0000 by Jazmin Rae RN  Medication Review/Management: medications reviewed  Taken 12/4/2022 2200 by Jazmin Rae RN  Medication Review/Management: medications reviewed  Taken 12/4/2022 1934 by Jazmin Rae RN  Medication Review/Management: medications reviewed     Problem: COPD (Chronic Obstructive Pulmonary Disease) Comorbidity  Goal: Maintenance of COPD Symptom Control  12/5/2022 0320 by Jazmin Rae RN  Outcome: Ongoing, Progressing  12/5/2022 0320 by Jazmin Rae RN  Outcome: Ongoing,  Progressing  Intervention: Maintain COPD-Symptom Control  Description: Evaluate adherence to management plan (e.g., medication, trigger avoidance, infection prevention, self-monitoring).  Advocate for continuation of home regimen, including medication, method of delivery, schedule and symptom monitoring.  Anticipate the need for breathing techniques and activity pacing to minimize fatigue and breathlessness.  Assess for proper use of inhaled medication and delivery technique; assist or reinstruct if needed.  Evaluate effectiveness of coping skills; encourage expression of feelings, expectations and concerns related to disease management and quality of life; reinforce education to enhance management plan and wellbeing.  Recent Flowsheet Documentation  Taken 12/5/2022 0200 by Jazmin Rae RN  Medication Review/Management: medications reviewed  Taken 12/5/2022 0000 by Jazmin Rae RN  Supportive Measures: active listening utilized  Medication Review/Management: medications reviewed  Taken 12/4/2022 2200 by Jazmin Rae RN  Medication Review/Management: medications reviewed  Taken 12/4/2022 1934 by Jazmin Rae RN  Supportive Measures: active listening utilized  Medication Review/Management: medications reviewed     Problem: Diabetes Comorbidity  Goal: Blood Glucose Level Within Targeted Range  12/5/2022 0320 by Jazmin Rae RN  Outcome: Ongoing, Progressing  12/5/2022 0320 by Jazmin Rae RN  Outcome: Ongoing, Progressing     Problem: Heart Failure Comorbidity  Goal: Maintenance of Heart Failure Symptom Control  12/5/2022 0320 by Jazmin Rae RN  Outcome: Ongoing, Progressing  12/5/2022 0320 by Jazmin Rae RN  Outcome: Ongoing, Progressing  Intervention: Maintain Heart Failure-Management  Description: Evaluate adherence to home heart failure self-care regimen (e.g., medication, fluid balance, sodium intake, daily weight, physical activity, telemonitoring, support).  Advocate  continuation of home medication and schedule.  Consider pharmacologic therapy administration time and effects (e.g., avoid giving diuretic prior to bedtime or nitrates on empty stomach).  Monitor response to pharmacologic therapy, including weight fluctuations, blood pressure and electrolyte levels.  Monitor for signs and symptoms of anxiety and depression, including severity and duration; if present, provide psychosocial support.  Consider need for heart failure clinic or palliative care consult.  Recent Flowsheet Documentation  Taken 12/5/2022 0200 by Jazmin Rae RN  Medication Review/Management: medications reviewed  Taken 12/5/2022 0000 by Jazmin Rae RN  Medication Review/Management: medications reviewed  Taken 12/4/2022 2200 by Jazmin Rae RN  Medication Review/Management: medications reviewed  Taken 12/4/2022 1934 by Jazmin Rae RN  Medication Review/Management: medications reviewed     Problem: Hypertension Comorbidity  Goal: Blood Pressure in Desired Range  12/5/2022 0320 by Jazmin Rae RN  Outcome: Ongoing, Progressing  12/5/2022 0320 by Jazmin Rae RN  Outcome: Ongoing, Progressing  Intervention: Maintain Blood Pressure Management  Description: Evaluate adherence to home antihypertensive regimen (e.g., exercise and activity, diet modification, medication).  Provide scheduled antihypertensive medication; consider administration time and effects (e.g., avoid giving diuretic prior to bedtime).  Monitor response to antihypertensive medication therapy (e.g., blood pressure, electrolyte levels, medication effects).  Minimize risk of orthostatic hypotension; encourage caution with position changes, particularly if elderly.  Recent Flowsheet Documentation  Taken 12/5/2022 0200 by Jazmin Rae RN  Medication Review/Management: medications reviewed  Taken 12/5/2022 0000 by Jazmin Rae RN  Syncope Management: position changed slowly  Medication Review/Management:  medications reviewed  Taken 12/4/2022 2200 by Jazmin Rae RN  Medication Review/Management: medications reviewed  Taken 12/4/2022 1934 by Jazmin Rae RN  Syncope Management: position changed slowly  Medication Review/Management: medications reviewed     Problem: Obstructive Sleep Apnea Risk or Actual Comorbidity Management  Goal: Unobstructed Breathing During Sleep  12/5/2022 0320 by Jazmin Rae RN  Outcome: Ongoing, Progressing  12/5/2022 0320 by Jazmin Rae RN  Outcome: Ongoing, Progressing  Intervention: Monitor and Manage Obstructive Sleep Apnea  Description: Use a validated screening tool to identify risk for DILAN (obstructive sleep apnea).  Implement continuous pulse oximetry to detect apnea-related oxygen desaturation events.  Encourage a nonsupine sleep position to prevent airway collapse (e.g., side-lying, head of bed elevated).  Minimize use of sedative, opioid and benzodiazepine medication that may contribute to respiratory depression.  Provide oxygen therapy during sleep to reduce hypoxemia.  Continue use of home prescribed continuous positive airway pressure during sleep; utilize home device and settings if available.  Implement continuous positive airway pressure during sleep for high-risk obstructive sleep apnea patients or patients not compliant with home therapy (auto-titrating may improve tolerance).  Facilitate referral following discharge if diagnosis has not been confirmed by polysomnograph.  Recent Flowsheet Documentation  Taken 12/5/2022 0000 by Jazmin Rae RN  NPPV/CPAP Maintenance: nasal pillows secure  Taken 12/4/2022 1934 by Jazmin Rae RN  NPPV/CPAP Maintenance: nasal prongs secure     Problem: Osteoarthritis Comorbidity  Goal: Maintenance of Osteoarthritis Symptom Control  12/5/2022 0320 by Jazmin Rae RN  Outcome: Ongoing, Progressing  12/5/2022 0320 by Jazmin Rae RN  Outcome: Ongoing, Progressing  Intervention: Maintain Osteoarthritis  Symptom Control  Description: Evaluate adherence to self-management plan, such as medication, exercise and weight management.  Advocate for continuation of home regimen, such as medication, physical activity and thermal agents; monitor response.  Encourage participation in functional activities, such as mobility and ADLs (activities of daily living) to minimize decline associated with inactivity.  Facilitate use of patient-specific assistive devices, equipment or orthoses.  Evaluate effectiveness of coping skills; encourage expression of feelings, expectations and concerns related to disease management and quality of life; reinforce education to enhance management plan and wellbeing.  Recent Flowsheet Documentation  Taken 12/5/2022 0200 by Jazmin Rae RN  Medication Review/Management: medications reviewed  Taken 12/5/2022 0000 by Jazmin Rae RN  Activity Management: activity adjusted per tolerance  Medication Review/Management: medications reviewed  Taken 12/4/2022 2200 by Jazmin Rae RN  Medication Review/Management: medications reviewed  Taken 12/4/2022 1934 by Jazmin Rae RN  Activity Management: activity adjusted per tolerance  Medication Review/Management: medications reviewed     Problem: Pain Chronic (Persistent) (Comorbidity Management)  Goal: Acceptable Pain Control and Functional Ability  12/5/2022 0320 by Jazmin Rae RN  Outcome: Ongoing, Progressing  12/5/2022 0320 by Jazmin Rae RN  Outcome: Ongoing, Progressing  Intervention: Manage Persistent Pain  Description: Evaluate pain level, effect of treatment and patient response at regular intervals.  Minimize pain stimuli; coordinate care and adjust environment (e.g., light, noise, unnecessary movement); promote sleep/rest.  Match pharmacologic analgesia to severity and type of pain mechanism (e.g., neuropathic, muscle, inflammatory); consider multimodal approach (e.g., nonopioid, opioid, adjuvant).  Provide medication at  regular intervals; titrate to patient response.  Manage breakthrough pain with additional doses; consider rotation or switching medication.  Monitor for signs of substance tolerance (increased dose to reach desired effect, decreased effect with same dose).  Avoid abrupt withdrawal of medication, especially agents capable of causing physical dependence.  Manage medication-induced effects, such as constipation, nausea, pruritus, urinary retention, somnolence and dizziness.  Provide multimodal treatment interventions, such as physical activity, therapeutic exercise, yoga, TENS (transcutaneous electrical nerve stimulation) and manual therapy.  Train in functional activity modifications, such as body mechanics, posture, ergonomics, energy conservation and activity pacing.  Consider addition of complementary or alternative therapy, such as acupuncture, hypnosis or therapeutic touch.  Recent Flowsheet Documentation  Taken 12/5/2022 0200 by Jazmin Rae RN  Medication Review/Management: medications reviewed  Taken 12/5/2022 0000 by Jazmin Rae RN  Sleep/Rest Enhancement:   awakenings minimized   room darkened   relaxation techniques promoted   reading promoted   natural light exposure provided   family presence promoted  Medication Review/Management: medications reviewed  Taken 12/4/2022 2200 by Jazmin Rae RN  Medication Review/Management: medications reviewed  Taken 12/4/2022 1934 by Jazmin Rae RN  Sleep/Rest Enhancement: awakenings minimized  Medication Review/Management: medications reviewed  Intervention: Develop Pain Management Plan  Description: Acknowledge patient as the expert in pain self-management.  Use a consistent, validated tool for pain assessment; include function and quality of life.  Evaluate risk for opioid use and dependence.  Set pain management goals; determine acceptable level of discomfort to allow for maximal functioning and quality of life.  Determine [id number]  pain management plan, including both pharmacologic and nonpharmacologic measures.  Identify and integrate past successful treatment measures, if able.  Encourage patient and caregiver involvement in pain assessment, interventions and safety measures.  Re-evaluate plan regularly.  Recent Flowsheet Documentation  Taken 12/5/2022 0000 by Jazmin Rae RN  Pain Management Interventions:   see MAR   quiet environment facilitated   care clustered  Taken 12/4/2022 1934 by Jazmin Rae RN  Pain Management Interventions:   see MAR   quiet environment facilitated   care clustered  Intervention: Optimize Psychosocial Wellbeing  Description: Facilitate patient’s self-control over pain by providing pain information and allowing choices in treatment.  Consider and address emotional response to pain.  Explore and promote use of coping strategies; address barriers to successful coping.  Evaluate and assist with psychosocial, cultural and spiritual factors impacting pain.  Modify pain perception by using techniques, such as distraction, mindfulness, guided imagery, meditation or music.  Assess and monitor for signs and symptoms of behavioral health concerns, such as unhealthy substance use, depression and suicidal ideation.  Consider referral for ongoing coping support, such as cognitive behavioral therapy and mindfulness-based stress reduction.  Recent Flowsheet Documentation  Taken 12/5/2022 0200 by Jazmin Rae RN  Diversional Activities: television  Taken 12/5/2022 0000 by Jazmin Rae RN  Supportive Measures: active listening utilized  Diversional Activities: television  Taken 12/4/2022 2156 by Jazmin Rae RN  Diversional Activities: television  Taken 12/4/2022 2000 by Jazmin Rae RN  Diversional Activities: television  Taken 12/4/2022 1934 by Jazmin Rae RN  Supportive Measures: active listening utilized  Diversional Activities: television  Family/Support System Care:   self-care  encouraged   support provided     Problem: Seizure Disorder Comorbidity  Goal: Maintenance of Seizure Control  12/5/2022 0320 by Jazmin Rae RN  Outcome: Ongoing, Progressing  12/5/2022 0320 by Jazmin Rae RN  Outcome: Ongoing, Progressing  Intervention: Maintain Seizure-Symptom Control  Description: Identify risks that may lower seizure threshold (e.g., hypoglycemia, illness, change in medications); assist in treatment.  Evaluate adherence to management plan (e.g., medication, symptom-control, trigger-avoidance, self-monitoring).  Advocate for continuation of home regimen, including medication, medication administration schedule, diet, sleep schedule and symptom monitoring; acknowledge home management and routine.  Minimize potential seizure triggers, such as stress, sleep deprivation and diet modifications.  Evaluate effectiveness of coping skills; encourage expression of feelings, expectations and concerns related to disease management and quality of life; reinforce education to enhance management plan and wellbeing.  Maintain seizure precautions, such as removal of potential harmful objects, padded side rails, bed in low position, suction and airway protection equipment readily available.  Provide a quiet, calm environment.  If seizure occurs, stay at bedside; monitor and record details of seizure activity.  Maintain patent airway; do not insert anything into the mouth (unless the airway becomes compromised).  Place head of bed flat and turn to side-lying position to prevent aspiration and promote safety; do not restrain.  Anticipate administration of pharmacologic therapy, such as anticonvulsant.  Assess for injury following seizure; monitor, reassure and reorient patient.  Recent Flowsheet Documentation  Taken 12/5/2022 0200 by Jazmin Rae, RN  Seizure Precautions: activity supervised  Taken 12/5/2022 0000 by Jazmin Rae, RN  Seizure Precautions: activity supervised  Taken 12/4/2022 2200  by Butch, Jazmin, RN  Seizure Precautions: activity supervised  Taken 12/4/2022 1934 by Jazmin Rae RN  Seizure Precautions: activity supervised     Problem: Skin Injury Risk Increased  Goal: Skin Health and Integrity  12/5/2022 0320 by Jazmin Rae RN  Outcome: Ongoing, Progressing  12/5/2022 0320 by Jazmin Rae RN  Outcome: Ongoing, Progressing  Intervention: Promote and Optimize Oral Intake  Description: Perform a nutrition assessment; include a nutrition-focused physical exam.  Determine calorie, protein, vitamin, mineral and fluid requirements.  Assess for micronutrient deficiencies; supplement if depleted.  Assess need and assist with meal set-up and feeding.  Adjust diet or meal schedule based on preferences and tolerance.  Offer oral supplemental food or drinks to enhance calorie and protein intake.  Establish bowel elimination program to increase comfort and appetite.  Minimize unnecessary dietary restrictions to increase oral intake.  Provide and encourage oral hygiene to enhance desire to eat.  Consider enteral nutrition support if oral intake remains inadequate; provide parenteral nutrition if enteral is contraindicated.  Recent Flowsheet Documentation  Taken 12/5/2022 0000 by Jazmin Rae RN  Oral Nutrition Promotion: medicated  Taken 12/4/2022 1934 by Jazmin Rae RN  Oral Nutrition Promotion: medicated  Intervention: Optimize Skin Protection  Description: Perform a full pressure injury risk assessment, as indicated by screening, upon admission to care unit.  Reassess skin (injury risk, full inspection) frequently (e.g., scheduled interval, with change in condition) to provide optimal early detection and prevention.  Maintain adequate tissue perfusion (e.g., encourage fluid balance; avoid crossing legs, constrictive clothing or devices) to promote tissue oxygenation.  Maintain head of bed at lowest degree of elevation tolerated, considering medical condition and other  restrictions.  Avoid positioning onto an area that remains reddened.  Minimize incontinence and moisture (e.g., toileting schedule; moisture-wicking pad, diaper or incontinence collection device; skin moisture barrier).  Cleanse skin promptly and gently when soiled utilizing a pH-balanced cleanser.  Relieve and redistribute pressure (e.g., scheduled position changes, weight shifts, use of support surface, medical device repositioning, protective dressing application, use of positioning device, microclimate control, use of pressure-injury-monitor  Encourage increased activity, such as sitting in a chair at the bedside or early mobilization, when able to tolerate.  Recent Flowsheet Documentation  Taken 12/5/2022 0000 by Jazmin Rae RN  Head of Bed (HOB) Positioning: HOB at 30 degrees  Taken 12/4/2022 1934 by Jazmin Rae, RN  Pressure Reduction Techniques: frequent weight shift encouraged  Head of Bed (HOB) Positioning: HOB at 30-45 degrees  Pressure Reduction Devices: positioning supports utilized  Skin Protection: adhesive use limited     Problem: Fall Injury Risk  Goal: Absence of Fall and Fall-Related Injury  12/5/2022 0320 by Jazmin Rae RN  Outcome: Ongoing, Progressing  12/5/2022 0320 by Jazmin Rae RN  Outcome: Ongoing, Progressing  Intervention: Identify and Manage Contributors  Description: Develop a fall prevention plan with the patient and caregiver/family.  Provide reorientation, appropriate sensory stimulation and routines with changes in mental status to decrease risk of fall.  Promote use of personal vision and auditory aids.  Assess assistance level required for safe and effective self-care; provide support as needed, such as toileting, mobilization. For age 65 and older, implement timed toileting with assistance.  Encourage physical activity, such as performance of mobility and self-care at highest level of patient ability, multicomponent exercise program and provision of  appropriate assistive devices.  If fall occurs, assess the severity of injury; implement fall injury protocol. Determine the cause and revise fall injury prevention plan.  Regularly review medication contribution to fall risk; adjust medication administration times to minimize risk of falling.  Consider risk related to polypharmacy and age.  Balance adequate pain management with potential for oversedation.  Recent Flowsheet Documentation  Taken 12/5/2022 0200 by Jazmin Rae RN  Medication Review/Management: medications reviewed  Taken 12/5/2022 0000 by Jazmin Rae RN  Medication Review/Management: medications reviewed  Self-Care Promotion: BADL personal objects within reach  Taken 12/4/2022 2200 by Jazmin Rae RN  Medication Review/Management: medications reviewed  Taken 12/4/2022 1934 by Jazmin Rae RN  Medication Review/Management: medications reviewed  Self-Care Promotion: BADL personal objects within reach  Intervention: Promote Injury-Free Environment  Description: Provide a safe, barrier-free environment that encourages independent activity.  Keep care area uncluttered and well-lighted.  Determine need for increased observation or monitoring.  Avoid use of devices that minimize mobility, such as restraints or indwelling urinary catheter.  Recent Flowsheet Documentation  Taken 12/5/2022 0200 by Jazmin Rae RN  Safety Promotion/Fall Prevention: activity supervised  Taken 12/5/2022 0000 by Jazmin Rae RN  Safety Promotion/Fall Prevention:   safety round/check completed   room organization consistent   nonskid shoes/slippers when out of bed   lighting adjusted   fall prevention program maintained   clutter free environment maintained   assistive device/personal items within reach  Taken 12/4/2022 2200 by Jazmin Rae RN  Safety Promotion/Fall Prevention:   activity supervised   assistive device/personal items within reach   safety round/check completed   room organization  consistent   nonskid shoes/slippers when out of bed   lighting adjusted   fall prevention program maintained   clutter free environment maintained  Taken 12/4/2022 1934 by Jazmin Rae RN  Safety Promotion/Fall Prevention: activity supervised     Problem: Aspiration (Enteral Nutrition)  Goal: Absence of Aspiration Signs and Symptoms  12/5/2022 0320 by Jazmin aRe RN  Outcome: Ongoing, Progressing  12/5/2022 0320 by Jazmin Rae RN  Outcome: Ongoing, Progressing  Intervention: Minimize Aspiration Risk  Description: Elevate head of bed to a semi-recumbent position.  Provide routine oral care with antimicrobial solution to reduce risk of infection.  Promote continuous gastric feedings; advocate for postpyloric feeding if higher aspiration risk.  Confirm placement of gastric or gastroenteric access device prior to initiation of feedings and with adjustments.  Petr tube at exit site at time of initial x-ray; monitor exit site for tube migration.  Monitor for feeding intolerance (e.g., abdominal distension, gastric residual volume, nausea and vomiting).  Recent Flowsheet Documentation  Taken 12/5/2022 0000 by Jazmin Rae RN  Head of Bed (HOB) Positioning: HOB at 30 degrees  Oral Care: oral rinse provided  Enteral Feeding Safety: placement checked  Taken 12/4/2022 1934 by Jazmin Rae RN  Head of Bed (HOB) Positioning: HOB at 30-45 degrees  Enteral Feeding Safety: placement checked     Problem: Device-Related Complication Risk (Enteral Nutrition)  Goal: Safe, Effective Therapy Delivery  12/5/2022 0320 by Jazmin Rae RN  Outcome: Ongoing, Progressing  12/5/2022 0320 by Jazmin Rae RN  Outcome: Ongoing, Progressing  Intervention: Prevent Feeding-Related Adverse Events  Description: Utilize aseptic technique when initiating and handling enteral feeding system.  Avoid tubing misconnections by labelling and securing all tubing and connections; trace all tubing back to origin.  Monitor and  manage integrity of enteral access device (e.g., tension, pulling, connections, obstructions, leaking, cracks).  Stabilize and secure access device (e.g., abdominal binder, securement device).  Flush tube regularly (e.g., every 4 hours, before and after medication delivery, after intermittent feeding) to maintain tube patency; use purified water if immunocompromised or critically ill.  Review medications for drug-nutrient incompatibility and suitability of administration through enteral tube.  Manage tube occlusion (e.g., warm water flushes, enzymatic agent, mechanical dislodgement device).  Manage medication delivery (e.g., use clean enteral syringes, dilute powdered medication with water, use liquid dosage forms when available).  Recent Flowsheet Documentation  Taken 12/5/2022 0000 by Jazmin Rae RN  Enteral Feeding Safety: placement checked  Taken 12/4/2022 1934 by Jazmin Rae RN  Enteral Feeding Safety: placement checked     Problem: Feeding Intolerance (Enteral Nutrition)  Goal: Feeding Tolerance  12/5/2022 0320 by Jazmin Rae RN  Outcome: Ongoing, Progressing  12/5/2022 0320 by Jazmin Rae RN  Outcome: Ongoing, Progressing  Intervention: Prevent and Manage Feeding Intolerance  Description: Initiate early enteral nutrition support to maintain gut integrity and function; monitor for refeeding syndrome.  Monitor abdominal girth and distension; assess need for promotility agent if suspect reduced gastric emptying or delayed bowel motility.  Monitor stool pattern and characteristics; implement bowel regimen to aid in correction of constipation or diarrhea; consider the impact of medications.  Advocate for and adjust infusion rate, formulation or volume based on feeding tolerance and clinical status.  Minimize unnecessary disruptions to feeding (e.g., prolonged NPO status for procedures, frequent gastric residual checks); use enteral nutrition protocol to optimize delivery.  Recent Flowsheet  Documentation  Taken 12/5/2022 0000 by Jazmin Rae RN  Nutrition Support Management: tube feeding held  Taken 12/4/2022 1934 by Jazmin Rae RN  Nutrition Support Management: tube feeding held     Problem: Restraint, Nonbehavioral (Nonviolent)  Goal: Absence of Harm or Injury  12/5/2022 0320 by Jazmin Rae RN  Outcome: Ongoing, Progressing  12/5/2022 0320 by Jazmin Rae RN  Outcome: Ongoing, Progressing  Intervention: Implement Least Restrictive Safety Strategies  Description: Consider personal and environmental factors contributing to nonviolent or self-destructive behavior.  Utilize diversional activity/alternative device protection.  Document alternative strategies and less restrictive intervention attempts and their effects.  Clearly describe/record behavior leading to need for restraint.  Use the least restrictive method of restraint.  Recognize restraint should only be used if needed to improve the patient's wellbeing and less restrictive interventions have been determined to be ineffective.  Reassess continued need frequently. Remove restraint as soon as unsafe situation is resolved.  Recent Flowsheet Documentation  Taken 12/5/2022 0200 by Jazmin Rae RN  Medical Device Protection:   IV pole/bag removed from visual field   tubing secured   torso covered  Less Restrictive Alternative:   bed alarm in use   positive reinforcement provided   calming techniques promoted   therapeutic music   emotional support provided  De-Escalation Techniques: medication administered  Diversional Activities: television  Taken 12/5/2022 0000 by Jazmin Rae, RN  Medical Device Protection:   IV pole/bag removed from visual field   pajama bottoms to protect indwelling catheter   tubing secured  Less Restrictive Alternative:   bed alarm in use   positive reinforcement provided   calming techniques promoted   therapeutic music   emotional support provided  De-Escalation Techniques: medication  administered  Diversional Activities: television  Taken 12/4/2022 2200 by Jazmin Rae RN  Medical Device Protection: IV pole/bag removed from visual field  Taken 12/4/2022 2156 by Jazmin Rae RN  Medical Device Protection:   IV pole/bag removed from visual field   torso covered   tubing secured  Less Restrictive Alternative:   bed alarm in use   positive reinforcement provided   calming techniques promoted   therapeutic music   emotional support provided  De-Escalation Techniques:   medication administered   verbally redirected  Diversional Activities: television  Taken 12/4/2022 2000 by Jazmin Rae RN  Medical Device Protection:   IV pole/bag removed from visual field   torso covered   tubing secured  Less Restrictive Alternative:   bed alarm in use   positive reinforcement provided   calming techniques promoted   therapeutic music   emotional support provided  De-Escalation Techniques:   appropriate behavior reinforced   verbally redirected   medication offered  Diversional Activities: television  Taken 12/4/2022 1934 by Jazmin Rae RN  Medical Device Protection:   tubing secured   torso covered   IV pole/bag removed from visual field  Less Restrictive Alternative:   bed alarm in use   calming techniques promoted   positive reinforcement provided  De-Escalation Techniques:   stimulation decreased   verbally redirected  Diversional Activities: television  Intervention: Protect Skin and Joint Integrity  Description: Frequently check restraint application site and document findings.  Release and replace at regular intervals per facility protocol.  Assist with frequent joint range of motion activity.  Recent Flowsheet Documentation  Taken 12/5/2022 0000 by Jazmin Rae RN  Body Position: weight shifting  Range of Motion: active ROM (range of motion) encouraged  Taken 12/4/2022 1934 by Jazmin Rae RN  Body Position: turned   Goal Outcome Evaluation:  Plan of Care Reviewed With:  patient

## 2022-12-05 NOTE — CASE MANAGEMENT/SOCIAL WORK
Continued Stay Note  DIANNE Bryant     Patient Name: Cheyenne Estrella  MRN: [id number]  Today's Date: 12/5/2022    Admit Date: 11/24/2022    Plan: [facility] accepted; pending clincial course. No precert or PASRR needed.   Discharge Plan     Row Name 12/05/22 4380       Plan    Plan Comments Barriers to d/c: Overall medical decline; Has hematoma  with decreased HGB from anti-coag; MAC; G-tube and anticoag on hold.           Expected Discharge Date and Time     Expected Discharge Date Expected Discharge Time    Dec 6, 2022             Gia Chavez RN

## 2022-12-05 NOTE — PROGRESS NOTES
LOS: 11 days   Patient Care Team:  Melba Rosen APRN as PCP - General (Nurse Practitioner)      Subjective     Interval History: Nurse reports patient is not having diarrhea.  Dobbhoff in place and no tube feeds going at this time.  No vomiting.    Subjective: Patient does not answer questions.      ROS:   Review of Systems   Unable to perform ROS: Acuity of condition        Medication Review:   Scheduled Meds:atorvastatin, 80 mg, Per G Tube, Nightly  bisacodyl, 10 mg, Rectal, Once  cefepime, 2 g, Intravenous, Q24H  insulin lispro, 2-7 Units, Subcutaneous, Q6H  ipratropium-albuterol, 3 mL, Nebulization, 4x Daily - RT  lidocaine, 1 patch, Transdermal, Q24H  midodrine, 10 mg, Nasogastric, TID AC  sertraline, 50 mg, Per G Tube, Daily  sodium chloride, 10 mL, Intravenous, Q12H      Continuous Infusions:sodium chloride, 150 mL/hr, Last Rate: 150 mL/hr (12/05/22 4185)      PRN Meds:.•  bisacodyl  •  dextrose  •  dextrose  •  glucagon (human recombinant)  •  hydrALAZINE  •  labetalol  •  Morphine  •  ondansetron  •  sodium chloride  •  sodium chloride  •  sodium chloride  •  sodium chloride      Objective     Vital Signs  Temp:  [97.9 °F (36.6 °C)-100 °F (37.8 °C)] 98.2 °F (36.8 °C)  Heart Rate:  [] 103  Resp:  [18-25] 24  BP: (116-155)/(36-96) 124/42    Physical Exam:    General Appearance:    Awake, confused   Head:    Normocephalic, without obvious abnormality   Eyes:          Conjunctivae normal, anicteric sclera   Ears:    Hearing intact   Throat:   No oral lesions, no thrush, oral mucosa moist   Neck:   No adenopathy, supple, no JVD   Lungs:     Respirations regular, even and unlabored       Abdomen:     Soft, non-tender, no rebound or guarding, mildly distended lower abdomen, no hepatosplenomegaly   Rectal:     Deferred   Extremities:   +edema, no cyanosis, no redness   Skin:   No bleeding, bruising or rash, no jaundice            Results Review:    CBC  Results from last 7 days   Lab Units 12/05/22  0119  12/04/22  1801 12/04/22  1159 12/04/22  0605 12/03/22  2357 12/03/22  1926 12/03/22  1255 12/03/22  1110 12/03/22  0336 12/02/22  0331 12/01/22  0319 11/30/22  0257   RBC 10*6/mm3 2.48*  --  2.85*  --  2.53*  --   --   --  3.19* 4.04 4.11 4.19   WBC 10*3/mm3 29.90*  --  35.70*  --  37.60*  --   --   --  27.60* 13.00* 13.30* 12.90*   HEMOGLOBIN g/dL 7.4* 8.1* 8.5*  8.8* 8.7* 7.4* 8.0* 8.4*   < > 9.3* 12.2 12.2 12.5   PLATELETS 10*3/mm3 250  --  288  --  290  --   --   --  360 243 239 226    < > = values in this interval not displayed.       CMP  Results from last 7 days   Lab Units 12/05/22  0110 12/04/22  0605 12/03/22  0056 12/02/22  0331 12/01/22 0319 11/30/22  0257 11/29/22  0333   SODIUM mmol/L 149* 146* 149* 147* 147* 142 141   POTASSIUM mmol/L 3.9 4.6 4.2 4.0 3.8 3.9 3.7   CHLORIDE mmol/L 113* 109* 108* 107 108* 104 102   CO2 mmol/L 22.0 21.0* 28.0 30.0* 29.0 28.0 29.0   BUN mg/dL 85* 81* 48* 26* 24* 21 16   CREATININE mg/dL 2.28* 3.31* 1.73* 0.75 0.69 0.65 0.58   GLUCOSE mg/dL 130* 150* 224* 148* 169* 168* 159*   ALBUMIN g/dL 2.80* 2.90* 3.30* 3.50 3.40* 3.50 3.80   BILIRUBIN mg/dL 0.3 0.4 0.5 0.4 0.4 0.5 0.6   ALK PHOS U/L 111 114 135* 171* 159* 128* 120*   AST (SGOT) U/L 30 25 41* 111* 134* 85* 35*   ALT (SGPT) U/L 49* 53* 92* 139* 127* 67* 30       Amylase and Lipase        CRP     Results from last 7 days   Lab Units 12/05/22  0110 12/04/22  0605   CRP mg/dL 31.37* 24.07*       Imaging Results (Last 24 Hours)     Procedure Component Value Units Date/Time    US Renal Bilateral [[id number]] Collected: 12/05/22 0836     Updated: 12/05/22 0845    Narrative:      DATE OF EXAM:  12/5/2022 7:48 AM     PROCEDURE:  US RENAL BILATERAL-     INDICATIONS:  evaluate for left sided hydronephrosis from ureteral compression from  hematoma; I63.9-Cerebral infarction, unspecified; R63.30-Feeding  difficulties, unspecified     COMPARISON:  CT abdomen and pelvis 12/04/2022      TECHNIQUE:   Grayscale and color Doppler  ultrasound evaluation of the kidneys and  urinary bladder was performed.     FINDINGS:  The right kidney measures 8.4 x 4.7 x 4.5 cm. There are areas of  cortical thinning/scarring at the right kidney. Cyst at the mid right  kidney measures 1.7 cm. Hematoma in the left lower pelvis partially  imaged better assessed on CT. The left kidney measures 12.7 x 5.6 x 8  cm. Left upper pole renal cyst better assessed on CT. Negative for  hydronephrosis on the right. Mild left hydronephrosis and pelviectasis,  unchanged, limited sonographic window partially limits left kidney  assessment.       Impression:      1. Mild left renal pelviectasis and mild hydronephrosis similar to prior  CT exam.  2. Cortical thinning/scarring at the right kidney. No hydronephrosis on  the right.  3. Left lower pelvic hematoma partially imaged.     Electronically Signed By-Surendra Lin MD On:12/5/2022 8:43 AM  This report was finalized on [id number] by  Surendra Lin MD.    CT Abdomen Pelvis Without Contrast [[id number]] Collected: 12/04/22 1748     Updated: 12/04/22 1819    Narrative:         DATE OF EXAM:  12/4/2022 5:10 PM     PROCEDURE:  CT ABDOMEN PELVIS WO CONTRAST-     INDICATIONS:  Left rectus sheath hematoma with a history of active bleeding,  follow-up.     COMPARISON:  12/03/2022.     TECHNIQUE:  Routine transaxial slices were obtained through the abdomen and pelvis  without the administration of intravenous contrast. Reconstructed  coronal and sagittal images were also obtained. Automated exposure  control and iterative construction methods were used.      FINDINGS:  There is a left rectus sheath hematoma with hemorrhage extending into  the left aspect of the pelvis displacing the urinary bladder and uterus  rightward. The pelvic hematoma is of similar size when compared to the  previous study. It measures approximately 15.1 cm in sagittal plane and  6.5 cm in the coronal plane on image 118 of series 5. The hemorrhage is  hyperdense  consistent with clot. There are also hyperdense areas seen in  the left rectus sheath hematoma consistent with clots. There is no  contrast enhancement within the hematoma to indicate active bleeding on  today's study. There is more diffuse hemorrhage seen with throughout the  remaining pelvis slightly increased from the previous study. There is no  retroperitoneal hemorrhage. The gallbladder is distended with a  calcified stone. The liver, adrenal glands, pancreas, and spleen are  normal. The right kidney is small in size with chronic scarring. There  are bilateral renal cysts. There is a colonic diverticulosis without  evidence of acute diverticulitis. The appendix is normal. There are no  dilated loops of bowel to indicate an obstructive process. The tip of  the Dobbhoff feeding tube terminates in the body of the stomach. There  are atherosclerotic vascular calcifications throughout the abdomen and  pelvis. There has been interval development of a trace left pleural  effusion. There is left lower lobe airspace disease suspicious for  pneumonia. There also may be some patchy pneumonia in the right lower  lobe. There are no suspicious osteolytic or sclerotic lesions within the  bony structures. There is slight grade 1 anterior spondylolisthesis of  L4 on L5 secondary to facet arthritis.       Impression:         1. Left rectus sheath hematoma with hemorrhage extending into the left  aspect of the pelvis. The pelvic hematoma is of similar size when  compared to previous study. The hemorrhage now demonstrate hyperdense  clot. There are also hyperdense clots seen within the left rectus sheath  hematoma. There is no contrast enhancement to indicate active bleeding.  2. There is now more diffuse hemorrhage seen within the pelvis slightly  increased from the previous exam.  3. No retroperitoneal hemorrhage.  4. Distended gallbladder with cholelithiasis.  5. Colonic diverticulosis without acute diverticulitis.  6.  Interval development of a trace left basilar pleural effusion with  left lower lobe airspace disease suspicious for pneumonia. There also  may be patchy pneumonia in the right lower lobe.  7. Additional findings as noted above.     Electronically Signed By-Krystian Roberts MD On:12/4/2022 6:17 PM  This report was finalized on [id number] by  Krystian Roberts MD.          Assessment & Plan     -Feeding difficulty secondary to dysphagia/acute CVA  -Aphasia  -Acute CVA  -Elevated LFTs  -Left rectus sheath hematoma with hemorrhage extending to the left pelvis  -Distended gallbladder/cholelithiasis  -Hypertension  -Hyperlipidemia  -Anxiety  -History of CVA     PLAN:  Patient is a 72-year-old female with history of CVA, hypertension, and hyperlipidemia who presented on 11/24 with complaints of aphasia and weakness.  MRI shows new areas of acute infarct in the right precentral gyrus cortex and within the inferior right frontal lobe.  GI originally asked to place PEG.  Plan was for this to be performed after Plavix had been held.  However, imaging shows left rectus sheath hematoma with hemorrhage extending into the left pelvis.  Repeat imaging does show similar size when compared to previous and no contrast enhancement to indicate active bleeding.  Interventional radiology consulted but no intervention needed.  General surgery has also been following.  Hemoglobin today is 7.4.  Continue to monitor H&H and transfuse as needed.  WBC down to 29.9.  On cefepime.  ALT down to 49 and otherwise liver enzymes are normal.  Continue to monitor labs and provide supportive care.  Okay to start Dobbhoff tube feeds today.  No plan for PEG placement at this time.      Yvonne Watkins, SARTHAK  12/05/22  11:45 EST

## 2022-12-05 NOTE — PLAN OF CARE
Goal Outcome Evaluation:        Problem: Adult Inpatient Plan of Care  Goal: Plan of Care Review  Outcome: Ongoing, Progressing  Goal: Patient-Specific Goal (Individualized)  Outcome: Ongoing, Progressing  Goal: Absence of Hospital-Acquired Illness or Injury  Outcome: Ongoing, Progressing  Intervention: Identify and Manage Fall Risk  Recent Flowsheet Documentation  Taken 12/4/2022 1800 by Verito Giron LPN  Safety Promotion/Fall Prevention:   activity supervised   assistive device/personal items within reach   clutter free environment maintained   elopement precautions   fall prevention program maintained   gait belt   nonskid shoes/slippers when out of bed   safety round/check completed  Taken 12/4/2022 1600 by Verito Giron LPN  Safety Promotion/Fall Prevention:   assistive device/personal items within reach   activity supervised   clutter free environment maintained   elopement precautions   fall prevention program maintained   gait belt   nonskid shoes/slippers when out of bed   safety round/check completed  Taken 12/4/2022 1400 by Verito Giron LPN  Safety Promotion/Fall Prevention:   activity supervised   assistive device/personal items within reach   clutter free environment maintained   elopement precautions   fall prevention program maintained   gait belt   nonskid shoes/slippers when out of bed   safety round/check completed  Taken 12/4/2022 1200 by Verito Giron LPN  Safety Promotion/Fall Prevention:   activity supervised   assistive device/personal items within reach   clutter free environment maintained   elopement precautions   fall prevention program maintained   gait belt   nonskid shoes/slippers when out of bed   safety round/check completed  Taken 12/4/2022 1000 by Verito Giron LPN  Safety Promotion/Fall Prevention:   activity supervised   assistive device/personal items within reach   clutter free environment maintained   elopement precautions   fall prevention program maintained   gait  belt   nonskid shoes/slippers when out of bed   safety round/check completed  Taken 12/4/2022 0800 by Verito Giron LPN  Safety Promotion/Fall Prevention:   activity supervised   assistive device/personal items within reach   clutter free environment maintained   elopement precautions   fall prevention program maintained   gait belt   nonskid shoes/slippers when out of bed   safety round/check completed  Intervention: Prevent Skin Injury  Recent Flowsheet Documentation  Taken 12/4/2022 1600 by Verito Giron LPN  Skin Protection:   adhesive use limited   incontinence pads utilized   skin sealant/moisture barrier applied   skin-to-device areas padded   tubing/devices free from skin contact  Taken 12/4/2022 1200 by Verito Giron LPN  Skin Protection:   adhesive use limited   incontinence pads utilized   skin sealant/moisture barrier applied   skin-to-device areas padded   tubing/devices free from skin contact  Taken 12/4/2022 0800 by Verito Giron LPN  Skin Protection:   adhesive use limited   incontinence pads utilized   skin sealant/moisture barrier applied   skin-to-device areas padded  Intervention: Prevent and Manage VTE (Venous Thromboembolism) Risk  Recent Flowsheet Documentation  Taken 12/4/2022 1600 by Verito Giron LPN  VTE Prevention/Management:   sequential compression devices on   bilateral  Range of Motion: active ROM (range of motion) encouraged  Taken 12/4/2022 1200 by Verito Giron LPN  VTE Prevention/Management:   sequential compression devices on   bilateral  Range of Motion: active ROM (range of motion) encouraged  Taken 12/4/2022 0800 by Verito Giron LPN  VTE Prevention/Management:   sequential compression devices on   bilateral  Range of Motion: active ROM (range of motion) encouraged  Intervention: Prevent Infection  Recent Flowsheet Documentation  Taken 12/4/2022 1800 by Verito Giron LPN  Infection Prevention:   environmental surveillance performed   equipment surfaces  disinfected   hand hygiene promoted   personal protective equipment utilized   single patient room provided  Taken 12/4/2022 1600 by Verito Giron LPN  Infection Prevention:   cohorting utilized   environmental surveillance performed   equipment surfaces disinfected   hand hygiene promoted   personal protective equipment utilized   single patient room provided  Taken 12/4/2022 1400 by Verito Giron LPN  Infection Prevention:   environmental surveillance performed   equipment surfaces disinfected   hand hygiene promoted   single patient room provided  Taken 12/4/2022 1200 by Verito Giron LPN  Infection Prevention:   environmental surveillance performed   equipment surfaces disinfected   hand hygiene promoted   personal protective equipment utilized   single patient room provided  Taken 12/4/2022 1000 by Verito Giron LPN  Infection Prevention:   environmental surveillance performed   equipment surfaces disinfected   hand hygiene promoted   single patient room provided  Taken 12/4/2022 0800 by Verito Giron LPN  Infection Prevention:   environmental surveillance performed   equipment surfaces disinfected   hand hygiene promoted   rest/sleep promoted   single patient room provided  Goal: Optimal Comfort and Wellbeing  Outcome: Ongoing, Progressing  Intervention: Monitor Pain and Promote Comfort  Recent Flowsheet Documentation  Taken 12/4/2022 0800 by Verito Giron LPN  Pain Management Interventions: see MAR  Intervention: Provide Person-Centered Care  Recent Flowsheet Documentation  Taken 12/4/2022 1600 by Verito Giron LPN  Trust Relationship/Rapport:   care explained   questions answered   questions encouraged   thoughts/feelings acknowledged  Taken 12/4/2022 1200 by Verito Giron LPN  Trust Relationship/Rapport:   care explained   questions answered   questions encouraged   thoughts/feelings acknowledged  Taken 12/4/2022 0800 by Verito Giron LPN  Trust Relationship/Rapport:   care explained    questions answered   questions encouraged   thoughts/feelings acknowledged  Goal: Readiness for Transition of Care  Outcome: Ongoing, Progressing     Problem: Adjustment to Illness (Stroke, Ischemic/Transient Ischemic Attack)  Goal: Optimal Coping  Outcome: Ongoing, Progressing  Intervention: Support Psychosocial Response to Stroke  Recent Flowsheet Documentation  Taken 12/4/2022 1600 by Verito Giron LPN  Supportive Measures:   active listening utilized   decision-making supported   goal-setting facilitated   relaxation techniques promoted   verbalization of feelings encouraged  Family/Support System Care:   self-care encouraged   support provided  Taken 12/4/2022 1200 by Verito Giron LPN  Supportive Measures:   active listening utilized   decision-making supported   goal-setting facilitated   problem-solving facilitated  Family/Support System Care:   support provided   self-care encouraged  Taken 12/4/2022 0800 by Verito Girno LPN  Supportive Measures:   active listening utilized   goal-setting facilitated   decision-making supported   problem-solving facilitated   verbalization of feelings encouraged  Family/Support System Care:   family care conference arranged   self-care encouraged   support provided     Problem: Bowel Elimination Impaired (Stroke, Ischemic/Transient Ischemic Attack)  Goal: Effective Bowel Elimination  Outcome: Ongoing, Progressing     Problem: Cerebral Tissue Perfusion (Stroke, Ischemic/Transient Ischemic Attack)  Goal: Optimal Cerebral Tissue Perfusion  Outcome: Ongoing, Progressing  Intervention: Protect and Optimize Cerebral Perfusion  Recent Flowsheet Documentation  Taken 12/4/2022 1800 by Verito Giron LPN  Stabilization Measures: legs elevated  Taken 12/4/2022 1600 by Verito Giron LPN  Stabilization Measures: legs elevated  Fluid/Electrolyte Management: fluids restricted  Sensory Stimulation Regulation:   care clustered   lighting decreased   television on  Cerebral  Perfusion Promotion: blood pressure monitored  Taken 12/4/2022 1400 by Verito Giron LPN  Stabilization Measures: legs elevated  Taken 12/4/2022 1200 by Verito Giron LPN  Stabilization Measures: legs elevated  Fluid/Electrolyte Management: fluids restricted  Sensory Stimulation Regulation:   care clustered   lighting decreased   television on  Cerebral Perfusion Promotion: blood pressure monitored  Taken 12/4/2022 1000 by Verito Giron LPN  Stabilization Measures: legs elevated  Taken 12/4/2022 0800 by Verito Giron LPN  Stabilization Measures: legs elevated  Fluid/Electrolyte Management: fluids restricted  Sensory Stimulation Regulation:   care clustered   lighting decreased   television on  Cerebral Perfusion Promotion: blood pressure monitored     Problem: Cognitive Impairment (Stroke, Ischemic/Transient Ischemic Attack)  Goal: Optimal Cognitive Function  Outcome: Ongoing, Progressing  Intervention: Optimize Cognitive Function  Recent Flowsheet Documentation  Taken 12/4/2022 1600 by Verito Giron LPN  Sensory Stimulation Regulation:   care clustered   lighting decreased   television on  Reorientation Measures:   clock in view   reorientation provided  Environment Familiarity/Consistency: daily routine followed  Taken 12/4/2022 1200 by Verito Giron LPN  Sensory Stimulation Regulation:   care clustered   lighting decreased   television on  Reorientation Measures:   clock in view   reorientation provided  Environment Familiarity/Consistency: daily routine followed  Taken 12/4/2022 0800 by Verito Giron LPN  Sensory Stimulation Regulation:   care clustered   lighting decreased   television on  Reorientation Measures:   clock in view   reorientation provided     Problem: Communication Impairment (Stroke, Ischemic/Transient Ischemic Attack)  Goal: Improved Communication Skills  Outcome: Ongoing, Progressing  Intervention: Optimize Communication Skills  Recent Flowsheet Documentation  Taken 12/4/2022  1600 by Verito Giron LPN  Communication Enhancement Strategies:   call light answered in person   communication board used   device use encouraged   extra time allowed for response   family involved in communication plan   family/caregiver assisted with communication  Taken 12/4/2022 1200 by Verito Giron LPN  Communication Enhancement Strategies:   call light answered in person   communication board used   device use encouraged   extra time allowed for response   family involved in communication plan   family/caregiver assisted with communication  Taken 12/4/2022 0800 by Verito Giron LPN  Communication Enhancement Strategies:   call light answered in person   communication board used   device use encouraged   extra time allowed for response   family involved in communication plan     Problem: Functional Ability Impaired (Stroke, Ischemic/Transient Ischemic Attack)  Goal: Optimal Functional Ability  Outcome: Ongoing, Progressing     Problem: Respiratory Compromise (Stroke, Ischemic/Transient Ischemic Attack)  Goal: Effective Oxygenation and Ventilation  Outcome: Ongoing, Progressing  Intervention: Optimize Oxygenation and Ventilation  Recent Flowsheet Documentation  Taken 12/4/2022 1600 by Verito Giron LPN  Airway/Ventilation Management:   airway patency maintained   calming measures promoted   humidification applied  Taken 12/4/2022 1200 by Verito Giron LPN  Airway/Ventilation Management:   airway patency maintained   calming measures promoted   humidification applied  Taken 12/4/2022 0800 by Verito Giron LPN  Airway/Ventilation Management:   airway patency maintained   calming measures promoted   humidification applied     Problem: Sensorimotor Impairment (Stroke, Ischemic/Transient Ischemic Attack)  Goal: Improved Sensorimotor Function  Outcome: Ongoing, Progressing  Intervention: Optimize Range of Motion, Motor Control and Function  Recent Flowsheet Documentation  Taken 12/4/2022 1600 by Bree  Jamani, LPN  Spasticity Management: positioned with supportive device  Range of Motion: active ROM (range of motion) encouraged  Taken 12/4/2022 1200 by Verito Giron LPN  Spasticity Management: positioned with supportive device  Range of Motion: active ROM (range of motion) encouraged  Taken 12/4/2022 0800 by Verito Giron LPN  Spasticity Management: positioned with supportive device  Range of Motion: active ROM (range of motion) encouraged  Intervention: Optimize Sensory and Perceptual Ability  Recent Flowsheet Documentation  Taken 12/4/2022 1600 by Verito Giron LPN  Pressure Reduction Techniques:   frequent weight shift encouraged   weight shift assistance provided  Pressure Reduction Devices:   positioning supports utilized   pressure-redistributing mattress utilized  Taken 12/4/2022 1200 by Verito Giron LPN  Pressure Reduction Techniques:   frequent weight shift encouraged   weight shift assistance provided  Pressure Reduction Devices:   positioning supports utilized   pressure-redistributing mattress utilized  Taken 12/4/2022 0800 by Verito Giron LPN  Pressure Reduction Techniques:   frequent weight shift encouraged   weight shift assistance provided  Pressure Reduction Devices:   positioning supports utilized   pressure-redistributing mattress utilized     Problem: Swallowing Impairment (Stroke, Ischemic/Transient Ischemic Attack)  Goal: Optimal Eating and Swallowing without Aspiration  Outcome: Ongoing, Progressing  Intervention: Optimize Eating and Swallowing  Recent Flowsheet Documentation  Taken 12/4/2022 1800 by Verito Giron LPN  Aspiration Precautions: awake/alert before oral intake  Taken 12/4/2022 1600 by Verito Giron LPN  Aspiration Precautions:   awake/alert before oral intake   oral hygiene care promoted   respiratory status monitored   upright posture maintained  Taken 12/4/2022 1400 by Verito Giron LPN  Aspiration Precautions: awake/alert before oral intake  Taken 12/4/2022  1200 by Verito Giron LPN  Aspiration Precautions:   awake/alert before oral intake   oral hygiene care promoted   upright posture maintained  Taken 12/4/2022 1000 by Verito Giron LPN  Aspiration Precautions: awake/alert before oral intake  Taken 12/4/2022 0800 by Verito Giron LPN  Aspiration Precautions:   awake/alert before oral intake   oral hygiene care promoted   respiratory status monitored   upright posture maintained     Problem: Urinary Elimination Impaired (Stroke, Ischemic/Transient Ischemic Attack)  Goal: Effective Urinary Elimination  Outcome: Ongoing, Progressing  Intervention: Promote Effective Bladder Elimination  Recent Flowsheet Documentation  Taken 12/4/2022 1600 by Verito Giron LPN  Urinary Elimination Promotion:   absorbent pad/diaper use encouraged   catheter patency maintained   positioned for ease of voiding  Taken 12/4/2022 1200 by Verito Giron LPN  Urinary Elimination Promotion:   absorbent pad/diaper use encouraged   catheter patency maintained  Taken 12/4/2022 0800 by Verito Giron LPN  Urinary Elimination Promotion: absorbent pad/diaper use encouraged     Problem: Asthma Comorbidity  Goal: Maintenance of Asthma Control  Outcome: Ongoing, Progressing  Intervention: Maintain Asthma Symptom Control  Recent Flowsheet Documentation  Taken 12/4/2022 1800 by Verito Giron LPN  Medication Review/Management: medications reviewed  Taken 12/4/2022 1600 by Verito Giron LPN  Medication Review/Management: medications reviewed  Taken 12/4/2022 1400 by Verito Giron LPN  Medication Review/Management: medications reviewed  Taken 12/4/2022 1200 by Verito Giron LPN  Medication Review/Management: medications reviewed  Taken 12/4/2022 1000 by Verito Giron LPN  Medication Review/Management: medications reviewed  Taken 12/4/2022 0800 by Verito Giron LPN  Medication Review/Management: medications reviewed     Problem: Behavioral Health Comorbidity  Goal: Maintenance of Behavioral  Health Symptom Control  Outcome: Ongoing, Progressing  Intervention: Maintain Behavioral Health Symptom Control  Recent Flowsheet Documentation  Taken 12/4/2022 1800 by Verito Giron LPN  Medication Review/Management: medications reviewed  Taken 12/4/2022 1600 by Verito Giron LPN  Medication Review/Management: medications reviewed  Taken 12/4/2022 1400 by Verito Giron LPN  Medication Review/Management: medications reviewed  Taken 12/4/2022 1200 by Verito Giron LPN  Medication Review/Management: medications reviewed  Taken 12/4/2022 1000 by Verito Giron LPN  Medication Review/Management: medications reviewed  Taken 12/4/2022 0800 by Verito Giron LPN  Medication Review/Management: medications reviewed     Problem: COPD (Chronic Obstructive Pulmonary Disease) Comorbidity  Goal: Maintenance of COPD Symptom Control  Outcome: Ongoing, Progressing  Intervention: Maintain COPD-Symptom Control  Recent Flowsheet Documentation  Taken 12/4/2022 1800 by Verito Giron LPN  Medication Review/Management: medications reviewed  Taken 12/4/2022 1600 by Verito Giron LPN  Supportive Measures:   active listening utilized   decision-making supported   goal-setting facilitated   relaxation techniques promoted   verbalization of feelings encouraged  Medication Review/Management: medications reviewed  Taken 12/4/2022 1400 by Verito Giron LPN  Medication Review/Management: medications reviewed  Taken 12/4/2022 1200 by Verito Giron LPN  Supportive Measures:   active listening utilized   decision-making supported   goal-setting facilitated   problem-solving facilitated  Medication Review/Management: medications reviewed  Taken 12/4/2022 1000 by Verito Giron LPN  Medication Review/Management: medications reviewed  Taken 12/4/2022 0800 by Verito Giron LPN  Supportive Measures:   active listening utilized   goal-setting facilitated   decision-making supported   problem-solving facilitated   verbalization of  feelings encouraged  Medication Review/Management: medications reviewed     Problem: Diabetes Comorbidity  Goal: Blood Glucose Level Within Targeted Range  Outcome: Ongoing, Progressing     Problem: Heart Failure Comorbidity  Goal: Maintenance of Heart Failure Symptom Control  Outcome: Ongoing, Progressing  Intervention: Maintain Heart Failure-Management  Recent Flowsheet Documentation  Taken 12/4/2022 1800 by Verito Giron LPN  Medication Review/Management: medications reviewed  Taken 12/4/2022 1600 by Verito Giron LPN  Medication Review/Management: medications reviewed  Taken 12/4/2022 1400 by Verito Giron LPN  Medication Review/Management: medications reviewed  Taken 12/4/2022 1200 by Verito Giron LPN  Medication Review/Management: medications reviewed  Taken 12/4/2022 1000 by Verito Giron LPN  Medication Review/Management: medications reviewed  Taken 12/4/2022 0800 by Verito Giron LPN  Medication Review/Management: medications reviewed     Problem: Hypertension Comorbidity  Goal: Blood Pressure in Desired Range  Outcome: Ongoing, Progressing  Intervention: Maintain Blood Pressure Management  Recent Flowsheet Documentation  Taken 12/4/2022 1800 by Verito Giron LPN  Medication Review/Management: medications reviewed  Taken 12/4/2022 1600 by Verito Giron LPN  Syncope Management: position changed slowly  Medication Review/Management: medications reviewed  Taken 12/4/2022 1400 by Verito Giron LPN  Medication Review/Management: medications reviewed  Taken 12/4/2022 1200 by Verito Giron LPN  Syncope Management: position changed slowly  Medication Review/Management: medications reviewed  Taken 12/4/2022 1000 by Verito Giron LPN  Medication Review/Management: medications reviewed  Taken 12/4/2022 0800 by Verito Giron LPN  Syncope Management: position changed slowly  Medication Review/Management: medications reviewed     Problem: Obstructive Sleep Apnea Risk or Actual Comorbidity  Management  Goal: Unobstructed Breathing During Sleep  Outcome: Ongoing, Progressing  Intervention: Monitor and Manage Obstructive Sleep Apnea  Recent Flowsheet Documentation  Taken 12/4/2022 1600 by Verito Giron LPN  NPPV/CPAP Maintenance: nasal prongs secure  Taken 12/4/2022 1200 by Verito Giron LPN  NPPV/CPAP Maintenance: nasal prongs secure  Taken 12/4/2022 0800 by Verito Giron LPN  NPPV/CPAP Maintenance: nasal prongs secure     Problem: Osteoarthritis Comorbidity  Goal: Maintenance of Osteoarthritis Symptom Control  Outcome: Ongoing, Progressing  Intervention: Maintain Osteoarthritis Symptom Control  Recent Flowsheet Documentation  Taken 12/4/2022 1800 by Verito Giron LPN  Medication Review/Management: medications reviewed  Taken 12/4/2022 1600 by Verito Giron LPN  Medication Review/Management: medications reviewed  Taken 12/4/2022 1400 by Verito Giron LPN  Medication Review/Management: medications reviewed  Taken 12/4/2022 1200 by Verito Giron LPN  Medication Review/Management: medications reviewed  Taken 12/4/2022 1000 by Verito Giron LPN  Medication Review/Management: medications reviewed  Taken 12/4/2022 0800 by Verito Giron LPN  Medication Review/Management: medications reviewed     Problem: Pain Chronic (Persistent) (Comorbidity Management)  Goal: Acceptable Pain Control and Functional Ability  Outcome: Ongoing, Progressing  Intervention: Manage Persistent Pain  Recent Flowsheet Documentation  Taken 12/4/2022 1800 by Verito Giron LPN  Medication Review/Management: medications reviewed  Taken 12/4/2022 1600 by Verito Giron LPN  Sleep/Rest Enhancement:   awakenings minimized   consistent schedule promoted   family presence promoted   relaxation techniques promoted   room darkened  Medication Review/Management: medications reviewed  Taken 12/4/2022 1400 by Verito Giron LPN  Medication Review/Management: medications reviewed  Taken 12/4/2022 1200 by Verito Giron  LPN  Sleep/Rest Enhancement:   awakenings minimized   consistent schedule promoted   family presence promoted   natural light exposure provided   relaxation techniques promoted   room darkened  Medication Review/Management: medications reviewed  Taken 12/4/2022 1000 by Verito Giron LPN  Medication Review/Management: medications reviewed  Taken 12/4/2022 0800 by Verito Giron LPN  Sleep/Rest Enhancement:   awakenings minimized   natural light exposure provided   regular sleep/rest pattern promoted   family presence promoted  Medication Review/Management: medications reviewed  Intervention: Develop Pain Management Plan  Recent Flowsheet Documentation  Taken 12/4/2022 0800 by Verito Giron LPN  Pain Management Interventions: see MAR  Intervention: Optimize Psychosocial Wellbeing  Recent Flowsheet Documentation  Taken 12/4/2022 1600 by Verito Giron LPN  Supportive Measures:   active listening utilized   decision-making supported   goal-setting facilitated   relaxation techniques promoted   verbalization of feelings encouraged  Diversional Activities: television  Family/Support System Care:   self-care encouraged   support provided  Taken 12/4/2022 1200 by Verito Giron LPN  Supportive Measures:   active listening utilized   decision-making supported   goal-setting facilitated   problem-solving facilitated  Diversional Activities: television  Family/Support System Care:   support provided   self-care encouraged  Taken 12/4/2022 0800 by Verito Giron LPN  Supportive Measures:   active listening utilized   goal-setting facilitated   decision-making supported   problem-solving facilitated   verbalization of feelings encouraged  Diversional Activities: television  Family/Support System Care:   family care conference arranged   self-care encouraged   support provided     Problem: Seizure Disorder Comorbidity  Goal: Maintenance of Seizure Control  Outcome: Ongoing, Progressing  Intervention: Maintain  Seizure-Symptom Control  Recent Flowsheet Documentation  Taken 12/4/2022 1800 by Verito Giron LPN  Seizure Precautions:   activity supervised   clutter-free environment maintained   emergency equipment at bedside  Taken 12/4/2022 1600 by Verito Giron LPN  Seizure Precautions:   activity supervised   clutter-free environment maintained   emergency equipment at bedside  Taken 12/4/2022 1400 by Verito Giron LPN  Seizure Precautions:   activity supervised   clutter-free environment maintained   emergency equipment at bedside  Taken 12/4/2022 1200 by Verito Giron LPN  Seizure Precautions:   activity supervised   clutter-free environment maintained   emergency equipment at bedside  Taken 12/4/2022 1000 by Verito Giron LPN  Seizure Precautions:   activity supervised   clutter-free environment maintained   emergency equipment at bedside  Taken 12/4/2022 0800 by Verito Giron LPN  Seizure Precautions:   activity supervised   clutter-free environment maintained   emergency equipment at bedside     Problem: Skin Injury Risk Increased  Goal: Skin Health and Integrity  Outcome: Ongoing, Progressing  Intervention: Optimize Skin Protection  Recent Flowsheet Documentation  Taken 12/4/2022 1600 by Verito Giron LPN  Pressure Reduction Techniques:   frequent weight shift encouraged   weight shift assistance provided  Pressure Reduction Devices:   positioning supports utilized   pressure-redistributing mattress utilized  Skin Protection:   adhesive use limited   incontinence pads utilized   skin sealant/moisture barrier applied   skin-to-device areas padded   tubing/devices free from skin contact  Taken 12/4/2022 1200 by Verito Giron LPN  Pressure Reduction Techniques:   frequent weight shift encouraged   weight shift assistance provided  Pressure Reduction Devices:   positioning supports utilized   pressure-redistributing mattress utilized  Skin Protection:   adhesive use limited   incontinence pads utilized    skin sealant/moisture barrier applied   skin-to-device areas padded   tubing/devices free from skin contact  Taken 12/4/2022 0800 by Verito Giron LPN  Pressure Reduction Techniques:   frequent weight shift encouraged   weight shift assistance provided  Pressure Reduction Devices:   positioning supports utilized   pressure-redistributing mattress utilized  Skin Protection:   adhesive use limited   incontinence pads utilized   skin sealant/moisture barrier applied   skin-to-device areas padded     Problem: Fall Injury Risk  Goal: Absence of Fall and Fall-Related Injury  Outcome: Ongoing, Progressing  Intervention: Identify and Manage Contributors  Recent Flowsheet Documentation  Taken 12/4/2022 1800 by Verito Giron LPN  Medication Review/Management: medications reviewed  Taken 12/4/2022 1600 by Verito Giron LPN  Medication Review/Management: medications reviewed  Taken 12/4/2022 1400 by Verito Giron LPN  Medication Review/Management: medications reviewed  Taken 12/4/2022 1200 by Verito Giron LPN  Medication Review/Management: medications reviewed  Taken 12/4/2022 1000 by Verito Giron LPN  Medication Review/Management: medications reviewed  Taken 12/4/2022 0800 by Verito Giron LPN  Medication Review/Management: medications reviewed  Intervention: Promote Injury-Free Environment  Recent Flowsheet Documentation  Taken 12/4/2022 1800 by Verito Giron LPN  Safety Promotion/Fall Prevention:   activity supervised   assistive device/personal items within reach   clutter free environment maintained   elopement precautions   fall prevention program maintained   gait belt   nonskid shoes/slippers when out of bed   safety round/check completed  Taken 12/4/2022 1600 by Verito Giron LPN  Safety Promotion/Fall Prevention:   assistive device/personal items within reach   activity supervised   clutter free environment maintained   elopement precautions   fall prevention program maintained   gait belt    nonskid shoes/slippers when out of bed   safety round/check completed  Taken 12/4/2022 1400 by Verito Giron LPN  Safety Promotion/Fall Prevention:   activity supervised   assistive device/personal items within reach   clutter free environment maintained   elopement precautions   fall prevention program maintained   gait belt   nonskid shoes/slippers when out of bed   safety round/check completed  Taken 12/4/2022 1200 by Verito Giron LPN  Safety Promotion/Fall Prevention:   activity supervised   assistive device/personal items within reach   clutter free environment maintained   elopement precautions   fall prevention program maintained   gait belt   nonskid shoes/slippers when out of bed   safety round/check completed  Taken 12/4/2022 1000 by Verito Giron LPN  Safety Promotion/Fall Prevention:   activity supervised   assistive device/personal items within reach   clutter free environment maintained   elopement precautions   fall prevention program maintained   gait belt   nonskid shoes/slippers when out of bed   safety round/check completed  Taken 12/4/2022 0800 by Verito Giron LPN  Safety Promotion/Fall Prevention:   activity supervised   assistive device/personal items within reach   clutter free environment maintained   elopement precautions   fall prevention program maintained   gait belt   nonskid shoes/slippers when out of bed   safety round/check completed     Problem: Aspiration (Enteral Nutrition)  Goal: Absence of Aspiration Signs and Symptoms  Outcome: Ongoing, Progressing  Intervention: Minimize Aspiration Risk  Recent Flowsheet Documentation  Taken 12/4/2022 0800 by Verito iGron LPN  Enteral Feeding Safety: placement checked     Problem: Device-Related Complication Risk (Enteral Nutrition)  Goal: Safe, Effective Therapy Delivery  Outcome: Ongoing, Progressing  Intervention: Prevent Feeding-Related Adverse Events  Recent Flowsheet Documentation  Taken 12/4/2022 0800 by Verito Giron  LPN  Enteral Feeding Safety: placement checked     Problem: Feeding Intolerance (Enteral Nutrition)  Goal: Feeding Tolerance  Outcome: Ongoing, Progressing  Intervention: Prevent and Manage Feeding Intolerance  Recent Flowsheet Documentation  Taken 12/4/2022 1600 by Verito Giron LPN  Nutrition Support Management: tube feeding held  Taken 12/4/2022 1200 by Verito Giron LPN  Nutrition Support Management: tube feeding held  Taken 12/4/2022 0800 by Verito Giron LPN  Nutrition Support Management: tube feeding held

## 2022-12-05 NOTE — PROGRESS NOTES
LOS: 11 days     Chief Complaint/ Reason for encounter: Acute kidney injury    Subjective   12/05/22 : She remains confused, poor historian but does nod her head yes and no  No complaints  Discussed with family at bedside  Williamson in place and urine output seems to be increasing  No vomiting reported, Dobbhoff tube remains in place      Medical history reviewed:  History of Present Illness    Subjective    History taken from: Patient and chart    Vital Signs  Temp:  [97.9 °F (36.6 °C)-100 °F (37.8 °C)] 98.7 °F (37.1 °C)  Heart Rate:  [] 114  Resp:  [18-25] 22  BP: (118-155)/(42-96) 122/53       Wt Readings from Last 1 Encounters:   12/05/22 0410 95.1 kg (209 lb 10.5 oz)   12/04/22 0548 97 kg (213 lb 13.5 oz)   12/03/22 0620 95.1 kg (209 lb 10.5 oz)   11/28/22 0458 77.8 kg (171 lb 8.3 oz)   11/26/22 1442 77.1 kg (170 lb)   11/24/22 2148 77.1 kg (170 lb)       Objective    Objective:  General Appearance:  Comfortable, confused, ill-appearing, in no acute distress and not in pain.  Awakens to voice but not oriented  HEENT: Mucous membranes moist, no injury, oropharynx clear  Lungs:  Normal effort and normal respiratory rate.  Breath sounds clear to auscultation.  No  respiratory distress.  No rales, decreased breath sounds or rhonchi.    Heart: Normal rate.  Regular rhythm.  S1, S2 normal.  No murmur.   Abdomen: Abdomen is soft.  Bowel sounds are normal, no abdominal tenderness.  There is no rebound or guarding  Extremities: 1+ edema of bilateral lower extremities  Neurological: No focal motor or sensory deficits, pupils reactive  Skin:  Warm and dry.  No rash or cyanosis.       Results Review:    Intake/Output:     Intake/Output Summary (Last 24 hours) at 12/5/2022 1444  Last data filed at 12/5/2022 1408  Gross per 24 hour   Intake 1452.92 ml   Output 1750 ml   Net -297.08 ml         DATA:  Radiology and Labs:  The following labs independently reviewed by me. Additional labs ordered for tomorrow a.m.  Interval  notes, chart personally reviewed by me.   Old records independently reviewed showing normal baseline creatinine of 0.6  The following radiologic studies independently viewed by me, findings renal ultrasound showing:Mild left renal pelviectasis and mild hydronephrosis similar to prior  CT exam.  2. Cortical thinning/scarring at the right kidney. No hydronephrosis on  the right.  New problems include KVNG, MARIAH  Discussed with patient and multiple family members at bedside    Risk/ complexity of medical care/ medical decision making high risk, severe acute renal failure, consideration for possible dialysis    Labs:   Recent Results (from the past 24 hour(s))   POC Glucose Once    Collection Time: 12/04/22  4:46 PM    Specimen: Blood   Result Value Ref Range    Glucose 139 (H) 70 - 105 mg/dL   Hemoglobin & Hematocrit, Blood    Collection Time: 12/04/22  6:01 PM    Specimen: Blood   Result Value Ref Range    Hemoglobin 8.1 (L) 12.0 - 15.9 g/dL    Hematocrit 25.0 (L) 34.0 - 46.6 %   POC Glucose Once    Collection Time: 12/05/22 12:06 AM    Specimen: Blood   Result Value Ref Range    Glucose 125 (H) 70 - 105 mg/dL   Comprehensive Metabolic Panel    Collection Time: 12/05/22  1:10 AM    Specimen: Blood   Result Value Ref Range    Glucose 130 (H) 65 - 99 mg/dL    BUN 85 (H) 8 - 23 mg/dL    Creatinine 2.28 (H) 0.57 - 1.00 mg/dL    Sodium 149 (H) 136 - 145 mmol/L    Potassium 3.9 3.5 - 5.2 mmol/L    Chloride 113 (H) 98 - 107 mmol/L    CO2 22.0 22.0 - 29.0 mmol/L    Calcium 8.5 (L) 8.6 - 10.5 mg/dL    Total Protein 6.2 6.0 - 8.5 g/dL    Albumin 2.80 (L) 3.50 - 5.20 g/dL    ALT (SGPT) 49 (H) 1 - 33 U/L    AST (SGOT) 30 1 - 32 U/L    Alkaline Phosphatase 111 39 - 117 U/L    Total Bilirubin 0.3 0.0 - 1.2 mg/dL    Globulin 3.4 gm/dL    A/G Ratio 0.8 g/dL    BUN/Creatinine Ratio 37.3 (H) 7.0 - 25.0    Anion Gap 14.0 5.0 - 15.0 mmol/L    eGFR 22.2 (L) >60.0 mL/min/1.73   Magnesium    Collection Time: 12/05/22  1:10 AM    Specimen:  Blood   Result Value Ref Range    Magnesium 2.6 (H) 1.6 - 2.4 mg/dL   Phosphorus    Collection Time: 12/05/22  1:10 AM    Specimen: Blood   Result Value Ref Range    Phosphorus 4.9 (H) 2.5 - 4.5 mg/dL   C-reactive Protein    Collection Time: 12/05/22  1:10 AM    Specimen: Blood   Result Value Ref Range    C-Reactive Protein 31.37 (H) 0.00 - 0.50 mg/dL   Sedimentation Rate    Collection Time: 12/05/22  1:10 AM    Specimen: Blood   Result Value Ref Range    Sed Rate 40 (H) 0 - 30 mm/hr   Procalcitonin    Collection Time: 12/05/22  1:10 AM    Specimen: Blood   Result Value Ref Range    Procalcitonin 0.55 (H) 0.00 - 0.25 ng/mL   CBC Auto Differential    Collection Time: 12/05/22  1:10 AM    Specimen: Blood   Result Value Ref Range    WBC 29.90 (H) 3.40 - 10.80 10*3/mm3    RBC 2.48 (L) 3.77 - 5.28 10*6/mm3    Hemoglobin 7.4 (L) 12.0 - 15.9 g/dL    Hematocrit 22.7 (L) 34.0 - 46.6 %    MCV 91.4 79.0 - 97.0 fL    MCH 29.8 26.6 - 33.0 pg    MCHC 32.7 31.5 - 35.7 g/dL    RDW 14.8 12.3 - 15.4 %    RDW-SD 50.3 37.0 - 54.0 fl    MPV 10.4 6.0 - 12.0 fL    Platelets 250 140 - 450 10*3/mm3    Neutrophil % 88.3 (H) 42.7 - 76.0 %    Lymphocyte % 4.9 (L) 19.6 - 45.3 %    Monocyte % 6.4 5.0 - 12.0 %    Eosinophil % 0.2 (L) 0.3 - 6.2 %    Basophil % 0.2 0.0 - 1.5 %    Neutrophils, Absolute 26.40 (H) 1.70 - 7.00 10*3/mm3    Lymphocytes, Absolute 1.50 0.70 - 3.10 10*3/mm3    Monocytes, Absolute 1.90 (H) 0.10 - 0.90 10*3/mm3    Eosinophils, Absolute 0.00 0.00 - 0.40 10*3/mm3    Basophils, Absolute 0.10 0.00 - 0.20 10*3/mm3    nRBC 0.0 0.0 - 0.2 /100 WBC   Prepare RBC, 1 Units    Collection Time: 12/05/22  2:00 AM   Result Value Ref Range    Product Code Q7818T76     Unit Number [id number]     UNIT  ABO B     UNIT  RH POS     Crossmatch Interpretation Compatible     Dispense Status PT     Blood Expiration Date [id number]     Blood Type Barcode 7300    POC Glucose Once    Collection Time: 12/05/22  6:51 AM    Specimen: Blood   Result  Value Ref Range    Glucose 115 (H) 70 - 105 mg/dL   Prepare RBC, 1 Units    Collection Time: 12/05/22 10:32 AM   Result Value Ref Range    Product Code S7230H39     Unit Number [id number]     UNIT  ABO B     UNIT  RH NEG     Crossmatch Interpretation Compatible     Dispense Status IS     Blood Expiration Date [id number]     Blood Type Barcode 1700    POC Glucose Once    Collection Time: 12/05/22 12:13 PM    Specimen: Blood   Result Value Ref Range    Glucose 104 70 - 105 mg/dL       Radiology:  Pertinent radiology studies were reviewed as described above  Renal ultrasound as detailed above    Assessment:  New acute renal failure, secondary to contrast nephropathy and acute blood loss anemia, seems to be improving with improved urine output and falling waste products  Acute blood loss anemia  Contrast nephropathy  Rectus sheath hematoma, no need for IR embolization per IR, would be high risk regardless with her resolving renal failure  Altered mental status  Hypernatremia  Worsening anemia  CVA with aphasia  Hypotension on midodrine      Plan:  Renal function seems to be improving with improved urine output  No significant obstruction on renal ultrasound  Continue midodrine for BP support  Continue current IV fluids but decrease rate a bit to 100 cc/h  Follow-up a.m. labs  Avoid any potential nephrotoxins during renal recovery      Berto Davalos MD   [facility]   Office phone number: [phone]  Answering service phone number: [phone]    12/05/22  14:44 EST    Dictation performed using Dragon dictation software

## 2022-12-05 NOTE — PROGRESS NOTES
Daily Progress Note        Cerebrovascular accident (CVA), unspecified mechanism (HCC)    Aphasia    Tobacco abuse    Carotid stenosis, bilateral    Essential hypertension    Feeding difficulty      Assessment    Left lower lobe pneumonia questionable aspiration   cerebrovascular accident (CVA), unspecified mechanism (HCC)  Hypoxia  Bibasilar opacities: Atelectasis versus early pneumonia  Aphasia  KVNG  Spontaneous retrorectal hematoma  Acute anemia due to blood loss in the hematoma  History of tobacco smoking  Bilateral carotid stenosis  Essential hypertension      Recommendations:    oxygen supplement and titration to maintain saturation 90 to 95%  -currently requiring 12 L high flow    Antibiotics: Cefepime started 12/3/2022 and given 1 dose of vancomycin  monitor Neuro status and adjust antibiotic as needed    Bronchodilator  Blood pressure support: Midodrine  Monitor hemoglobin transfuse as needed for hemoglobin less than 7  Atorvastatin  Patient is followed by nephrology, general surgery  DVT/GI prophylaxis  Check daily labs and correct electrolytes as needed           LOS: 11 days     Subjective         Objective     Vital signs for last 24 hours:  Vitals:    12/05/22 0200 12/05/22 0230 12/05/22 0410 12/05/22 0530   BP:  139/47  155/49   BP Location:  Left arm  Left arm   Patient Position:  Lying  Lying   Pulse:  97  112   Resp: 22 18 24   Temp:  97.9 °F (36.6 °C)  98.2 °F (36.8 °C)   TempSrc:  Axillary  Axillary   SpO2:  98%     Weight:   95.1 kg (209 lb 10.5 oz)    Height:           Intake/Output last 3 shifts:  I/O last 3 completed shifts:  In: 396.3 [Blood:396.3]  Out: -   Intake/Output this shift:  I/O this shift:  In: 1030 [I.V.:1030]  Out: 1200 [Urine:1200]      Radiology  Imaging Results (Last 24 Hours)     Procedure Component Value Units Date/Time    CT Abdomen Pelvis Without Contrast [[id number]] Collected: 12/04/22 1748     Updated: 12/04/22 1819    Narrative:         DATE OF EXAM:  12/4/2022 5:10  PM     PROCEDURE:  CT ABDOMEN PELVIS WO CONTRAST-     INDICATIONS:  Left rectus sheath hematoma with a history of active bleeding,  follow-up.     COMPARISON:  12/03/2022.     TECHNIQUE:  Routine transaxial slices were obtained through the abdomen and pelvis  without the administration of intravenous contrast. Reconstructed  coronal and sagittal images were also obtained. Automated exposure  control and iterative construction methods were used.      FINDINGS:  There is a left rectus sheath hematoma with hemorrhage extending into  the left aspect of the pelvis displacing the urinary bladder and uterus  rightward. The pelvic hematoma is of similar size when compared to the  previous study. It measures approximately 15.1 cm in sagittal plane and  6.5 cm in the coronal plane on image 118 of series 5. The hemorrhage is  hyperdense consistent with clot. There are also hyperdense areas seen in  the left rectus sheath hematoma consistent with clots. There is no  contrast enhancement within the hematoma to indicate active bleeding on  today's study. There is more diffuse hemorrhage seen with throughout the  remaining pelvis slightly increased from the previous study. There is no  retroperitoneal hemorrhage. The gallbladder is distended with a  calcified stone. The liver, adrenal glands, pancreas, and spleen are  normal. The right kidney is small in size with chronic scarring. There  are bilateral renal cysts. There is a colonic diverticulosis without  evidence of acute diverticulitis. The appendix is normal. There are no  dilated loops of bowel to indicate an obstructive process. The tip of  the Dobbhoff feeding tube terminates in the body of the stomach. There  are atherosclerotic vascular calcifications throughout the abdomen and  pelvis. There has been interval development of a trace left pleural  effusion. There is left lower lobe airspace disease suspicious for  pneumonia. There also may be some patchy pneumonia in the  right lower  lobe. There are no suspicious osteolytic or sclerotic lesions within the  bony structures. There is slight grade 1 anterior spondylolisthesis of  L4 on L5 secondary to facet arthritis.       Impression:         1. Left rectus sheath hematoma with hemorrhage extending into the left  aspect of the pelvis. The pelvic hematoma is of similar size when  compared to previous study. The hemorrhage now demonstrate hyperdense  clot. There are also hyperdense clots seen within the left rectus sheath  hematoma. There is no contrast enhancement to indicate active bleeding.  2. There is now more diffuse hemorrhage seen within the pelvis slightly  increased from the previous exam.  3. No retroperitoneal hemorrhage.  4. Distended gallbladder with cholelithiasis.  5. Colonic diverticulosis without acute diverticulitis.  6. Interval development of a trace left basilar pleural effusion with  left lower lobe airspace disease suspicious for pneumonia. There also  may be patchy pneumonia in the right lower lobe.  7. Additional findings as noted above.     Electronically Signed By-Krystian Roberts MD On:12/4/2022 6:17 PM  This report was finalized on [id number] by  Krystian Roberts MD.          Labs:  Results from last 7 days   Lab Units 12/05/22  0110   WBC 10*3/mm3 29.90*   HEMOGLOBIN g/dL 7.4*   HEMATOCRIT % 22.7*   PLATELETS 10*3/mm3 250     Results from last 7 days   Lab Units 12/05/22  0110   SODIUM mmol/L 149*   POTASSIUM mmol/L 3.9   CHLORIDE mmol/L 113*   CO2 mmol/L 22.0   BUN mg/dL 85*   CREATININE mg/dL 2.28*   CALCIUM mg/dL 8.5*   BILIRUBIN mg/dL 0.3   ALK PHOS U/L 111   ALT (SGPT) U/L 49*   AST (SGOT) U/L 30   GLUCOSE mg/dL 130*     Results from last 7 days   Lab Units 12/03/22  1314   PH, ARTERIAL pH units 7.443   PO2 ART mm Hg 63.9*   PCO2, ARTERIAL mm Hg 36.6   HCO3 ART mmol/L 25.0     Results from last 7 days   Lab Units 12/05/22  0110 12/04/22  0605 12/03/22  0056   ALBUMIN g/dL 2.80* 2.90* 3.30*         Results  from last 7 days   Lab Units 11/30/22  1411   SARAH  Negative     Results from last 7 days   Lab Units 12/05/22  0110   MAGNESIUM mg/dL 2.6*     Results from last 7 days   Lab Units 12/03/22  0336   INR  1.05   APTT seconds 33.1*               Meds:   SCHEDULE  atorvastatin, 80 mg, Per G Tube, Nightly  bisacodyl, 10 mg, Rectal, Once  cefepime, 2 g, Intravenous, Q24H  insulin lispro, 2-7 Units, Subcutaneous, Q6H  ipratropium-albuterol, 3 mL, Nebulization, 4x Daily - RT  lidocaine, 1 patch, Transdermal, Q24H  midodrine, 10 mg, Nasogastric, TID AC  sertraline, 50 mg, Per G Tube, Daily  sodium chloride, 10 mL, Intravenous, Q12H      Infusions  sodium chloride, 150 mL/hr, Last Rate: 150 mL/hr (12/05/22 0453)      PRNs  •  bisacodyl  •  dextrose  •  dextrose  •  glucagon (human recombinant)  •  hydrALAZINE  •  labetalol  •  Morphine  •  ondansetron  •  sodium chloride  •  sodium chloride  •  sodium chloride  •  sodium chloride    Physical Exam:  Physical Exam  Pulmonary:      Breath sounds: Rhonchi and rales present.         ROS  Review of Systems   Unable to perform ROS: Mental status change       I have reviewed the patient's new clinical results.    Electronically signed by Naz Walls MD

## 2022-12-05 NOTE — PROGRESS NOTES
[facility] Medicine Services Daily Progress Note    Patient Name: Cheyenne Estrella  : 1950  MRN: [id number]  Primary Care Physician:  Melba Rosen APRN  Date of admission: 2022      Subjective      Chief Complaint: Severe aphasia     Patient somewhat restless.  Continues to grab at things and twisting around in the bed.  Oxygen requirement stable around 8 to 10 L high flow nasal cannula.  Blood pressure has been stable.    Hemoglobin trending down.  1 unit of blood ordered.  Discussed with family.      ROS negative except as above       Objective      Vitals:   Temp:  [97.9 °F (36.6 °C)-100 °F (37.8 °C)] 98.7 °F (37.1 °C)  Heart Rate:  [] 114  Resp:  [18-25] 22  BP: (118-155)/(42-96) 122/53  Flow (L/min):  [11-12] 12       Physical Exam  Vitals reviewed.   Constitutional:       Comments: Family at bedside  NG tube is in.  Patient appears restless but has much better color in her face today after blood.     HENT:      Head: Normocephalic.      Nose: Nose normal.      Mouth/Throat:      Mouth: Mucous membranes are moist.   Cardiovascular:      Rate and Rhythm: Normal rate and regular rhythm.      Pulses: Normal pulses.      Heart sounds: Normal heart sounds.   Pulmonary:      Effort: Pulmonary effort is normal.      Breath sounds: Normal breath sounds.   Abdominal:      General: Abdomen is flat.      Palpations: Abdomen is soft.   Musculoskeletal:         General: Normal range of motion.      Cervical back: Normal range of motion.   Skin:     General: Skin is warm.   Neurological:      General: No focal deficit present.      Mental Status: She is alert and oriented to person, place, and time.      Comments: Patient is aphasic   Psychiatric:         Mood and Affect: Mood normal.         Behavior: Behavior normal.        Result Review    Result Review:  I have personally reviewed the results from the time of this admission to 2022 14:56 EST and agree with these  findings:  [x]  Laboratory  []  Microbiology  []  Radiology  []  EKG/Telemetry   []  Cardiology/Vascular   []  Pathology  []  Old records  []  Other:  Most notable findings include: Hemoglobin 7.4    Assessment & Plan       Brief Patient Summary:  Cheyenne Estrella is a 72 y.o. female with PMHx of HTN, HLD, anxiety, CVA who presented to [facility] on 11/24/2022 complaining of aphasia.  Due to condition HPI obtained from family at bedside and available records.  LWK 1730. Patient started slurring speech on phone with family and EMS called.  Patient then became completely aphasic.  Denies chest pain, dyspnea, fevers, chills, numbness, tingling, or gait instability.  Presented to [facility] ED due to possibility of stroke     In the ED, vitals 98.3, HR 79, RR 24, /67, 91% RA.  Labs notable for troponin <0.01, glucose 110.  Stroke team called.  Pt outside window for Tpa.  CT head showed no acute abnormality but did show remote infarct in left basal ganglia extending into the left corona radiata along with small remote infarcts in the left thalamus and right basal ganglia.  CTA head/neck pending.  Patient to be admitted for neurological evaluation.             Active Hospital Problems:          Active Hospital Problems     Diagnosis     • **Cerebrovascular accident (CVA), unspecified mechanism (HCC)     • Carotid stenosis, bilateral     • Essential hypertension     • Aphasia     • Tobacco abuse        Plan:   Rectus sheath hematoma  Monitor H&H closely  Transfuse if less than 8.    Another unit of blood ordered December 5        Aphasia-secondary to underlying infarct, possibility of evolving state.  Patient was established history of previous CVA within the last year was still in the process of rehabbing.  Patient's granddaughter reports patient recently lost her spouse and had great deal of emotional stress, appears stable but not improving  -Neurology evaluated concern for progression or  worsening  -Repeat MRI showing new areas of acute infarct in right precentral gyrus cortex and within inferior right frontal lobe  -CTA showing 60% stenosis of right CCA 50% stenosis of left CCA but not actionable at this time  -ED evaluated not criteria for tPA due to questionable timeframe  -Antiplatelet  -Echo showing normal EF no discussion of shunting  -PT/OT/speech  -May need discussion of PEG tube, family aware and starting NG tube feeds  -B12 and thyroid function ordered  -Given underlying carotid disease vascular surgery consulted recommending medical management at this time can follow-up in clinic for further discussion  -Continue speech therapy  -Repeat CT of the head ordered for persistent lethargy.    No acute findings.  -Patient agreed to PEG tube placement and rehab on November 30.  GI consulted.  Will need to wait till Monday due to Plavix.  Patient seems less lethargic on December 1.  Sitting in the chair on December 2.  Patient lethargic on December 3 and 4 due to blood loss anemia but also restless at the same time     Leukocytosis-left-sided pneumonia on imaging.  Likely reactive as well        Hypertension/hyperlipidemia/anxiety-chronic in nature  -Allow for permissive hypertension  -Resume home medication as clinically appropriate     Acute renal failure  Likely due to hypotension due to hemorrhage  Nephrology consulted     Acute respiratory failure  ABG shows hypoxemia with PO2 in the 60s  Pulmonary consulted  Chest x-ray ordered shows left-sided consolidation/pneumonia.  Supplemental oxygen and pulmonary consult ordered     Persistent neck pain lidocaine patch ordered.  CT soft tissue neck negative       Palliative consulted per family request      DVT prophylaxis:  Mechanical DVT prophylaxis orders are present.     CODE STATUS:    Code Status (Patient has no pulse and is not breathing): CPR (Attempt to Resuscitate)  Medical Interventions (Patient has pulse or is breathing): Full  Support  Release to patient: Routine Release     FMLA paperwork filled out for patient's daughter     Disposition: Given severity of stroke uncertain discharge time     This patient has been examined wearing appropriate Personal Protective Equipment and discussed with hospital infection control department.     12/05/22      Electronically signed by Martin Cabello MD, 12/05/22, 14:56 EST.  [facility]ist Team

## 2022-12-05 NOTE — PLAN OF CARE
Goal Outcome Evaluation:     Attempted to treat today but patient declined. Patient was agitated and family reported that she has had a decline as she currently has pneumonia among other complications. ST will continue to follow if patient is agreeable.

## 2022-12-05 NOTE — PROGRESS NOTES
Patient with no significant change  I was able to have a very very detailed discussion with the patient's family    Dr. Cabello and the patient's nurse was also present  The patient's condition has worsened, after initially improving  There are other issues going on, retroperitoneal hemorrhage etc. so which stops us from giving the patient any anticoagulation and thus she can have any further events happen    She respond to painful stimuli  She is not moving the left side  She is aphasic    Family is considering to give her a few more days, because of her left-sided weakness, and her speech problem, if she does not improve dramatically, they are thinking of going to comfort measures but that decision has to come from them    I told him that I will be not available for the next few days and they understand    Call neurology team if needed

## 2022-12-06 PROBLEM — N14.11 CONTRAST-INDUCED NEPHROPATHY: Status: ACTIVE | Noted: 2022-12-06

## 2022-12-06 PROBLEM — J96.01 ACUTE HYPOXEMIC RESPIRATORY FAILURE: Status: ACTIVE | Noted: 2022-12-06

## 2022-12-06 PROBLEM — J18.9 CAP (COMMUNITY ACQUIRED PNEUMONIA): Status: ACTIVE | Noted: 2022-12-06

## 2022-12-06 PROBLEM — T50.8X5A CONTRAST-INDUCED NEPHROPATHY: Status: ACTIVE | Noted: 2022-12-06

## 2022-12-06 PROBLEM — I16.0 HYPERTENSIVE URGENCY: Status: RESOLVED | Noted: 2022-02-18 | Resolved: 2022-12-06

## 2022-12-06 LAB
ALBUMIN SERPL-MCNC: 3.1 G/DL (ref 3.5–5.2)
ALBUMIN/GLOB SERPL: 0.9 G/DL
ALP SERPL-CCNC: 125 U/L (ref 39–117)
ALT SERPL W P-5'-P-CCNC: 96 U/L (ref 1–33)
ANION GAP SERPL CALCULATED.3IONS-SCNC: 11 MMOL/L (ref 5–15)
ANISOCYTOSIS BLD QL: ABNORMAL
AST SERPL-CCNC: 109 U/L (ref 1–32)
BH BB BLOOD EXPIRATION DATE: NORMAL
BH BB BLOOD TYPE BARCODE: 1700
BH BB DISPENSE STATUS: NORMAL
BH BB PRODUCT CODE: NORMAL
BH BB UNIT NUMBER: NORMAL
BILIRUB SERPL-MCNC: 0.5 MG/DL (ref 0–1.2)
BUN SERPL-MCNC: 56 MG/DL (ref 8–23)
BUN/CREAT SERPL: 42.4 (ref 7–25)
CALCIUM SPEC-SCNC: 9.1 MG/DL (ref 8.6–10.5)
CHLORIDE SERPL-SCNC: 119 MMOL/L (ref 98–107)
CO2 SERPL-SCNC: 23 MMOL/L (ref 22–29)
CREAT SERPL-MCNC: 1.32 MG/DL (ref 0.57–1)
CROSSMATCH INTERPRETATION: NORMAL
DEPRECATED RDW RBC AUTO: 52.5 FL (ref 37–54)
EGFRCR SERPLBLD CKD-EPI 2021: 42.7 ML/MIN/1.73
ERYTHROCYTE [DISTWIDTH] IN BLOOD BY AUTOMATED COUNT: 16.1 % (ref 12.3–15.4)
GLOBULIN UR ELPH-MCNC: 3.4 GM/DL
GLUCOSE BLDC GLUCOMTR-MCNC: 147 MG/DL (ref 70–105)
GLUCOSE BLDC GLUCOMTR-MCNC: 152 MG/DL (ref 70–105)
GLUCOSE BLDC GLUCOMTR-MCNC: 156 MG/DL (ref 70–105)
GLUCOSE SERPL-MCNC: 153 MG/DL (ref 65–99)
HCT VFR BLD AUTO: 30 % (ref 34–46.6)
HGB BLD-MCNC: 9.4 G/DL (ref 12–15.9)
LYMPHOCYTES # BLD MANUAL: 2.84 10*3/MM3 (ref 0.7–3.1)
LYMPHOCYTES NFR BLD MANUAL: 1 % (ref 5–12)
MAGNESIUM SERPL-MCNC: 2.6 MG/DL (ref 1.6–2.4)
MCH RBC QN AUTO: 28.7 PG (ref 26.6–33)
MCHC RBC AUTO-ENTMCNC: 31.2 G/DL (ref 31.5–35.7)
MCV RBC AUTO: 91.9 FL (ref 79–97)
MONOCYTES # BLD: 0.32 10*3/MM3 (ref 0.1–0.9)
NEUTROPHILS # BLD AUTO: 28.35 10*3/MM3 (ref 1.7–7)
NEUTROPHILS NFR BLD MANUAL: 74 % (ref 42.7–76)
NEUTS BAND NFR BLD MANUAL: 16 % (ref 0–5)
PHOSPHATE SERPL-MCNC: 2.6 MG/DL (ref 2.5–4.5)
PLAT MORPH BLD: NORMAL
PLATELET # BLD AUTO: 310 10*3/MM3 (ref 140–450)
PMV BLD AUTO: 10.1 FL (ref 6–12)
POTASSIUM SERPL-SCNC: 3.5 MMOL/L (ref 3.5–5.2)
PROT SERPL-MCNC: 6.5 G/DL (ref 6–8.5)
RBC # BLD AUTO: 3.27 10*6/MM3 (ref 3.77–5.28)
SCAN SLIDE: NORMAL
SODIUM SERPL-SCNC: 153 MMOL/L (ref 136–145)
TOXIC GRANULATION: ABNORMAL
UNIT  ABO: NORMAL
UNIT  RH: NORMAL
VARIANT LYMPHS NFR BLD MANUAL: 9 % (ref 19.6–45.3)
WBC NRBC COR # BLD: 31.5 10*3/MM3 (ref 3.4–10.8)

## 2022-12-06 PROCEDURE — 82962 GLUCOSE BLOOD TEST: CPT

## 2022-12-06 PROCEDURE — 80053 COMPREHEN METABOLIC PANEL: CPT | Performed by: NURSE PRACTITIONER

## 2022-12-06 PROCEDURE — 99231 SBSQ HOSP IP/OBS SF/LOW 25: CPT | Performed by: STUDENT IN AN ORGANIZED HEALTH CARE EDUCATION/TRAINING PROGRAM

## 2022-12-06 PROCEDURE — 85007 BL SMEAR W/DIFF WBC COUNT: CPT | Performed by: NURSE PRACTITIONER

## 2022-12-06 PROCEDURE — 83735 ASSAY OF MAGNESIUM: CPT | Performed by: INTERNAL MEDICINE

## 2022-12-06 PROCEDURE — 85025 COMPLETE CBC W/AUTO DIFF WBC: CPT | Performed by: NURSE PRACTITIONER

## 2022-12-06 PROCEDURE — 25010000002 CEFEPIME PER 500 MG: Performed by: INTERNAL MEDICINE

## 2022-12-06 PROCEDURE — 63710000001 INSULIN LISPRO (HUMAN) PER 5 UNITS: Performed by: NURSE PRACTITIONER

## 2022-12-06 PROCEDURE — 94799 UNLISTED PULMONARY SVC/PX: CPT

## 2022-12-06 PROCEDURE — 94761 N-INVAS EAR/PLS OXIMETRY MLT: CPT

## 2022-12-06 PROCEDURE — 84100 ASSAY OF PHOSPHORUS: CPT | Performed by: INTERNAL MEDICINE

## 2022-12-06 PROCEDURE — 94664 DEMO&/EVAL PT USE INHALER: CPT

## 2022-12-06 PROCEDURE — 25010000002 MORPHINE PER 10 MG: Performed by: INTERNAL MEDICINE

## 2022-12-06 RX ORDER — LORAZEPAM 0.5 MG/1
0.5 TABLET ORAL EVERY 6 HOURS PRN
Status: DISCONTINUED | OUTPATIENT
Start: 2022-12-06 | End: 2022-12-09 | Stop reason: HOSPADM

## 2022-12-06 RX ADMIN — ATORVASTATIN CALCIUM 80 MG: 40 TABLET, FILM COATED ORAL at 20:23

## 2022-12-06 RX ADMIN — CEFEPIME 2 G: 2 INJECTION, POWDER, FOR SOLUTION INTRAVENOUS at 17:10

## 2022-12-06 RX ADMIN — LORAZEPAM 0.5 MG: 0.5 TABLET ORAL at 20:23

## 2022-12-06 RX ADMIN — SODIUM CHLORIDE 100 ML/HR: 4.5 INJECTION, SOLUTION INTRAVENOUS at 05:57

## 2022-12-06 RX ADMIN — Medication 10 ML: at 20:24

## 2022-12-06 RX ADMIN — Medication 10 ML: at 08:06

## 2022-12-06 RX ADMIN — IPRATROPIUM BROMIDE AND ALBUTEROL SULFATE 3 ML: 2.5; .5 SOLUTION RESPIRATORY (INHALATION) at 11:38

## 2022-12-06 RX ADMIN — MORPHINE SULFATE 1 MG: 2 INJECTION, SOLUTION INTRAMUSCULAR; INTRAVENOUS at 10:45

## 2022-12-06 RX ADMIN — MORPHINE SULFATE 1 MG: 2 INJECTION, SOLUTION INTRAMUSCULAR; INTRAVENOUS at 05:56

## 2022-12-06 RX ADMIN — IPRATROPIUM BROMIDE AND ALBUTEROL SULFATE 3 ML: 2.5; .5 SOLUTION RESPIRATORY (INHALATION) at 16:20

## 2022-12-06 RX ADMIN — LIDOCAINE 1 PATCH: 50 PATCH CUTANEOUS at 08:01

## 2022-12-06 RX ADMIN — SERTRALINE 50 MG: 50 TABLET, FILM COATED ORAL at 08:10

## 2022-12-06 RX ADMIN — INSULIN LISPRO 2 UNITS: 100 INJECTION, SOLUTION INTRAVENOUS; SUBCUTANEOUS at 06:17

## 2022-12-06 RX ADMIN — IPRATROPIUM BROMIDE AND ALBUTEROL SULFATE 3 ML: 2.5; .5 SOLUTION RESPIRATORY (INHALATION) at 07:35

## 2022-12-06 NOTE — PLAN OF CARE
Goal Outcome Evaluation:                 Problem: Adult Inpatient Plan of Care  Goal: Plan of Care Review  Outcome: Ongoing, Progressing  Goal: Patient-Specific Goal (Individualized)  Outcome: Ongoing, Progressing  Goal: Absence of Hospital-Acquired Illness or Injury  Outcome: Ongoing, Progressing  Intervention: Identify and Manage Fall Risk  Recent Flowsheet Documentation  Taken 12/6/2022 1800 by Radha Bolivar RN  Safety Promotion/Fall Prevention:   activity supervised   assistive device/personal items within reach   clutter free environment maintained   safety round/check completed   room organization consistent   nonskid shoes/slippers when out of bed   lighting adjusted   fall prevention program maintained  Taken 12/6/2022 1600 by Radha Bolivar RN  Safety Promotion/Fall Prevention:   activity supervised   assistive device/personal items within reach   clutter free environment maintained   safety round/check completed   room organization consistent   nonskid shoes/slippers when out of bed   lighting adjusted   fall prevention program maintained  Taken 12/6/2022 1400 by Radha Bolivar RN  Safety Promotion/Fall Prevention:   activity supervised   assistive device/personal items within reach   clutter free environment maintained   safety round/check completed   room organization consistent   nonskid shoes/slippers when out of bed   lighting adjusted   fall prevention program maintained  Taken 12/6/2022 1200 by Radha Bolivar RN  Safety Promotion/Fall Prevention:   activity supervised   assistive device/personal items within reach   clutter free environment maintained   safety round/check completed   room organization consistent   nonskid shoes/slippers when out of bed   lighting adjusted   fall prevention program maintained  Taken 12/6/2022 1000 by Radha Bolivar RN  Safety Promotion/Fall Prevention:   activity supervised   assistive device/personal items within reach   clutter free environment  maintained   safety round/check completed   room organization consistent   nonskid shoes/slippers when out of bed   lighting adjusted   fall prevention program maintained  Taken 12/6/2022 0800 by Radha Bolivar RN  Safety Promotion/Fall Prevention:   activity supervised   assistive device/personal items within reach   clutter free environment maintained   safety round/check completed   room organization consistent   nonskid shoes/slippers when out of bed   lighting adjusted   fall prevention program maintained  Intervention: Prevent Skin Injury  Recent Flowsheet Documentation  Taken 12/6/2022 1138 by Radha Bolivar RN  Skin Protection: adhesive use limited  Taken 12/6/2022 0800 by Radha Bolivar RN  Skin Protection: adhesive use limited  Intervention: Prevent and Manage VTE (Venous Thromboembolism) Risk  Recent Flowsheet Documentation  Taken 12/6/2022 1138 by Radha Bolivar RN  VTE Prevention/Management:   bilateral   sequential compression devices on  Taken 12/6/2022 0800 by Radha Bolivar RN  VTE Prevention/Management:   bilateral   sequential compression devices off   patient refused intervention  Intervention: Prevent Infection  Recent Flowsheet Documentation  Taken 12/6/2022 1800 by Radha Bolivar RN  Infection Prevention:   single patient room provided   rest/sleep promoted   personal protective equipment utilized   hand hygiene promoted   equipment surfaces disinfected  Taken 12/6/2022 1600 by Radha Bolivar RN  Infection Prevention:   single patient room provided   rest/sleep promoted   personal protective equipment utilized   hand hygiene promoted  Taken 12/6/2022 1400 by Radha Bolivar RN  Infection Prevention:   single patient room provided   rest/sleep promoted   personal protective equipment utilized   hand hygiene promoted  Taken 12/6/2022 1200 by Radha Bolivar RN  Infection Prevention:   single patient room provided   rest/sleep promoted   personal protective equipment  utilized   hand hygiene promoted  Taken 12/6/2022 1000 by Radha Bolivar RN  Infection Prevention:   single patient room provided   rest/sleep promoted   personal protective equipment utilized   hand hygiene promoted   equipment surfaces disinfected  Taken 12/6/2022 0800 by Radha Bolivar RN  Infection Prevention:   single patient room provided   rest/sleep promoted   personal protective equipment utilized   hand hygiene promoted   equipment surfaces disinfected  Goal: Optimal Comfort and Wellbeing  Outcome: Ongoing, Progressing  Intervention: Monitor Pain and Promote Comfort  Recent Flowsheet Documentation  Taken 12/6/2022 1138 by Radha Bolivar RN  Pain Management Interventions:   care clustered   see MAR  Intervention: Provide Person-Centered Care  Recent Flowsheet Documentation  Taken 12/6/2022 1524 by Radha oBlivar RN  Trust Relationship/Rapport:   care explained   choices provided  Taken 12/6/2022 1138 by Radha Bolivar RN  Trust Relationship/Rapport:   care explained   choices provided  Taken 12/6/2022 0800 by Radha Bolivar RN  Trust Relationship/Rapport:   care explained   choices provided  Goal: Readiness for Transition of Care  Outcome: Ongoing, Progressing     Problem: Adjustment to Illness (Stroke, Ischemic/Transient Ischemic Attack)  Goal: Optimal Coping  Outcome: Ongoing, Progressing  Intervention: Support Psychosocial Response to Stroke  Recent Flowsheet Documentation  Taken 12/6/2022 1524 by Radha Bolivar RN  Family/Support System Care: support provided  Taken 12/6/2022 1138 by Radha Bolivar RN  Supportive Measures: active listening utilized  Family/Support System Care: support provided  Taken 12/6/2022 0800 by Radha Bolivar RN  Supportive Measures: active listening utilized  Family/Support System Care: support provided     Problem: Bowel Elimination Impaired (Stroke, Ischemic/Transient Ischemic Attack)  Goal: Effective Bowel Elimination  Outcome: Ongoing,  Progressing     Problem: Cerebral Tissue Perfusion (Stroke, Ischemic/Transient Ischemic Attack)  Goal: Optimal Cerebral Tissue Perfusion  Outcome: Ongoing, Progressing  Intervention: Protect and Optimize Cerebral Perfusion  Recent Flowsheet Documentation  Taken 12/6/2022 1524 by Radha Bolivar RN  Cerebral Perfusion Promotion: blood pressure monitored  Taken 12/6/2022 1138 by Radha Bolivar RN  Cerebral Perfusion Promotion: blood pressure monitored  Taken 12/6/2022 0800 by Radha Bolivar RN  Sensory Stimulation Regulation: television on  Cerebral Perfusion Promotion: blood pressure monitored     Problem: Cognitive Impairment (Stroke, Ischemic/Transient Ischemic Attack)  Goal: Optimal Cognitive Function  Outcome: Ongoing, Progressing  Intervention: Optimize Cognitive Function  Recent Flowsheet Documentation  Taken 12/6/2022 1138 by Radha Bolivar RN  Environment Familiarity/Consistency: daily routine followed  Taken 12/6/2022 0800 by Radha Bolivar RN  Sensory Stimulation Regulation: television on  Reorientation Measures:   clock in view   familiar social contact encouraged  Environment Familiarity/Consistency: daily routine followed     Problem: Communication Impairment (Stroke, Ischemic/Transient Ischemic Attack)  Goal: Improved Communication Skills  Outcome: Ongoing, Progressing  Intervention: Optimize Communication Skills  Recent Flowsheet Documentation  Taken 12/6/2022 0800 by Radha Bolivar RN  Communication Enhancement Strategies: verbal and visual cues paired     Problem: Functional Ability Impaired (Stroke, Ischemic/Transient Ischemic Attack)  Goal: Optimal Functional Ability  Outcome: Ongoing, Progressing     Problem: Respiratory Compromise (Stroke, Ischemic/Transient Ischemic Attack)  Goal: Effective Oxygenation and Ventilation  Outcome: Ongoing, Progressing     Problem: Sensorimotor Impairment (Stroke, Ischemic/Transient Ischemic Attack)  Goal: Improved Sensorimotor Function  Outcome:  Ongoing, Progressing  Intervention: Optimize Range of Motion, Motor Control and Function  Recent Flowsheet Documentation  Taken 12/6/2022 0800 by Radha Bolivar RN  Spasticity Management: positioned with supportive device  Intervention: Optimize Sensory and Perceptual Ability  Recent Flowsheet Documentation  Taken 12/6/2022 1138 by Radha Bolivar RN  Pressure Reduction Techniques: weight shift assistance provided  Pressure Reduction Devices: pressure-redistributing mattress utilized  Taken 12/6/2022 0800 by Radha Bolivar RN  Pressure Reduction Techniques: weight shift assistance provided  Pressure Reduction Devices: pressure-redistributing mattress utilized     Problem: Swallowing Impairment (Stroke, Ischemic/Transient Ischemic Attack)  Goal: Optimal Eating and Swallowing without Aspiration  Outcome: Ongoing, Progressing     Problem: Urinary Elimination Impaired (Stroke, Ischemic/Transient Ischemic Attack)  Goal: Effective Urinary Elimination  Outcome: Ongoing, Progressing  Intervention: Promote Effective Bladder Elimination  Recent Flowsheet Documentation  Taken 12/6/2022 1524 by Radha Bolivar RN  Urinary Elimination Promotion: absorbent pad/diaper use encouraged  Taken 12/6/2022 1138 by Radha Bolivar RN  Urinary Elimination Promotion: absorbent pad/diaper use encouraged  Taken 12/6/2022 0800 by Radha Bolivar RN  Urinary Elimination Promotion: absorbent pad/diaper use encouraged     Problem: Asthma Comorbidity  Goal: Maintenance of Asthma Control  Outcome: Ongoing, Progressing  Intervention: Maintain Asthma Symptom Control  Recent Flowsheet Documentation  Taken 12/6/2022 1800 by Radha Bolivar RN  Medication Review/Management: medications reviewed  Taken 12/6/2022 1600 by Radha Bolivar RN  Medication Review/Management: medications reviewed  Taken 12/6/2022 1400 by Radha Bolivar RN  Medication Review/Management: medications reviewed  Taken 12/6/2022 1200 by Radha Bolivar  RN  Medication Review/Management: medications reviewed  Taken 12/6/2022 1000 by Radha Bolivar RN  Medication Review/Management: medications reviewed  Taken 12/6/2022 0800 by Radha Bolivar RN  Medication Review/Management: medications reviewed     Problem: Behavioral Health Comorbidity  Goal: Maintenance of Behavioral Health Symptom Control  Outcome: Ongoing, Progressing  Intervention: Maintain Behavioral Health Symptom Control  Recent Flowsheet Documentation  Taken 12/6/2022 1800 by Radha Bolivar RN  Medication Review/Management: medications reviewed  Taken 12/6/2022 1600 by Radha Bolivar RN  Medication Review/Management: medications reviewed  Taken 12/6/2022 1400 by Radha Bolivar RN  Medication Review/Management: medications reviewed  Taken 12/6/2022 1200 by Radha Bolivar RN  Medication Review/Management: medications reviewed  Taken 12/6/2022 1000 by Radha Bolivar RN  Medication Review/Management: medications reviewed  Taken 12/6/2022 0800 by Radha Bolivar RN  Medication Review/Management: medications reviewed     Problem: COPD (Chronic Obstructive Pulmonary Disease) Comorbidity  Goal: Maintenance of COPD Symptom Control  Outcome: Ongoing, Progressing  Intervention: Maintain COPD-Symptom Control  Recent Flowsheet Documentation  Taken 12/6/2022 1800 by Radha Bolivar RN  Medication Review/Management: medications reviewed  Taken 12/6/2022 1600 by Radha Bolivar RN  Medication Review/Management: medications reviewed  Taken 12/6/2022 1400 by Radha Bolivar RN  Medication Review/Management: medications reviewed  Taken 12/6/2022 1200 by Radha Bolivar RN  Medication Review/Management: medications reviewed  Taken 12/6/2022 1138 by Radha Bolivar RN  Supportive Measures: active listening utilized  Taken 12/6/2022 1000 by Radha Bolivar RN  Medication Review/Management: medications reviewed  Taken 12/6/2022 0800 by Radha Bolivar RN  Supportive Measures: active listening  utilized  Medication Review/Management: medications reviewed     Problem: Diabetes Comorbidity  Goal: Blood Glucose Level Within Targeted Range  Outcome: Ongoing, Progressing     Problem: Heart Failure Comorbidity  Goal: Maintenance of Heart Failure Symptom Control  Outcome: Ongoing, Progressing  Intervention: Maintain Heart Failure-Management  Recent Flowsheet Documentation  Taken 12/6/2022 1800 by Radha Bolivar RN  Medication Review/Management: medications reviewed  Taken 12/6/2022 1600 by Radha Bolivar RN  Medication Review/Management: medications reviewed  Taken 12/6/2022 1400 by Radha Bolivar RN  Medication Review/Management: medications reviewed  Taken 12/6/2022 1200 by Radha Bolivar RN  Medication Review/Management: medications reviewed  Taken 12/6/2022 1000 by Radha Bolivar RN  Medication Review/Management: medications reviewed  Taken 12/6/2022 0800 by Radha Bolivar RN  Medication Review/Management: medications reviewed     Problem: Hypertension Comorbidity  Goal: Blood Pressure in Desired Range  Outcome: Ongoing, Progressing  Intervention: Maintain Blood Pressure Management  Recent Flowsheet Documentation  Taken 12/6/2022 1800 by Radha Bolivar RN  Medication Review/Management: medications reviewed  Taken 12/6/2022 1600 by Radha Bolivar RN  Medication Review/Management: medications reviewed  Taken 12/6/2022 1400 by Radha Bolivar RN  Medication Review/Management: medications reviewed  Taken 12/6/2022 1200 by Radha Bolivar RN  Medication Review/Management: medications reviewed  Taken 12/6/2022 1000 by Radha Bolivar RN  Medication Review/Management: medications reviewed  Taken 12/6/2022 0800 by Radha Bolivar RN  Syncope Management: position changed slowly  Medication Review/Management: medications reviewed     Problem: Obstructive Sleep Apnea Risk or Actual Comorbidity Management  Goal: Unobstructed Breathing During Sleep  Outcome: Ongoing, Progressing      Problem: Osteoarthritis Comorbidity  Goal: Maintenance of Osteoarthritis Symptom Control  Outcome: Ongoing, Progressing  Intervention: Maintain Osteoarthritis Symptom Control  Recent Flowsheet Documentation  Taken 12/6/2022 1800 by Radha Bolivar RN  Medication Review/Management: medications reviewed  Taken 12/6/2022 1600 by Radha Bolivar RN  Medication Review/Management: medications reviewed  Taken 12/6/2022 1400 by Radha Bolivar RN  Medication Review/Management: medications reviewed  Taken 12/6/2022 1200 by Radha Bolivar RN  Medication Review/Management: medications reviewed  Taken 12/6/2022 1000 by Radha Bolivar RN  Medication Review/Management: medications reviewed  Taken 12/6/2022 0800 by Radha Bolivar RN  Medication Review/Management: medications reviewed     Problem: Pain Chronic (Persistent) (Comorbidity Management)  Goal: Acceptable Pain Control and Functional Ability  Outcome: Ongoing, Progressing  Intervention: Manage Persistent Pain  Recent Flowsheet Documentation  Taken 12/6/2022 1800 by Radha Bolivar RN  Medication Review/Management: medications reviewed  Taken 12/6/2022 1600 by Radha Bolivar RN  Medication Review/Management: medications reviewed  Taken 12/6/2022 1524 by Radha Bolivar RN  Sleep/Rest Enhancement: awakenings minimized  Taken 12/6/2022 1400 by Radha Bolivar RN  Medication Review/Management: medications reviewed  Taken 12/6/2022 1200 by Radha Bolivar RN  Medication Review/Management: medications reviewed  Taken 12/6/2022 1138 by Radha Bolivar RN  Sleep/Rest Enhancement: awakenings minimized  Taken 12/6/2022 1000 by Radha Bolivar RN  Medication Review/Management: medications reviewed  Taken 12/6/2022 0800 by Radha Bolivar RN  Sleep/Rest Enhancement: awakenings minimized  Medication Review/Management: medications reviewed  Intervention: Develop Pain Management Plan  Recent Flowsheet Documentation  Taken 12/6/2022 1138 by Osmel  JOHNNA Garcia  Pain Management Interventions:   care clustered   see MAR  Intervention: Optimize Psychosocial Wellbeing  Recent Flowsheet Documentation  Taken 12/6/2022 1740 by Radha Bolivar RN  Diversional Activities: television  Taken 12/6/2022 1524 by Radha Bolivar RN  Family/Support System Care: support provided  Taken 12/6/2022 1138 by Radha Bolivar RN  Supportive Measures: active listening utilized  Family/Support System Care: support provided  Taken 12/6/2022 1000 by Radha Bolivar RN  Diversional Activities: television  Taken 12/6/2022 0800 by Radha Bolivar RN  Supportive Measures: active listening utilized  Diversional Activities: television  Family/Support System Care: support provided     Problem: Seizure Disorder Comorbidity  Goal: Maintenance of Seizure Control  Outcome: Ongoing, Progressing     Problem: Skin Injury Risk Increased  Goal: Skin Health and Integrity  Outcome: Ongoing, Progressing  Intervention: Optimize Skin Protection  Recent Flowsheet Documentation  Taken 12/6/2022 1138 by Radha Bolivar RN  Pressure Reduction Techniques: weight shift assistance provided  Pressure Reduction Devices: pressure-redistributing mattress utilized  Skin Protection: adhesive use limited  Taken 12/6/2022 0800 by Radha Bolivar RN  Pressure Reduction Techniques: weight shift assistance provided  Pressure Reduction Devices: pressure-redistributing mattress utilized  Skin Protection: adhesive use limited     Problem: Fall Injury Risk  Goal: Absence of Fall and Fall-Related Injury  Outcome: Ongoing, Progressing  Intervention: Identify and Manage Contributors  Recent Flowsheet Documentation  Taken 12/6/2022 1800 by Radha Bolivar RN  Medication Review/Management: medications reviewed  Taken 12/6/2022 1600 by Radha Bolivar RN  Medication Review/Management: medications reviewed  Taken 12/6/2022 1400 by Radha Bolivar RN  Medication Review/Management: medications reviewed  Taken 12/6/2022  1200 by Radha Bolivar RN  Medication Review/Management: medications reviewed  Taken 12/6/2022 1000 by Radha Bolivar RN  Medication Review/Management: medications reviewed  Taken 12/6/2022 0800 by Radha Bolivar RN  Medication Review/Management: medications reviewed  Intervention: Promote Injury-Free Environment  Recent Flowsheet Documentation  Taken 12/6/2022 1800 by Radha Bolivar RN  Safety Promotion/Fall Prevention:   activity supervised   assistive device/personal items within reach   clutter free environment maintained   safety round/check completed   room organization consistent   nonskid shoes/slippers when out of bed   lighting adjusted   fall prevention program maintained  Taken 12/6/2022 1600 by Radha Bolivar RN  Safety Promotion/Fall Prevention:   activity supervised   assistive device/personal items within reach   clutter free environment maintained   safety round/check completed   room organization consistent   nonskid shoes/slippers when out of bed   lighting adjusted   fall prevention program maintained  Taken 12/6/2022 1400 by Radha Bolivar RN  Safety Promotion/Fall Prevention:   activity supervised   assistive device/personal items within reach   clutter free environment maintained   safety round/check completed   room organization consistent   nonskid shoes/slippers when out of bed   lighting adjusted   fall prevention program maintained  Taken 12/6/2022 1200 by Rahda Bolivar RN  Safety Promotion/Fall Prevention:   activity supervised   assistive device/personal items within reach   clutter free environment maintained   safety round/check completed   room organization consistent   nonskid shoes/slippers when out of bed   lighting adjusted   fall prevention program maintained  Taken 12/6/2022 1000 by Radha Bolivar RN  Safety Promotion/Fall Prevention:   activity supervised   assistive device/personal items within reach   clutter free environment maintained   safety  round/check completed   room organization consistent   nonskid shoes/slippers when out of bed   lighting adjusted   fall prevention program maintained  Taken 12/6/2022 0800 by Radha Bolivar RN  Safety Promotion/Fall Prevention:   activity supervised   assistive device/personal items within reach   clutter free environment maintained   safety round/check completed   room organization consistent   nonskid shoes/slippers when out of bed   lighting adjusted   fall prevention program maintained     Problem: Aspiration (Enteral Nutrition)  Goal: Absence of Aspiration Signs and Symptoms  Outcome: Ongoing, Progressing     Problem: Device-Related Complication Risk (Enteral Nutrition)  Goal: Safe, Effective Therapy Delivery  Outcome: Ongoing, Progressing     Problem: Feeding Intolerance (Enteral Nutrition)  Goal: Feeding Tolerance  Outcome: Ongoing, Progressing     Problem: Restraint, Nonbehavioral (Nonviolent)  Goal: Absence of Harm or Injury  Outcome: Ongoing, Progressing  Intervention: Implement Least Restrictive Safety Strategies  Recent Flowsheet Documentation  Taken 12/6/2022 1800 by Radha Bolivar RN  Medical Device Protection:   tubing secured   IV pole/bag removed from visual field  Taken 12/6/2022 1740 by Radha Bolivar RN  Medical Device Protection:   tubing secured   IV pole/bag removed from visual field  Less Restrictive Alternative:   bed alarm in use   calming techniques promoted  De-Escalation Techniques:   stimulation decreased   quiet time facilitated  Diversional Activities: television  Taken 12/6/2022 1600 by Radha Bolivar RN  Medical Device Protection: tubing secured  Taken 12/6/2022 1400 by Radha Bolivar RN  Medical Device Protection: tubing secured  Taken 12/6/2022 1200 by Radha Bolivar RN  Medical Device Protection: tubing secured  Taken 12/6/2022 1000 by Radha Bolivar RN  Medical Device Protection: tubing secured  Less Restrictive Alternative:   bed alarm in use   emotional  support provided  De-Escalation Techniques:   reoriented   stimulation decreased   family involvement requested  Diversional Activities: television  Taken 12/6/2022 0800 by Radha Bolivar RN  Medical Device Protection: tubing secured  Less Restrictive Alternative:   bed alarm in use   calming techniques promoted  De-Escalation Techniques:   reoriented   stimulation decreased  Diversional Activities: television  Intervention: Protect Dignity, Rights, and Personal Wellbeing  Recent Flowsheet Documentation  Taken 12/6/2022 1524 by Radha Bolivar RN  Trust Relationship/Rapport:   care explained   choices provided  Taken 12/6/2022 1138 by Radha Bolivar RN  Trust Relationship/Rapport:   care explained   choices provided  Taken 12/6/2022 0800 by Radha Bolivar RN  Trust Relationship/Rapport:   care explained   choices provided

## 2022-12-06 NOTE — PROGRESS NOTES
Daily Progress Note        Cerebrovascular accident (CVA), unspecified mechanism (HCC)    Aphasia    Tobacco abuse    Carotid stenosis, bilateral    Essential hypertension    Feeding difficulty      Assessment    Left lower lobe pneumonia questionable aspiration   cerebrovascular accident (CVA), unspecified mechanism (HCC)  Hypoxia  Bibasilar opacities: Atelectasis versus early pneumonia  Aphasia  KVNG  Spontaneous retrorectal hematoma  Acute anemia due to blood loss in the hematoma  History of tobacco smoking  Bilateral carotid stenosis  Essential hypertension      Recommendations:    oxygen supplement and titration to maintain saturation 90 to 95%  -currently requiring 11 L high flow    Antibiotics: Cefepime started 12/3/2022 and given 1 dose of vancomycin  monitor Neuro status and adjust antibiotic as needed    Aspiration precautions, elevate head of bed    Bronchodilator  Blood pressure support: Midodrine  Atorvastatin  DVT/GI prophylaxis  Check daily labs and correct electrolytes as needed    Monitor hemoglobin transfuse as needed for hemoglobin less than 7           LOS: 12 days     Subjective         Objective     Vital signs for last 24 hours:  Vitals:    12/05/22 1708 12/05/22 2300 12/06/22 0310 12/06/22 0517   BP: 132/55 110/77 168/90 (!) 191/62   BP Location: Left arm Left arm Left arm Left arm   Patient Position: Lying Lying Lying Lying   Pulse: 115 92 106 117   Resp: 25 23 23 24   Temp: 100.2 °F (37.9 °C) 100 °F (37.8 °C) 98.1 °F (36.7 °C) 98.3 °F (36.8 °C)   TempSrc: Axillary Axillary Axillary Axillary   SpO2: 92%  96% 94%   Weight:       Height:           Intake/Output last 3 shifts:  I/O last 3 completed shifts:  In: 1452.9 [I.V.:1030; Blood:422.9]  Out: 2150 [Urine:2150]  Intake/Output this shift:  I/O this shift:  In: 0   Out: 1300 [Urine:1300]      Radiology  Imaging Results (Last 24 Hours)     Procedure Component Value Units Date/Time    US Renal Bilateral [[id number]] Collected: 12/05/22 0836      Updated: 12/05/22 0845    Narrative:      DATE OF EXAM:  12/5/2022 7:48 AM     PROCEDURE:  US RENAL BILATERAL-     INDICATIONS:  evaluate for left sided hydronephrosis from ureteral compression from  hematoma; I63.9-Cerebral infarction, unspecified; R63.30-Feeding  difficulties, unspecified     COMPARISON:  CT abdomen and pelvis 12/04/2022      TECHNIQUE:   Grayscale and color Doppler ultrasound evaluation of the kidneys and  urinary bladder was performed.     FINDINGS:  The right kidney measures 8.4 x 4.7 x 4.5 cm. There are areas of  cortical thinning/scarring at the right kidney. Cyst at the mid right  kidney measures 1.7 cm. Hematoma in the left lower pelvis partially  imaged better assessed on CT. The left kidney measures 12.7 x 5.6 x 8  cm. Left upper pole renal cyst better assessed on CT. Negative for  hydronephrosis on the right. Mild left hydronephrosis and pelviectasis,  unchanged, limited sonographic window partially limits left kidney  assessment.       Impression:      1. Mild left renal pelviectasis and mild hydronephrosis similar to prior  CT exam.  2. Cortical thinning/scarring at the right kidney. No hydronephrosis on  the right.  3. Left lower pelvic hematoma partially imaged.     Electronically Signed By-Surendra Lin MD On:12/5/2022 8:43 AM  This report was finalized on [id number] by  Surendra Lin MD.          Labs:  Results from last 7 days   Lab Units 12/06/22  0339   WBC 10*3/mm3 31.50*   HEMOGLOBIN g/dL 9.4*   HEMATOCRIT % 30.0*   PLATELETS 10*3/mm3 310     Results from last 7 days   Lab Units 12/06/22  0339   SODIUM mmol/L 153*   POTASSIUM mmol/L 3.5   CHLORIDE mmol/L 119*   CO2 mmol/L 23.0   BUN mg/dL 56*   CREATININE mg/dL 1.32*   CALCIUM mg/dL 9.1   BILIRUBIN mg/dL 0.5   ALK PHOS U/L 125*   ALT (SGPT) U/L 96*   AST (SGOT) U/L 109*   GLUCOSE mg/dL 153*     Results from last 7 days   Lab Units 12/03/22  1314   PH, ARTERIAL pH units 7.443   PO2 ART mm Hg 63.9*   PCO2, ARTERIAL mm Hg 36.6    HCO3 ART mmol/L 25.0     Results from last 7 days   Lab Units 12/06/22  0339 12/05/22  0110 12/04/22  0605   ALBUMIN g/dL 3.10* 2.80* 2.90*         Results from last 7 days   Lab Units 11/30/22  1411   SARAH  Negative     Results from last 7 days   Lab Units 12/06/22  0339   MAGNESIUM mg/dL 2.6*     Results from last 7 days   Lab Units 12/03/22  0336   INR  1.05   APTT seconds 33.1*               Meds:   SCHEDULE  atorvastatin, 80 mg, Per G Tube, Nightly  bisacodyl, 10 mg, Rectal, Once  cefepime, 2 g, Intravenous, Q24H  insulin lispro, 2-7 Units, Subcutaneous, Q6H  ipratropium-albuterol, 3 mL, Nebulization, 4x Daily - RT  lidocaine, 1 patch, Transdermal, Q24H  midodrine, 10 mg, Nasogastric, TID AC  sertraline, 50 mg, Per G Tube, Daily  sodium chloride, 10 mL, Intravenous, Q12H      Infusions  sodium chloride, 100 mL/hr, Last Rate: 100 mL/hr (12/06/22 0557)      PRNs  •  bisacodyl  •  dextrose  •  dextrose  •  glucagon (human recombinant)  •  hydrALAZINE  •  labetalol  •  Morphine  •  ondansetron  •  sodium chloride  •  sodium chloride  •  sodium chloride  •  sodium chloride    Physical Exam:  Physical Exam  Vitals reviewed.   Constitutional:       Appearance: She is ill-appearing.   Pulmonary:      Breath sounds: Rhonchi and rales present.   Skin:     General: Skin is warm and dry.         ROS  Review of Systems   Unable to perform ROS: Mental status change       I have reviewed the patient's new clinical results.    Electronically signed by Naz Walls MD

## 2022-12-06 NOTE — PROGRESS NOTES
General Surgery Progress Note    Name: Cheyenne Estrella ADMIT: 2022   : 1950  PCP: Melba Rosen APRN    MRN: [id number] LOS: 12 days   AGE/SEX: 72 y.o. female  ROOM: [address] [place] [address]    Chief Complaint   Patient presents with   • Speech Problem     Subjective     More comfortable appearing today, tolerating tube feeds    Objective     Scheduled Medications:   atorvastatin, 80 mg, Per G Tube, Nightly  bisacodyl, 10 mg, Rectal, Once  cefepime, 2 g, Intravenous, Q24H  insulin lispro, 2-7 Units, Subcutaneous, Q6H  ipratropium-albuterol, 3 mL, Nebulization, 4x Daily - RT  lidocaine, 1 patch, Transdermal, Q24H  midodrine, 10 mg, Nasogastric, TID AC  sertraline, 50 mg, Per G Tube, Daily  sodium chloride, 10 mL, Intravenous, Q12H        Active Infusions:  sodium chloride, 100 mL/hr, Last Rate: 100 mL/hr (22 0557)        As Needed Medications:  •  bisacodyl  •  dextrose  •  dextrose  •  glucagon (human recombinant)  •  hydrALAZINE  •  labetalol  •  Morphine  •  ondansetron  •  sodium chloride  •  sodium chloride  •  sodium chloride  •  sodium chloride    Vital Signs  Vital Signs Patient Vitals for the past 24 hrs:   BP Temp Temp src Pulse Resp SpO2   22 1730 -- 99.3 °F (37.4 °C) Axillary 106 25 93 %   22 1717 159/59 -- -- 108 -- --   22 1626 -- -- -- 115 20 95 %   22 1620 -- -- -- 105 20 95 %   22 1401 158/56 98.4 °F (36.9 °C) Axillary 105 20 95 %   22 1144 -- -- -- 111 28 97 %   22 1138 -- -- -- 112 26 95 %   22 1125 137/92 -- -- -- -- --   22 1016 (!) 181/67 99 °F (37.2 °C) Axillary 109 25 95 %   22 0810 152/50 -- -- 101 -- --   22 0740 -- -- -- 115 22 99 %   22 0735 -- -- -- 112 22 98 %   22 0600 -- -- -- 89 -- 96 %   22 0517 (!) 191/62 98.3 °F (36.8 °C) Axillary 117 24 94 %   22 0500 -- -- -- 118 -- 93 %   22 0400 -- -- -- 118 -- 94 %   22 0310 168/90 98.1 °F (36.7 °C) Axillary 106 23 96 %    12/06/22 0300 -- -- -- 104 -- --   12/06/22 0200 -- -- -- 119 -- --   12/06/22 0100 -- -- -- 109 -- --   12/06/22 0000 -- -- -- 97 -- --   12/05/22 2300 110/77 100 °F (37.8 °C) Axillary 92 23 --   12/05/22 2200 -- -- -- 117 -- --   12/05/22 2100 -- -- -- 116 -- 90 %   12/05/22 2000 -- -- -- 114 -- 93 %     I/O:  I/O last 3 completed shifts:  In: 1452.9 [I.V.:1030; Blood:422.9]  Out: 3450 [Urine:3450]    Physical Exam:  Physical Exam  Constitutional:       General: She is not in acute distress.     Appearance: Normal appearance. She is not ill-appearing.   HENT:      Head: Normocephalic and atraumatic.      Right Ear: External ear normal.      Left Ear: External ear normal.   Eyes:      Extraocular Movements: Extraocular movements intact.      Conjunctiva/sclera: Conjunctivae normal.   Cardiovascular:      Rate and Rhythm: Normal rate and regular rhythm.   Pulmonary:      Effort: Pulmonary effort is normal. No respiratory distress.   Abdominal:      General: There is no distension.      Palpations: Abdomen is soft.      Comments: Tender over area of hematoma   Musculoskeletal:         General: No swelling or deformity.   Skin:     General: Skin is warm and dry.   Neurological:      Mental Status: She is alert and oriented to person, place, and time. Mental status is at baseline.         Results Review:     CBC    Results from last 7 days   Lab Units 12/06/22  0339 12/05/22  1553 12/05/22  0110 12/04/22  1801 12/04/22  1159 12/04/22  0605 12/03/22  2357 12/03/22  1110 12/03/22  0336 12/02/22  0331 12/01/22  0319   WBC 10*3/mm3 31.50*  --  29.90*  --  35.70*  --  37.60*  --  27.60* 13.00* 13.30*   HEMOGLOBIN g/dL 9.4* 9.1* 7.4* 8.1* 8.5*  8.8* 8.7* 7.4*   < > 9.3* 12.2 12.2   PLATELETS 10*3/mm3 310  --  250  --  288  --  290  --  360 243 239    < > = values in this interval not displayed.     BMP   Results from last 7 days   Lab Units 12/06/22  0339 12/05/22  0110 12/04/22  0605 12/03/22  0056 12/02/22  0331  12/01/22  0319 11/30/22  0257   SODIUM mmol/L 153* 149* 146* 149* 147* 147* 142   POTASSIUM mmol/L 3.5 3.9 4.6 4.2 4.0 3.8 3.9   CHLORIDE mmol/L 119* 113* 109* 108* 107 108* 104   CO2 mmol/L 23.0 22.0 21.0* 28.0 30.0* 29.0 28.0   BUN mg/dL 56* 85* 81* 48* 26* 24* 21   CREATININE mg/dL 1.32* 2.28* 3.31* 1.73* 0.75 0.69 0.65   GLUCOSE mg/dL 153* 130* 150* 224* 148* 169* 168*   MAGNESIUM mg/dL 2.6* 2.6* 2.4 2.3 2.3 2.3 2.1   PHOSPHORUS mg/dL 2.6 4.9* 5.5* 5.3* 4.3 3.6 3.5     Radiology(recent) US Renal Bilateral    Result Date: 12/5/2022  1. Mild left renal pelviectasis and mild hydronephrosis similar to prior CT exam. 2. Cortical thinning/scarring at the right kidney. No hydronephrosis on the right. 3. Left lower pelvic hematoma partially imaged.  Electronically Signed By-Surendra Lin MD On:12/5/2022 8:43 AM This report was finalized on [id number] by  Surendra Lin MD.      I reviewed the patient's new clinical results.    Assessment & Plan       Cerebrovascular accident (CVA), unspecified mechanism (HCC)    Aphasia    Tobacco abuse    Carotid stenosis, bilateral    Essential hypertension    Feeding difficulty       72 y.o. female with retrorectus and pelvic hematoma.  Spontaneous bleed.  Continue to trend hemoglobin, it appears as if she is no longer bleeding.  Continue to hold anticoagulation. Okay to resume tube feeds.  Continue to trend white blood cell count.               This note was created using Dragon Voice Recognition software.    Landry Rivas MD  12/06/22  17:31 EST

## 2022-12-06 NOTE — CASE MANAGEMENT/SOCIAL WORK
Continued Stay Note   Manny     Patient Name: Cheyenne Estrella  MRN: [id number]  Today's Date: 12/6/2022    Admit Date: 11/24/2022    Plan: Pending Hospice Agency.  List given to son   Discharge Plan     Row Name 12/06/22 1335       Plan    Plan Pending Hospice Agency.  List given to son           Expected Discharge Date and Time     Expected Discharge Date Expected Discharge Time    Dec 9, 2022             Gia Chavez RN

## 2022-12-06 NOTE — PLAN OF CARE
Problem: Adult Inpatient Plan of Care  Goal: Plan of Care Review  Outcome: Ongoing, Progressing  Goal: Patient-Specific Goal (Individualized)  Outcome: Ongoing, Progressing  Goal: Absence of Hospital-Acquired Illness or Injury  Outcome: Ongoing, Progressing  Intervention: Identify and Manage Fall Risk  Recent Flowsheet Documentation  Taken 12/6/2022 0400 by Kia Cardenas RN  Safety Promotion/Fall Prevention:   safety round/check completed   room organization consistent   nonskid shoes/slippers when out of bed   lighting adjusted   fall prevention program maintained   clutter free environment maintained   assistive device/personal items within reach  Taken 12/6/2022 0200 by Kia Cardenas RN  Safety Promotion/Fall Prevention:   safety round/check completed   room organization consistent   nonskid shoes/slippers when out of bed   lighting adjusted   fall prevention program maintained   clutter free environment maintained   assistive device/personal items within reach  Taken 12/6/2022 0000 by Kia Cardenas RN  Safety Promotion/Fall Prevention:   safety round/check completed   room organization consistent   nonskid shoes/slippers when out of bed   lighting adjusted   fall prevention program maintained   clutter free environment maintained   assistive device/personal items within reach  Taken 12/5/2022 2200 by Kia Cardenas RN  Safety Promotion/Fall Prevention:   safety round/check completed   room organization consistent   nonskid shoes/slippers when out of bed   lighting adjusted   fall prevention program maintained   clutter free environment maintained   assistive device/personal items within reach  Taken 12/5/2022 1953 by Kia Cardenas RN  Safety Promotion/Fall Prevention:   safety round/check completed   room organization consistent   nonskid shoes/slippers when out of bed   lighting adjusted   fall prevention program maintained   clutter free environment maintained   assistive device/personal items  within reach  Intervention: Prevent Skin Injury  Recent Flowsheet Documentation  Taken 12/5/2022 1953 by Kia Cardenas RN  Skin Protection:   adhesive use limited   incontinence pads utilized   transparent dressing maintained   pulse oximeter probe site changed  Intervention: Prevent and Manage VTE (Venous Thromboembolism) Risk  Recent Flowsheet Documentation  Taken 12/6/2022 0400 by Kia Cardenas RN  VTE Prevention/Management: sequential compression devices on  Taken 12/6/2022 0000 by Kia Cardenas RN  VTE Prevention/Management: sequential compression devices on  Taken 12/5/2022 1953 by Kia Cardenas RN  VTE Prevention/Management: sequential compression devices on  Intervention: Prevent Infection  Recent Flowsheet Documentation  Taken 12/6/2022 0400 by Kia Cardenas RN  Infection Prevention:   single patient room provided   rest/sleep promoted   hand hygiene promoted  Taken 12/6/2022 0200 by Kia Cardenas RN  Infection Prevention:   single patient room provided   rest/sleep promoted   hand hygiene promoted  Taken 12/6/2022 0000 by Kia Cardenas RN  Infection Prevention:   single patient room provided   rest/sleep promoted   hand hygiene promoted  Taken 12/5/2022 2200 by Kia Cardenas RN  Infection Prevention:   single patient room provided   rest/sleep promoted   hand hygiene promoted  Taken 12/5/2022 1953 by Kia Cardenas RN  Infection Prevention:   single patient room provided   rest/sleep promoted   hand hygiene promoted  Goal: Optimal Comfort and Wellbeing  Outcome: Ongoing, Progressing  Intervention: Provide Person-Centered Care  Recent Flowsheet Documentation  Taken 12/6/2022 0400 by Kia Cardenas RN  Trust Relationship/Rapport: (pt sleeping) other (see comments)  Taken 12/6/2022 0000 by Kia Cardenas RN  Trust Relationship/Rapport: (pt sleeping) other (see comments)  Taken 12/5/2022 1953 by Kia Cardenas RN  Trust Relationship/Rapport:   care explained   emotional support  provided   reassurance provided  Goal: Readiness for Transition of Care  Outcome: Ongoing, Progressing     Problem: Adjustment to Illness (Stroke, Ischemic/Transient Ischemic Attack)  Goal: Optimal Coping  Outcome: Ongoing, Progressing  Intervention: Support Psychosocial Response to Stroke  Recent Flowsheet Documentation  Taken 12/5/2022 1953 by Kia Cardenas RN  Supportive Measures: active listening utilized     Problem: Bowel Elimination Impaired (Stroke, Ischemic/Transient Ischemic Attack)  Goal: Effective Bowel Elimination  Outcome: Ongoing, Progressing     Problem: Cerebral Tissue Perfusion (Stroke, Ischemic/Transient Ischemic Attack)  Goal: Optimal Cerebral Tissue Perfusion  Outcome: Ongoing, Progressing  Intervention: Protect and Optimize Cerebral Perfusion  Recent Flowsheet Documentation  Taken 12/5/2022 1953 by Kia Cardenas RN  Fluid/Electrolyte Management: fluids adjusted  Sensory Stimulation Regulation: television on     Problem: Cognitive Impairment (Stroke, Ischemic/Transient Ischemic Attack)  Goal: Optimal Cognitive Function  Outcome: Ongoing, Progressing  Intervention: Optimize Cognitive Function  Recent Flowsheet Documentation  Taken 12/5/2022 1953 by Kia Cardenas RN  Sensory Stimulation Regulation: television on  Reorientation Measures:   calendar in view   clock in view     Problem: Communication Impairment (Stroke, Ischemic/Transient Ischemic Attack)  Goal: Improved Communication Skills  Outcome: Ongoing, Progressing  Intervention: Optimize Communication Skills  Recent Flowsheet Documentation  Taken 12/5/2022 1953 by Kia Cardenas RN  Communication Enhancement Strategies:   verbal and visual cues paired   one-step directions provided   repetition utilized     Problem: Functional Ability Impaired (Stroke, Ischemic/Transient Ischemic Attack)  Goal: Optimal Functional Ability  Outcome: Ongoing, Progressing     Problem: Respiratory Compromise (Stroke, Ischemic/Transient Ischemic  Attack)  Goal: Effective Oxygenation and Ventilation  Outcome: Ongoing, Progressing     Problem: Sensorimotor Impairment (Stroke, Ischemic/Transient Ischemic Attack)  Goal: Improved Sensorimotor Function  Outcome: Ongoing, Progressing  Intervention: Optimize Sensory and Perceptual Ability  Recent Flowsheet Documentation  Taken 12/5/2022 1953 by Kia Cardenas RN  Pressure Reduction Techniques: weight shift assistance provided  Pressure Reduction Devices: pressure-redistributing mattress utilized     Problem: Swallowing Impairment (Stroke, Ischemic/Transient Ischemic Attack)  Goal: Optimal Eating and Swallowing without Aspiration  Outcome: Ongoing, Progressing     Problem: Urinary Elimination Impaired (Stroke, Ischemic/Transient Ischemic Attack)  Goal: Effective Urinary Elimination  Outcome: Ongoing, Progressing     Problem: Asthma Comorbidity  Goal: Maintenance of Asthma Control  Outcome: Ongoing, Progressing  Intervention: Maintain Asthma Symptom Control  Recent Flowsheet Documentation  Taken 12/6/2022 0400 by Kia Cardenas RN  Medication Review/Management: medications reviewed  Taken 12/6/2022 0200 by Kia Cardenas RN  Medication Review/Management: medications reviewed  Taken 12/6/2022 0000 by Kia Cardenas RN  Medication Review/Management: medications reviewed  Taken 12/5/2022 2200 by Kia Cardenas RN  Medication Review/Management: medications reviewed  Taken 12/5/2022 1953 by Kia Cardenas RN  Medication Review/Management: medications reviewed     Problem: Behavioral Health Comorbidity  Goal: Maintenance of Behavioral Health Symptom Control  Outcome: Ongoing, Progressing  Intervention: Maintain Behavioral Health Symptom Control  Recent Flowsheet Documentation  Taken 12/6/2022 0400 by Kia Cardenas RN  Medication Review/Management: medications reviewed  Taken 12/6/2022 0200 by Kia Cardenas RN  Medication Review/Management: medications reviewed  Taken 12/6/2022 0000 by Kia Cardenas  RN  Medication Review/Management: medications reviewed  Taken 12/5/2022 2200 by Kia Cardenas RN  Medication Review/Management: medications reviewed  Taken 12/5/2022 1953 by Kia Cardenas RN  Medication Review/Management: medications reviewed     Problem: COPD (Chronic Obstructive Pulmonary Disease) Comorbidity  Goal: Maintenance of COPD Symptom Control  Outcome: Ongoing, Progressing  Intervention: Maintain COPD-Symptom Control  Recent Flowsheet Documentation  Taken 12/6/2022 0400 by Kia Cardenas RN  Medication Review/Management: medications reviewed  Taken 12/6/2022 0200 by Kia Cardenas RN  Medication Review/Management: medications reviewed  Taken 12/6/2022 0000 by Kia Cardenas RN  Medication Review/Management: medications reviewed  Taken 12/5/2022 2200 by Kia Cardenas RN  Medication Review/Management: medications reviewed  Taken 12/5/2022 1953 by Kia Cardenas RN  Supportive Measures: active listening utilized  Medication Review/Management: medications reviewed     Problem: Diabetes Comorbidity  Goal: Blood Glucose Level Within Targeted Range  Outcome: Ongoing, Progressing  Intervention: Monitor and Manage Glycemia  Recent Flowsheet Documentation  Taken 12/5/2022 1953 by Kia Cardenas RN  Glycemic Management: blood glucose monitored     Problem: Heart Failure Comorbidity  Goal: Maintenance of Heart Failure Symptom Control  Outcome: Ongoing, Progressing  Intervention: Maintain Heart Failure-Management  Recent Flowsheet Documentation  Taken 12/6/2022 0400 by Kia Cardenas RN  Medication Review/Management: medications reviewed  Taken 12/6/2022 0200 by Kia Cardenas RN  Medication Review/Management: medications reviewed  Taken 12/6/2022 0000 by Kia Cardenas RN  Medication Review/Management: medications reviewed  Taken 12/5/2022 2200 by Kia Cardenas RN  Medication Review/Management: medications reviewed  Taken 12/5/2022 1953 by Kia Cardenas RN  Medication Review/Management:  medications reviewed     Problem: Hypertension Comorbidity  Goal: Blood Pressure in Desired Range  Outcome: Ongoing, Progressing  Intervention: Maintain Blood Pressure Management  Recent Flowsheet Documentation  Taken 12/6/2022 0400 by Kia Cardenas RN  Medication Review/Management: medications reviewed  Taken 12/6/2022 0200 by Kia Cardenas RN  Medication Review/Management: medications reviewed  Taken 12/6/2022 0000 by Kia Cardenas RN  Medication Review/Management: medications reviewed  Taken 12/5/2022 2200 by Kia Cardenas RN  Medication Review/Management: medications reviewed  Taken 12/5/2022 1953 by Kia Cardenas RN  Syncope Management: position changed slowly  Medication Review/Management: medications reviewed     Problem: Obstructive Sleep Apnea Risk or Actual Comorbidity Management  Goal: Unobstructed Breathing During Sleep  Outcome: Ongoing, Progressing     Problem: Osteoarthritis Comorbidity  Goal: Maintenance of Osteoarthritis Symptom Control  Outcome: Ongoing, Progressing  Intervention: Maintain Osteoarthritis Symptom Control  Recent Flowsheet Documentation  Taken 12/6/2022 0400 by Kia Cardenas RN  Medication Review/Management: medications reviewed  Taken 12/6/2022 0200 by Kia Cardenas RN  Medication Review/Management: medications reviewed  Taken 12/6/2022 0000 by Kia Cardenas RN  Medication Review/Management: medications reviewed  Taken 12/5/2022 2200 by Kia Cardenas RN  Medication Review/Management: medications reviewed  Taken 12/5/2022 1953 by Kia Cardenas RN  Medication Review/Management: medications reviewed     Problem: Pain Chronic (Persistent) (Comorbidity Management)  Goal: Acceptable Pain Control and Functional Ability  Outcome: Ongoing, Progressing  Intervention: Manage Persistent Pain  Recent Flowsheet Documentation  Taken 12/6/2022 0400 by Kia Cardenas RN  Medication Review/Management: medications reviewed  Taken 12/6/2022 0200 by Kia Cardenas  RN  Medication Review/Management: medications reviewed  Taken 12/6/2022 0000 by Kia Cardenas RN  Medication Review/Management: medications reviewed  Taken 12/5/2022 2200 by Kia Cardenas RN  Medication Review/Management: medications reviewed  Taken 12/5/2022 1953 by Kia Cardenas RN  Medication Review/Management: medications reviewed  Intervention: Optimize Psychosocial Wellbeing  Recent Flowsheet Documentation  Taken 12/6/2022 0400 by Kia Cardenas RN  Diversional Activities: (pt sleeping) other (see comments)  Taken 12/6/2022 0000 by Kia Cardenas RN  Diversional Activities: (pt sleeping) other (see comments)  Taken 12/5/2022 1953 by Kia Cardenas RN  Supportive Measures: active listening utilized  Diversional Activities: television     Problem: Seizure Disorder Comorbidity  Goal: Maintenance of Seizure Control  Outcome: Ongoing, Progressing     Problem: Skin Injury Risk Increased  Goal: Skin Health and Integrity  Outcome: Ongoing, Progressing  Intervention: Optimize Skin Protection  Recent Flowsheet Documentation  Taken 12/5/2022 1953 by Kia Cardenas RN  Pressure Reduction Techniques: weight shift assistance provided  Pressure Reduction Devices: pressure-redistributing mattress utilized  Skin Protection:   adhesive use limited   incontinence pads utilized   transparent dressing maintained   pulse oximeter probe site changed     Problem: Fall Injury Risk  Goal: Absence of Fall and Fall-Related Injury  Outcome: Ongoing, Progressing  Intervention: Identify and Manage Contributors  Recent Flowsheet Documentation  Taken 12/6/2022 0400 by Kia Cardenas RN  Medication Review/Management: medications reviewed  Taken 12/6/2022 0200 by Kia Cardenas RN  Medication Review/Management: medications reviewed  Taken 12/6/2022 0000 by Kia Cardenas RN  Medication Review/Management: medications reviewed  Taken 12/5/2022 2200 by Kia Cardenas RN  Medication Review/Management: medications  reviewed  Taken 12/5/2022 1953 by Kia Cardenas RN  Medication Review/Management: medications reviewed  Intervention: Promote Injury-Free Environment  Recent Flowsheet Documentation  Taken 12/6/2022 0400 by Kia Cardenas RN  Safety Promotion/Fall Prevention:   safety round/check completed   room organization consistent   nonskid shoes/slippers when out of bed   lighting adjusted   fall prevention program maintained   clutter free environment maintained   assistive device/personal items within reach  Taken 12/6/2022 0200 by Kia Cardenas RN  Safety Promotion/Fall Prevention:   safety round/check completed   room organization consistent   nonskid shoes/slippers when out of bed   lighting adjusted   fall prevention program maintained   clutter free environment maintained   assistive device/personal items within reach  Taken 12/6/2022 0000 by Kia Cardenas RN  Safety Promotion/Fall Prevention:   safety round/check completed   room organization consistent   nonskid shoes/slippers when out of bed   lighting adjusted   fall prevention program maintained   clutter free environment maintained   assistive device/personal items within reach  Taken 12/5/2022 2200 by Kia Cardenas RN  Safety Promotion/Fall Prevention:   safety round/check completed   room organization consistent   nonskid shoes/slippers when out of bed   lighting adjusted   fall prevention program maintained   clutter free environment maintained   assistive device/personal items within reach  Taken 12/5/2022 1953 by Kia Cardenas RN  Safety Promotion/Fall Prevention:   safety round/check completed   room organization consistent   nonskid shoes/slippers when out of bed   lighting adjusted   fall prevention program maintained   clutter free environment maintained   assistive device/personal items within reach     Problem: Aspiration (Enteral Nutrition)  Goal: Absence of Aspiration Signs and Symptoms  Outcome: Ongoing, Progressing  Intervention:  Minimize Aspiration Risk  Recent Flowsheet Documentation  Taken 12/5/2022 1953 by Kia Cardenas RN  Enteral Feeding Safety:   placement verified by x-ray   tubing labeled as enteral feeding   tube marked at exit site     Problem: Device-Related Complication Risk (Enteral Nutrition)  Goal: Safe, Effective Therapy Delivery  Outcome: Ongoing, Progressing  Intervention: Prevent Feeding-Related Adverse Events  Recent Flowsheet Documentation  Taken 12/5/2022 1953 by Kia Cardenas RN  Enteral Feeding Safety:   placement verified by x-ray   tubing labeled as enteral feeding   tube marked at exit site     Problem: Feeding Intolerance (Enteral Nutrition)  Goal: Feeding Tolerance  Outcome: Ongoing, Progressing  Intervention: Prevent and Manage Feeding Intolerance  Recent Flowsheet Documentation  Taken 12/5/2022 1953 by Kia Cardenas RN  Nutrition Support Management: tube feeding formula adjusted     Problem: Restraint, Nonbehavioral (Nonviolent)  Goal: Absence of Harm or Injury  Outcome: Ongoing, Progressing  Intervention: Implement Least Restrictive Safety Strategies  Recent Flowsheet Documentation  Taken 12/6/2022 0400 by Kia Cardenas RN  Medical Device Protection:   IV pole/bag removed from visual field   torso covered   tubing secured  Diversional Activities: (pt sleeping) other (see comments)  Taken 12/6/2022 0200 by Kia Cardenas RN  Medical Device Protection:   IV pole/bag removed from visual field   torso covered   tubing secured  Taken 12/6/2022 0000 by Kia Cardenas RN  Medical Device Protection:   IV pole/bag removed from visual field   torso covered   tubing secured  Diversional Activities: (pt sleeping) other (see comments)  Taken 12/5/2022 2200 by Kia Cardenas RN  Medical Device Protection:   IV pole/bag removed from visual field   torso covered   tubing secured  Taken 12/5/2022 1953 by Kia Cardenas RN  Medical Device Protection:   IV pole/bag removed from visual field   torso covered    tubing secured  Diversional Activities: television  Intervention: Protect Dignity, Rights, and Personal Wellbeing  Recent Flowsheet Documentation  Taken 12/6/2022 0400 by Kia Cardenas RN  Trust Relationship/Rapport: (pt sleeping) other (see comments)  Taken 12/6/2022 0000 by Kia Cardenas RN  Trust Relationship/Rapport: (pt sleeping) other (see comments)  Taken 12/5/2022 1953 by Kia Cardenas RN  Trust Relationship/Rapport:   care explained   emotional support provided   reassurance provided   Goal Outcome Evaluation:

## 2022-12-06 NOTE — PROGRESS NOTES
[facility] Medicine Services Daily Progress Note    Patient Name: Cheyenne Estrella  : 1950  MRN: [id number]  Primary Care Physician:  Melba Rosen APRN  Date of admission: 2022      Subjective      Chief Complaint: Severe aphasia     Patient remains fairly unresponsive and intermittently restless.  Right hand restraint to avoid pulling her lines.  No family at the bedside during my visit.  She is on 11 L high flow nasal cannula with tachycardia and slightly improved renal function.  She is being followed by palliative care, nephrology, surgery, GI, and pulmonology.  She is definitely an appropriate candidate for comfort care or hospice.      Extensive chart review for service turnover today. Bedside rounding completed with the nursing staff. No other acute concerns.    ROS negative except as above       Objective      Vitals:   Temp:  [98.1 °F (36.7 °C)-100.2 °F (37.9 °C)] 99 °F (37.2 °C)  Heart Rate:  [] 109  Resp:  [20-25] 25  BP: (110-191)/(42-90) 181/67  Flow (L/min):  [11-12] 11       Physical Exam  Vitals reviewed.   Constitutional:       Comments: No family at the bedside     HENT:      Head: Normocephalic.      Nose: Nose normal--Dobbhoff in place.      Mouth/Throat:      Mouth: Mucous membranes are moist.   Cardiovascular:      Rate and Rhythm: Normal rate and regular rhythm.      Pulses: Normal pulses.      Heart sounds: Normal heart sounds.   Pulmonary:      Effort: Pulmonary effort is normal.      Breath sounds: Normal breath sounds.   Abdominal:      General: Abdomen is flat.      Palpations: Abdomen is soft.   Musculoskeletal:         General: Normal range of motion.      Cervical back: Normal range of motion.   Skin:     General: Skin is warm.   Neurological:      General: No focal deficit present.      Mental Status: Fairly unresponsive, no meaningful interaction.  Staring off into space, oral swab hanging out of her mouth like a sucker.       Comments: Patient is aphasic   Psychiatric:         Mood and Affect: Flat affect     Result Review    Result Review:  I have personally reviewed the results from the time of this admission to 12/6/2022 10:31 EST and agree with these findings:  [x]  Laboratory  [x]  Microbiology  [x]  Radiology  [x]  EKG/Telemetry   [x]  Cardiology/Vascular   []  Pathology  [x]  Old records  []  Other:  Assessment & Plan       Brief Patient Summary:  Cheyenne Estrella is a 72 y.o. female with PMHx of HTN, HLD, anxiety, CVA who presented to [facility] on 11/24/2022 complaining of aphasia.  Due to condition HPI obtained from family at bedside and available records.  LWK 1730. Patient started slurring speech on phone with family and EMS called.  Patient then became completely aphasic.  Denies chest pain, dyspnea, fevers, chills, numbness, tingling, or gait instability.  Presented to [facility] ED due to possibility of stroke     In the ED, vitals 98.3, HR 79, RR 24, /67, 91% RA.  Labs notable for troponin <0.01, glucose 110.  Stroke team called.  Pt outside window for Tpa.  CT head showed no acute abnormality but did show remote infarct in left basal ganglia extending into the left corona radiata along with small remote infarcts in the left thalamus and right basal ganglia.  CTA head/neck pending.  Patient admitted for neurological evaluation.        Active Hospital Problems:  Patient Active Problem List   Diagnosis   • Aphasia   • Obesity (BMI 30-39.9)   • Oral thrush   • Tobacco abuse   • Encephalopathy   • CVA (cerebral vascular accident) (HCC)   • Cerebrovascular accident (CVA), unspecified mechanism (HCC)   • Carotid stenosis, bilateral   • Essential hypertension   • Feeding difficulty   • Contrast-induced nephropathy   • Acute hypoxemic respiratory failure (HCC)   • CAP (community acquired pneumonia)      Plan:   Rectus sheath hematoma  Monitor H&H closely  Transfuse if less than 8.    Another unit of blood  ordered December 5   GI will not place feeding tube due to bleeding risk     Aphasia-secondary to underlying infarct, possibility of evolving state.  Patient was established history of previous CVA within the last year was still in the process of rehabbing.  Patient's granddaughter reports patient recently lost her spouse and had great deal of emotional stress, appears stable but not improving  -Neurology evaluated concern for progression or worsening  -Repeat MRI showing new areas of acute infarct in right precentral gyrus cortex and within inferior right frontal lobe  -CTA showing 60% stenosis of right CCA 50% stenosis of left CCA but not actionable at this time  -ED evaluated not criteria for tPA due to questionable timeframe  -Antiplatelet  -Echo showing normal EF no discussion of shunting  -PT/OT/speech  -May need discussion of PEG tube, family aware and on Dobhoff NG tube feeds  -B12 and thyroid function ordered  -Given underlying carotid disease vascular surgery consulted recommending medical management at this time can follow-up in clinic for further discussion  -Continue speech therapy  -Repeat CT of the head ordered for persistent lethargy.    No acute findings.  -Patient agreed to PEG tube placement and rehab on November 30.  GI consulted. Deferred due to spontaneous bleeding resulting in abdominal wall hematoma    Leukocytosis-left-sided pneumonia on imaging.  Likely reactive as well     Hypertension/hyperlipidemia/anxiety-chronic in nature  -Allow for permissive hypertension  -Resume home medication as clinically appropriate     Acute renal failure  Likely due to hypotension due to hemorrhage and contrast induced injury  Nephrology consulted     Acute respiratory failure  ABG shows hypoxemia with PO2 in the 60s  Pulmonary consulted  Chest x-ray ordered shows left-sided consolidation/pneumonia. Supplemental oxygen and pulmonary consult ordered     Persistent neck pain lidocaine patch ordered.  CT soft  tissue neck negative       Palliative consulted per family request      DVT prophylaxis:  Mechanical DVT prophylaxis orders are present.     CODE STATUS:    Code Status (Patient has no pulse and is not breathing): CPR (Attempt to Resuscitate)  Medical Interventions (Patient has pulse or is breathing): Full Support  Release to patient: Routine Release     FMLA paperwork has been completed for patient's daughter     Disposition: Given severity of stroke uncertain discharge time   Comfort care vs hospice would be most appropriate disposition    Electronically signed by Ok Scott MD, 12/06/22, 10:31 EST.  [facility] Hospitalist Team

## 2022-12-06 NOTE — PROGRESS NOTES
"   LOS: 12 days     Chief Complaint/ Reason for encounter: Acute kidney injury    Subjective   12/05/22 : She remains confused, poor historian but does nod her head yes and no  No complaints  Discussed with family at bedside  Williamson in place and urine output seems to be increasing  No vomiting reported, Dobbhoff tube remains in place    12/06 no acute events. Daughter at bedside. Reports patient wave at her early today. On 0.45% saline at 100  Ml/hr. Started on tube feeds and free water yesterday afternoon.      Medical history reviewed:  History of Present Illness    Subjective    History taken from: Patient and chart    Vital Signs  Temp:  [98.1 °F (36.7 °C)-100.2 °F (37.9 °C)] 99 °F (37.2 °C)  Heart Rate:  [] 111  Resp:  [20-28] 28  BP: (110-191)/(46-92) 137/92       Wt Readings from Last 1 Encounters:   12/05/22 0410 95.1 kg (209 lb 10.5 oz)   12/04/22 0548 97 kg (213 lb 13.5 oz)   12/03/22 0620 95.1 kg (209 lb 10.5 oz)   11/28/22 0458 77.8 kg (171 lb 8.3 oz)   11/26/22 1442 77.1 kg (170 lb)   11/24/22 2148 77.1 kg (170 lb)       Objective:  Vital signs: (most recent): Blood pressure 137/92, pulse 111, temperature 99 °F (37.2 °C), temperature source Axillary, resp. rate 28, height 172.7 cm (68\"), weight 95.1 kg (209 lb 10.5 oz), SpO2 97 %.              Objective:  General Appearance:  Comfortable, confused, ill-appearing, in no acute distress and not in pain.  Awakens to voice but not oriented  HEENT: Mucous membranes moist, no injury, oropharynx clear  Lungs:  Normal effort and normal respiratory rate.  Breath sounds clear to auscultation.  No  respiratory distress.  No rales, decreased breath sounds or rhonchi.    Heart: Normal rate.  Regular rhythm.  S1, S2 normal.  No murmur.   Abdomen: Abdomen is soft.  Bowel sounds are normal, no abdominal tenderness.  There is no rebound or guarding  Extremities: 1+ edema of bilateral lower extremities  Neurological: No focal motor or sensory deficits, pupils " reactive  Skin:  Warm and dry.  No rash or cyanosis.       Results Review:    Intake/Output:     Intake/Output Summary (Last 24 hours) at 12/6/2022 1312  Last data filed at 12/6/2022 0800  Gross per 24 hour   Intake 422.92 ml   Output 2050 ml   Net -1627.08 ml         DATA:  Radiology and Labs:  The following labs independently reviewed by me. Additional labs ordered for tomorrow a.m.  Interval notes, chart personally reviewed by me.   Old records independently reviewed showing normal baseline creatinine of 0.6  The following radiologic studies independently viewed by me, findings renal ultrasound showing:Mild left renal pelviectasis and mild hydronephrosis similar to prior  CT exam.  2. Cortical thinning/scarring at the right kidney. No hydronephrosis on  the right.  New problems include KVNG, MARIAH  Discussed with patient and multiple family members at bedside    Risk/ complexity of medical care/ medical decision making high risk, severe acute renal failure, consideration for possible dialysis    Labs:   Recent Results (from the past 24 hour(s))   Hemoglobin & Hematocrit, Blood    Collection Time: 12/05/22  3:53 PM    Specimen: Blood   Result Value Ref Range    Hemoglobin 9.1 (L) 12.0 - 15.9 g/dL    Hematocrit 28.8 (L) 34.0 - 46.6 %   POC Glucose Once    Collection Time: 12/05/22  5:52 PM    Specimen: Blood   Result Value Ref Range    Glucose 122 (H) 70 - 105 mg/dL   POC Glucose Once    Collection Time: 12/05/22 11:59 PM    Specimen: Blood   Result Value Ref Range    Glucose 132 (H) 70 - 105 mg/dL   Prepare RBC, 1 Units    Collection Time: 12/06/22  2:00 AM   Result Value Ref Range    Product Code [id number]     Unit Number [id number]     UNIT  ABO B     UNIT  RH NEG     Crossmatch Interpretation Compatible     Dispense Status PT     Blood Expiration Date [id number]     Blood Type Barcode 1700    Comprehensive Metabolic Panel    Collection Time: 12/06/22  3:39 AM    Specimen: Blood   Result Value Ref Range     Glucose 153 (H) 65 - 99 mg/dL    BUN 56 (H) 8 - 23 mg/dL    Creatinine 1.32 (H) 0.57 - 1.00 mg/dL    Sodium 153 (H) 136 - 145 mmol/L    Potassium 3.5 3.5 - 5.2 mmol/L    Chloride 119 (H) 98 - 107 mmol/L    CO2 23.0 22.0 - 29.0 mmol/L    Calcium 9.1 8.6 - 10.5 mg/dL    Total Protein 6.5 6.0 - 8.5 g/dL    Albumin 3.10 (L) 3.50 - 5.20 g/dL    ALT (SGPT) 96 (H) 1 - 33 U/L    AST (SGOT) 109 (H) 1 - 32 U/L    Alkaline Phosphatase 125 (H) 39 - 117 U/L    Total Bilirubin 0.5 0.0 - 1.2 mg/dL    Globulin 3.4 gm/dL    A/G Ratio 0.9 g/dL    BUN/Creatinine Ratio 42.4 (H) 7.0 - 25.0    Anion Gap 11.0 5.0 - 15.0 mmol/L    eGFR 42.7 (L) >60.0 mL/min/1.73   Magnesium    Collection Time: 12/06/22  3:39 AM    Specimen: Blood   Result Value Ref Range    Magnesium 2.6 (H) 1.6 - 2.4 mg/dL   Phosphorus    Collection Time: 12/06/22  3:39 AM    Specimen: Blood   Result Value Ref Range    Phosphorus 2.6 2.5 - 4.5 mg/dL   CBC Auto Differential    Collection Time: 12/06/22  3:39 AM    Specimen: Blood   Result Value Ref Range    WBC 31.50 (C) 3.40 - 10.80 10*3/mm3    RBC 3.27 (L) 3.77 - 5.28 10*6/mm3    Hemoglobin 9.4 (L) 12.0 - 15.9 g/dL    Hematocrit 30.0 (L) 34.0 - 46.6 %    MCV 91.9 79.0 - 97.0 fL    MCH 28.7 26.6 - 33.0 pg    MCHC 31.2 (L) 31.5 - 35.7 g/dL    RDW 16.1 (H) 12.3 - 15.4 %    RDW-SD 52.5 37.0 - 54.0 fl    MPV 10.1 6.0 - 12.0 fL    Platelets 310 140 - 450 10*3/mm3   Scan Slide    Collection Time: 12/06/22  3:39 AM    Specimen: Blood   Result Value Ref Range    Scan Slide     Manual Differential    Collection Time: 12/06/22  3:39 AM    Specimen: Blood   Result Value Ref Range    Neutrophil % 74.0 42.7 - 76.0 %    Lymphocyte % 9.0 (L) 19.6 - 45.3 %    Monocyte % 1.0 (L) 5.0 - 12.0 %    Bands %  16.0 (H) 0.0 - 5.0 %    Neutrophils Absolute 28.35 (H) 1.70 - 7.00 10*3/mm3    Lymphocytes Absolute 2.84 0.70 - 3.10 10*3/mm3    Monocytes Absolute 0.32 0.10 - 0.90 10*3/mm3    Anisocytosis Slight/1+ None Seen    Toxic Granulation  Slight/1+ None Seen    Platelet Morphology Normal Normal   POC Glucose Once    Collection Time: 12/06/22  6:10 AM    Specimen: Blood   Result Value Ref Range    Glucose 152 (H) 70 - 105 mg/dL   POC Glucose Once    Collection Time: 12/06/22 12:04 PM    Specimen: Blood   Result Value Ref Range    Glucose 156 (H) 70 - 105 mg/dL       Radiology:  Pertinent radiology studies were reviewed as described above  Renal ultrasound as detailed above    Assessment:  New acute renal failure, secondary to contrast nephropathy and acute blood loss anemia, seems to be improving with improved urine output and falling waste products  Acute blood loss anemia  Contrast nephropathy  Rectus sheath hematoma, no need for IR embolization per IR, would be high risk regardless with her resolving renal failure  Altered mental status  Hypernatremia  Worsening anemia  CVA with aphasia  Hypotension on midodrine      Plan:  Renal function seems to be improving with improved urine output  No significant obstruction on renal ultrasound  Continue midodrine for BP support  Na worse today. Started on tube feeds with water flush yesterday afternoon. Will continue current regimen for now. Recheck labs in am, if Na not improving, would change to D5W.   Follow-up a.m. labs  Avoid any potential nephrotoxins during renal recovery      Roopa Townsend MD  [facility]   Office phone number: [phone]  Answering service phone number: [phone]    12/06/22  13:12 EST

## 2022-12-06 NOTE — CONSULTS
Nutrition Services    Patient Name: Cheyenne Estrella  YOB: 1950  MRN: [id number]  Admission date: 11/24/2022    Comments:     Increase TF to Isosource 1.5 at 55 mL/hr (new goal rate)   Increase water flush to 70 mL/hr water flush r/t hypernatremia   Begin Prosource TF QD     PPE Documentation        PPE Worn By Provider mask, gloves and eye protection   PPE Worn By Patient  None      CLINICAL NUTRITION ASSESSMENT      Reason for Assessment Follow up protocol   11/26: Tube feeding assessment      H&P      Past Medical History:   Diagnosis Date   • Depression    • Hypertension    • Tobacco abuse        Past Surgical History:   Procedure Laterality Date   • KNEE SURGERY          Current Problems   Aphasia   - secondary to CVA   - neurology following   - unable to take PO (PEG being considered, NG in place currently)    HTN/HLD/anxiety  -chronic     Encounter Information        Trending Narrative     12/6: Per RD note yesterday, PEG had been planned however pt has a L rectus sheath hematoma with hemorrhage extending into the left pelvis, making it impossible to move forward with PEG at this time. For this reason, TF will resume via NG for now. TF ordered at Isosource 1.5 50ml/hr, currently running at 30ml/hr per EMR. Visited pt in room, family member present. No questions about TF. Observed Isosource HN running at 30ml/hr, communicated with RN to change formula to Isosource 1.5 and advance rate.     11/26: Received consult for tube feeding. Visited pt and family member in room. Family confirms pt has no food allergies and was eating a regular, varied diet with good intake prior to admission. Family was not aware of weight loss, but pt actually has had significant loss in the last six months. Noted pt recently experienced the death of her SO, which may have contributed to decreased intake. Pt not verbalizing responses, so unsure. NFPE reveals moderate temporal wasting, but other sites remain  "well-nourished. At this point, based on intake report and minimally indicative NFPE, cannot confidently diagnose malnutrition. Will begin TF today; NG is in place already. Likely will need PEG if unable to resume PO in the next few days.     Anthropometrics        Current Height, Weight Height: 172.7 cm (68\")  Weight: 95.1 kg (209 lb 10.5 oz) (12/05/22 0410)       Ideal Body Weight (IBW) 140 lb   Usual Body Weight (UBW) ~200-210 lb       Trending Weight Hx     This admission: 12/6: 95.1 kg, it appears the wt 77 kg upon admission was stated so 95.1kg is likely more accurate   11/26: Scale weight 77.1 kg              PTA: 11/26: 15.6% loss in six months; 11.9% loss in the last month    Wt Readings from Last 30 Encounters:   12/05/22 0410 95.1 kg (209 lb 10.5 oz)   12/04/22 0548 97 kg (213 lb 13.5 oz)   12/03/22 0620 95.1 kg (209 lb 10.5 oz)   11/28/22 0458 77.8 kg (171 lb 8.3 oz)   11/26/22 1442 77.1 kg (170 lb)   11/24/22 2148 77.1 kg (170 lb)   10/10/22 1246 87.5 kg (193 lb)   06/27/22 0906 91.3 kg (201 lb 3.2 oz)   03/16/22 1426 93.9 kg (207 lb)   02/23/22 0633 95.3 kg (210 lb 1.6 oz)   02/22/22 0646 92.3 kg (203 lb 7.8 oz)   02/21/22 0530 90.5 kg (199 lb 8.3 oz)   02/20/22 0622 94 kg (207 lb 3.7 oz)   02/19/22 0757 95.7 kg (211 lb)   02/19/22 0600 96 kg (211 lb 10.3 oz)   02/18/22 1134 95.9 kg (211 lb 6.7 oz)      BMI kg/m2 Body mass index is 31.88 kg/m².       Labs        Pertinent Labs    Results from last 7 days   Lab Units 12/06/22  0339 12/05/22  0110 12/04/22  0605   SODIUM mmol/L 153* 149* 146*   POTASSIUM mmol/L 3.5 3.9 4.6   CHLORIDE mmol/L 119* 113* 109*   CO2 mmol/L 23.0 22.0 21.0*   BUN mg/dL 56* 85* 81*   CREATININE mg/dL 1.32* 2.28* 3.31*   CALCIUM mg/dL 9.1 8.5* 8.3*   BILIRUBIN mg/dL 0.5 0.3 0.4   ALK PHOS U/L 125* 111 114   ALT (SGPT) U/L 96* 49* 53*   AST (SGOT) U/L 109* 30 25   GLUCOSE mg/dL 153* 130* 150*     Results from last 7 days   Lab Units 12/06/22  0339 12/05/22  1553 12/05/22  0110 " 12/04/22  1159 12/04/22  0605   MAGNESIUM mg/dL 2.6*  --  2.6*  --  2.4   PHOSPHORUS mg/dL 2.6  --  4.9*  --  5.5*   HEMOGLOBIN g/dL 9.4*   < > 7.4*   < > 8.7*   HEMATOCRIT % 30.0*   < > 22.7*   < > 27.6*    < > = values in this interval not displayed.     COVID19   Date Value Ref Range Status   02/18/2022 Not Detected Not Detected - Ref. Range Final     Lab Results   Component Value Date    HGBA1C 5.4 11/25/2022        Medications    Scheduled Medications atorvastatin, 80 mg, Per G Tube, Nightly  bisacodyl, 10 mg, Rectal, Once  cefepime, 2 g, Intravenous, Q24H  insulin lispro, 2-7 Units, Subcutaneous, Q6H  ipratropium-albuterol, 3 mL, Nebulization, 4x Daily - RT  lidocaine, 1 patch, Transdermal, Q24H  midodrine, 10 mg, Nasogastric, TID AC  sertraline, 50 mg, Per G Tube, Daily  sodium chloride, 10 mL, Intravenous, Q12H        Infusions sodium chloride, 100 mL/hr, Last Rate: 100 mL/hr (12/06/22 0557)        PRN Medications •  bisacodyl  •  dextrose  •  dextrose  •  glucagon (human recombinant)  •  hydrALAZINE  •  labetalol  •  Morphine  •  ondansetron  •  sodium chloride  •  sodium chloride  •  sodium chloride  •  sodium chloride     Physical Findings        Trending Physical   Appearance, NFPE 12/6: NFPE completed and not consistent with nutrition diagnosis of malnutrition at this time using AND/ASPEN criteria     11/26: NFPE completed and not consistent with nutrition diagnosis of malnutrition at this time using AND/ASPEN criteria; but remains high risk   Fat Loss Unable  None  Mild  Moderate Severe   Orbital   X     Upper Arm  X      Thoracic   X      Muscle Loss         Temple    X    Clavicle   X      Acromion   X      Scapular  X       Dorsal Hand   X      Patellar  X      Thigh   X      Calf   X           --  Edema  None documented      Bowel Function Stool Output  Stool Unmeasured Occurrence: 14 (12/06/22 0517)  Bowel Incontinence: Yes (12/06/22 0517)  Stool Amount: smear (12/03/22 0448)      Tubes NG tube is  "in place      Chewing/Swallowing Currently unable to take PO     Skin No pressure injuries       Estimated/Assessed Needs    Re-estimated 12/6   Energy Requirements    Height for Calculation  Height: 172.7 cm (68\")   Weight for Calculation 63.6 kg IBW   Method for Estimation  25-30 kcal/kg   EST Needs (kcal/day) 4288-0958 kcal/day        Protein Requirements    Weight for Calculation 63.6 kg IBW    EST Protein Needs (g/kg) 1.2-1.5 g/kg   EST Daily Needs (g/day) 76-95 g/day       Fluid Requirements     Estimated Needs (mL/day) 1mL/kcal - monitor hydration status        Fluid Deficit    Current Na Level (mEq/L) 153   Desired Na Level (mEq/L) 145   Estimated Fluid Deficit (L)  2.4 L     Current Nutrition Orders & Evaluation of Intake       Oral Nutrition     Food Allergies NKFA   Current PO Diet NPO Diet NPO Type: Strict NPO   Supplement None    PO Evaluation     Trending % PO Intake 11/26: NPO     Enteral Nutrition    Enteral Route NG tube in place    TF Modular None    TF Delivery Method Continuous    Current TF Order Iso 1.5 50 ml/hr    Current Water Flush 50 ml/hr    TF Residual/Tolerance 0 ml    TF Observation  Isosource HN 30ml/hr running per observation        Parenteral Nutrition     TPN Route    Current TPN Order    Lipids (mL/%/frequency)     MVI Frequency     Trace Element Frequency     Total # Days on TPN    TPN Observation         Nutrition Course Details    PO Diets: 11/26-present: NPO since admission   Nutrition Support: 11/26-present: TF     Nutritional Risk Screening        NRS-2002 Score          Nutrition Diagnosis         Nutrition Dx Problem 1 Inadequate oral intake R/t inability to take diet PO secondary to CVA; as evidenced by need for EN.      Nutrition Dx Problem 2        Intervention Goal         Intervention Goal(s) Pt tolerating EN well   Weight stable      Nutrition Intervention        RD Action Will increase TF rate r/t increased (more accurate) wt     Nutrition Prescription          Diet " Prescription NPO   Supplement Prescription None      Enteral Prescription    End Goal:    Isosource 1.5 at 55 mL/hr + 70 mL/hr water flush+ Prosource TF QD     Calories  1815 kcals  40 kcal Prosource TF QD  1855 kcal total (within range)    Protein  82 g  11 g Prosource TF QD  93 g (in range, upper end)    Free water  920 mL    Flushes  1540 mL     The above end goal rate is for 22 hrs/day to assume interruptions for ADLs. Water flushes adjusted based on clinical picture + Rx flushes/IV fluids          TPN Prescription      Monitor/Evaluation        Monitor Pertinent labs, EN delivery/tolerance, Weight, Skin status, GI status, POC/GOC     Electronically signed by:  Mayra Godoy RD  12/06/22 11:00 EST

## 2022-12-06 NOTE — PROGRESS NOTES
General Surgery Progress Note    Name: Cheyenne Estrella ADMIT: 2022   : 1950  PCP: Melba Rosen APRN    MRN: [id number] LOS: 12 days   AGE/SEX: 72 y.o. female  ROOM: [address] [place] [address]    Chief Complaint   Patient presents with   • Speech Problem     Subjective     Received blood transfusion yesterday responded and hemoglobin has been stable since, still appears uncomfortable, afebrile White blood cell count still elevated    Objective     Scheduled Medications:   atorvastatin, 80 mg, Per G Tube, Nightly  bisacodyl, 10 mg, Rectal, Once  cefepime, 2 g, Intravenous, Q24H  insulin lispro, 2-7 Units, Subcutaneous, Q6H  ipratropium-albuterol, 3 mL, Nebulization, 4x Daily - RT  lidocaine, 1 patch, Transdermal, Q24H  midodrine, 10 mg, Nasogastric, TID AC  sertraline, 50 mg, Per G Tube, Daily  sodium chloride, 10 mL, Intravenous, Q12H        Active Infusions:  sodium chloride, 100 mL/hr, Last Rate: 100 mL/hr (22 0557)        As Needed Medications:  •  bisacodyl  •  dextrose  •  dextrose  •  glucagon (human recombinant)  •  hydrALAZINE  •  labetalol  •  Morphine  •  ondansetron  •  sodium chloride  •  sodium chloride  •  sodium chloride  •  sodium chloride    Vital Signs  Vital Signs Patient Vitals for the past 24 hrs:   BP Temp Temp src Pulse Resp SpO2   22 0600 -- -- -- 89 -- 96 %   22 0517 (!) 191/62 98.3 °F (36.8 °C) Axillary 117 24 94 %   22 0500 -- -- -- 118 -- 93 %   22 0400 -- -- -- 118 -- 94 %   22 0310 168/90 98.1 °F (36.7 °C) Axillary 106 23 96 %   22 0300 -- -- -- 104 -- --   22 0200 -- -- -- 119 -- --   22 0100 -- -- -- 109 -- --   22 0000 -- -- -- 97 -- --   22 2300 110/77 100 °F (37.8 °C) Axillary 92 23 --   22 -- -- -- 117 -- --   22 -- -- -- 116 -- 90 %   22 -- -- -- 114 -- 93 %   22 1708 132/55 100.2 °F (37.9 °C) Axillary 115 25 92 %   22 1456 -- -- -- 98 24 95 %   22 1450  -- -- -- 98 24 95 %   12/05/22 1408 122/53 98.7 °F (37.1 °C) -- 114 22 90 %   12/05/22 1349 141/46 98.9 °F (37.2 °C) Axillary 107 20 95 %   12/05/22 1149 -- -- -- -- 24 98 %   12/05/22 1144 -- -- -- 100 24 97 %   12/05/22 1053 124/42 98.2 °F (36.8 °C) Axillary 111 24 --   12/05/22 1022 136/42 98.7 °F (37.1 °C) Axillary 103 24 91 %   12/05/22 0916 136/96 100 °F (37.8 °C) Axillary 100 23 96 %   12/05/22 0713 -- -- -- 109 24 100 %   12/05/22 0708 -- -- -- 98 24 94 %     I/O:  I/O last 3 completed shifts:  In: 1452.9 [I.V.:1030; Blood:422.9]  Out: 2150 [Urine:2150]    Physical Exam:  Physical Exam  Constitutional:       General: She is not in acute distress.     Appearance: Normal appearance. She is not ill-appearing.   HENT:      Head: Normocephalic and atraumatic.      Right Ear: External ear normal.      Left Ear: External ear normal.   Eyes:      Extraocular Movements: Extraocular movements intact.      Conjunctiva/sclera: Conjunctivae normal.   Cardiovascular:      Rate and Rhythm: Normal rate and regular rhythm.   Pulmonary:      Effort: Pulmonary effort is normal. No respiratory distress.   Abdominal:      General: There is no distension.      Palpations: Abdomen is soft.      Comments: Lower abdominal tenderness   Musculoskeletal:         General: No swelling or deformity.   Skin:     General: Skin is warm and dry.   Neurological:      General: No focal deficit present.      Mental Status: She is alert. Mental status is at baseline.         Results Review:     CBC    Results from last 7 days   Lab Units 12/06/22  0339 12/05/22  1553 12/05/22  0110 12/04/22  1801 12/04/22  1159 12/04/22  0605 12/03/22  2357 12/03/22  1110 12/03/22  0336 12/02/22  0331 12/01/22  0319   WBC 10*3/mm3 31.50*  --  29.90*  --  35.70*  --  37.60*  --  27.60* 13.00* 13.30*   HEMOGLOBIN g/dL 9.4* 9.1* 7.4* 8.1* 8.5*  8.8* 8.7* 7.4*   < > 9.3* 12.2 12.2   PLATELETS 10*3/mm3 310  --  250  --  288  --  290  --  360 243 239    < > = values  in this interval not displayed.     BMP   Results from last 7 days   Lab Units 12/06/22  0339 12/05/22  0110 12/04/22  0605 12/03/22  0056 12/02/22  0331 12/01/22  0319 11/30/22  0257   SODIUM mmol/L 153* 149* 146* 149* 147* 147* 142   POTASSIUM mmol/L 3.5 3.9 4.6 4.2 4.0 3.8 3.9   CHLORIDE mmol/L 119* 113* 109* 108* 107 108* 104   CO2 mmol/L 23.0 22.0 21.0* 28.0 30.0* 29.0 28.0   BUN mg/dL 56* 85* 81* 48* 26* 24* 21   CREATININE mg/dL 1.32* 2.28* 3.31* 1.73* 0.75 0.69 0.65   GLUCOSE mg/dL 153* 130* 150* 224* 148* 169* 168*   MAGNESIUM mg/dL 2.6* 2.6* 2.4 2.3 2.3 2.3 2.1   PHOSPHORUS mg/dL 2.6 4.9* 5.5* 5.3* 4.3 3.6 3.5     Radiology(recent) CT Abdomen Pelvis Without Contrast    Result Date: 12/4/2022   1. Left rectus sheath hematoma with hemorrhage extending into the left aspect of the pelvis. The pelvic hematoma is of similar size when compared to previous study. The hemorrhage now demonstrate hyperdense clot. There are also hyperdense clots seen within the left rectus sheath hematoma. There is no contrast enhancement to indicate active bleeding. 2. There is now more diffuse hemorrhage seen within the pelvis slightly increased from the previous exam. 3. No retroperitoneal hemorrhage. 4. Distended gallbladder with cholelithiasis. 5. Colonic diverticulosis without acute diverticulitis. 6. Interval development of a trace left basilar pleural effusion with left lower lobe airspace disease suspicious for pneumonia. There also may be patchy pneumonia in the right lower lobe. 7. Additional findings as noted above.  Electronically Signed By-Krystian Roberts MD On:12/4/2022 6:17 PM This report was finalized on [id number] by  Krystian Roberts MD.    US Renal Bilateral    Result Date: 12/5/2022  1. Mild left renal pelviectasis and mild hydronephrosis similar to prior CT exam. 2. Cortical thinning/scarring at the right kidney. No hydronephrosis on the right. 3. Left lower pelvic hematoma partially imaged.  Electronically Signed  By-Surendra Lin MD On:12/5/2022 8:43 AM This report was finalized on [id number] by  Surendra Lin MD.      I reviewed the patient's new clinical results.    Assessment & Plan       Cerebrovascular accident (CVA), unspecified mechanism (HCC)    Aphasia    Tobacco abuse    Carotid stenosis, bilateral    Essential hypertension    Feeding difficulty      72 y.o. female with retrorectus and pelvic hematoma.  Spontaneous bleed.  Continue to trend hemoglobin, responded to blood it appears as if she is no longer bleeding.  Continue to hold anticoagulation.  Creatinine trending down, okay to remove Williamson today.  White blood cell count still elevated, no fever or hypotension suspected source is pneumonia.  Okay to resume tube feeds.      Documentation and charges entered in a delayed fashion for evaluation and services provided on 12/5/2022  This note was created using Dragon Voice Recognition software.    Landry Rivas MD  12/06/22  06:53 EST

## 2022-12-07 LAB
ALBUMIN SERPL-MCNC: 3 G/DL (ref 3.5–5.2)
ALBUMIN/GLOB SERPL: 0.8 G/DL
ALP SERPL-CCNC: 138 U/L (ref 39–117)
ALT SERPL W P-5'-P-CCNC: 100 U/L (ref 1–33)
ANION GAP SERPL CALCULATED.3IONS-SCNC: 9 MMOL/L (ref 5–15)
AST SERPL-CCNC: 96 U/L (ref 1–32)
BILIRUB SERPL-MCNC: 0.6 MG/DL (ref 0–1.2)
BUN SERPL-MCNC: 32 MG/DL (ref 8–23)
BUN/CREAT SERPL: 35.2 (ref 7–25)
CALCIUM SPEC-SCNC: 8.9 MG/DL (ref 8.6–10.5)
CHLORIDE SERPL-SCNC: 115 MMOL/L (ref 98–107)
CO2 SERPL-SCNC: 27 MMOL/L (ref 22–29)
CREAT SERPL-MCNC: 0.91 MG/DL (ref 0.57–1)
DEPRECATED RDW RBC AUTO: 53.8 FL (ref 37–54)
EGFRCR SERPLBLD CKD-EPI 2021: 66.8 ML/MIN/1.73
ERYTHROCYTE [DISTWIDTH] IN BLOOD BY AUTOMATED COUNT: 15.9 % (ref 12.3–15.4)
GLOBULIN UR ELPH-MCNC: 3.6 GM/DL
GLUCOSE BLDC GLUCOMTR-MCNC: 145 MG/DL (ref 70–105)
GLUCOSE BLDC GLUCOMTR-MCNC: 149 MG/DL (ref 70–105)
GLUCOSE BLDC GLUCOMTR-MCNC: 157 MG/DL (ref 70–105)
GLUCOSE BLDC GLUCOMTR-MCNC: 163 MG/DL (ref 70–105)
GLUCOSE BLDC GLUCOMTR-MCNC: 170 MG/DL (ref 70–105)
GLUCOSE SERPL-MCNC: 153 MG/DL (ref 65–99)
HCT VFR BLD AUTO: 27.8 % (ref 34–46.6)
HGB BLD-MCNC: 9.1 G/DL (ref 12–15.9)
HOLD SPECIMEN: NORMAL
MCH RBC QN AUTO: 29.8 PG (ref 26.6–33)
MCHC RBC AUTO-ENTMCNC: 32.9 G/DL (ref 31.5–35.7)
MCV RBC AUTO: 90.4 FL (ref 79–97)
PLATELET # BLD AUTO: 306 10*3/MM3 (ref 140–450)
PMV BLD AUTO: 10 FL (ref 6–12)
POTASSIUM SERPL-SCNC: 3.6 MMOL/L (ref 3.5–5.2)
PROT SERPL-MCNC: 6.6 G/DL (ref 6–8.5)
RBC # BLD AUTO: 3.07 10*6/MM3 (ref 3.77–5.28)
SODIUM SERPL-SCNC: 151 MMOL/L (ref 136–145)
WBC NRBC COR # BLD: 29.5 10*3/MM3 (ref 3.4–10.8)

## 2022-12-07 PROCEDURE — 80053 COMPREHEN METABOLIC PANEL: CPT | Performed by: FAMILY MEDICINE

## 2022-12-07 PROCEDURE — 85027 COMPLETE CBC AUTOMATED: CPT | Performed by: STUDENT IN AN ORGANIZED HEALTH CARE EDUCATION/TRAINING PROGRAM

## 2022-12-07 PROCEDURE — 97168 OT RE-EVAL EST PLAN CARE: CPT

## 2022-12-07 PROCEDURE — 94664 DEMO&/EVAL PT USE INHALER: CPT

## 2022-12-07 PROCEDURE — 97112 NEUROMUSCULAR REEDUCATION: CPT

## 2022-12-07 PROCEDURE — 94799 UNLISTED PULMONARY SVC/PX: CPT

## 2022-12-07 PROCEDURE — 94761 N-INVAS EAR/PLS OXIMETRY MLT: CPT

## 2022-12-07 PROCEDURE — 97164 PT RE-EVAL EST PLAN CARE: CPT

## 2022-12-07 PROCEDURE — 99231 SBSQ HOSP IP/OBS SF/LOW 25: CPT | Performed by: STUDENT IN AN ORGANIZED HEALTH CARE EDUCATION/TRAINING PROGRAM

## 2022-12-07 PROCEDURE — 25010000002 CEFEPIME PER 500 MG: Performed by: INTERNAL MEDICINE

## 2022-12-07 PROCEDURE — 36415 COLL VENOUS BLD VENIPUNCTURE: CPT | Performed by: STUDENT IN AN ORGANIZED HEALTH CARE EDUCATION/TRAINING PROGRAM

## 2022-12-07 PROCEDURE — 82962 GLUCOSE BLOOD TEST: CPT

## 2022-12-07 PROCEDURE — 97535 SELF CARE MNGMENT TRAINING: CPT

## 2022-12-07 PROCEDURE — 63710000001 INSULIN LISPRO (HUMAN) PER 5 UNITS: Performed by: NURSE PRACTITIONER

## 2022-12-07 RX ORDER — DEXTROSE MONOHYDRATE 50 MG/ML
100 INJECTION, SOLUTION INTRAVENOUS CONTINUOUS
Status: DISPENSED | OUTPATIENT
Start: 2022-12-07 | End: 2022-12-07

## 2022-12-07 RX ORDER — IPRATROPIUM BROMIDE AND ALBUTEROL SULFATE 2.5; .5 MG/3ML; MG/3ML
3 SOLUTION RESPIRATORY (INHALATION)
Status: DISCONTINUED | OUTPATIENT
Start: 2022-12-07 | End: 2022-12-08

## 2022-12-07 RX ADMIN — Medication 10 ML: at 20:25

## 2022-12-07 RX ADMIN — LIDOCAINE 1 PATCH: 50 PATCH CUTANEOUS at 08:32

## 2022-12-07 RX ADMIN — IPRATROPIUM BROMIDE AND ALBUTEROL SULFATE 3 ML: 2.5; .5 SOLUTION RESPIRATORY (INHALATION) at 18:35

## 2022-12-07 RX ADMIN — IPRATROPIUM BROMIDE AND ALBUTEROL SULFATE 3 ML: 2.5; .5 SOLUTION RESPIRATORY (INHALATION) at 07:52

## 2022-12-07 RX ADMIN — INSULIN LISPRO 2 UNITS: 100 INJECTION, SOLUTION INTRAVENOUS; SUBCUTANEOUS at 11:55

## 2022-12-07 RX ADMIN — IPRATROPIUM BROMIDE AND ALBUTEROL SULFATE 3 ML: 2.5; .5 SOLUTION RESPIRATORY (INHALATION) at 11:48

## 2022-12-07 RX ADMIN — INSULIN LISPRO 2 UNITS: 100 INJECTION, SOLUTION INTRAVENOUS; SUBCUTANEOUS at 17:14

## 2022-12-07 RX ADMIN — Medication 10 ML: at 08:34

## 2022-12-07 RX ADMIN — SERTRALINE 50 MG: 50 TABLET, FILM COATED ORAL at 08:33

## 2022-12-07 RX ADMIN — DEXTROSE MONOHYDRATE 100 ML/HR: 50 INJECTION, SOLUTION INTRAVENOUS at 07:51

## 2022-12-07 RX ADMIN — ATORVASTATIN CALCIUM 80 MG: 40 TABLET, FILM COATED ORAL at 20:25

## 2022-12-07 RX ADMIN — INSULIN LISPRO 2 UNITS: 100 INJECTION, SOLUTION INTRAVENOUS; SUBCUTANEOUS at 06:36

## 2022-12-07 RX ADMIN — CEFEPIME 2 G: 2 INJECTION, POWDER, FOR SOLUTION INTRAVENOUS at 15:22

## 2022-12-07 RX ADMIN — LORAZEPAM 0.5 MG: 0.5 TABLET ORAL at 12:08

## 2022-12-07 RX ADMIN — MIDODRINE HYDROCHLORIDE 10 MG: 5 TABLET ORAL at 08:33

## 2022-12-07 NOTE — PROGRESS NOTES
Nutrition Services    Patient Name: Cheyenne Estrella  YOB: 1950  MRN: [id number]  Admission date: 11/24/2022    PPE Documentation        PPE Worn By Provider Did not enter room for this encounter   PPE Worn By Patient  N/A     PROGRESS NOTE      Encounter Information: Progress note to check on TF. Isosource 1.5 running at 55 ml/hr per EMR with minimal residuals.        PO Diet: NPO Diet NPO Type: Strict NPO   PO Supplements: None    PO Intake:  N/a        Current nutrition support: Isosource 1.5 55 ml/hr + 70 ml/hr water flush + Prosource TF QD   Nutrition support review: TF rate increased 12/6 d/t more accurate wt to base needs on       Labs (reviewed below): Hypernatremia, improving, will increase flush to continue improvement         GI Function:  Stool Output  Stool Unmeasured Occurrence: 14 (12/06/22 0517)  Bowel Incontinence: Yes (12/06/22 0517)  Stool Amount: smear (12/03/22 1538)          Nutrition Intervention Updates: Continue Isosource 1.5 55 ml/hr   Continue Prosource TF QD  Increase water flush to 90 ml/hr      Results from last 7 days   Lab Units 12/07/22  1426 12/06/22  0339 12/05/22  0110   SODIUM mmol/L 151* 153* 149*   POTASSIUM mmol/L 3.6 3.5 3.9   CHLORIDE mmol/L 115* 119* 113*   CO2 mmol/L 27.0 23.0 22.0   BUN mg/dL 32* 56* 85*   CREATININE mg/dL 0.91 1.32* 2.28*   CALCIUM mg/dL 8.9 9.1 8.5*   BILIRUBIN mg/dL 0.6 0.5 0.3   ALK PHOS U/L 138* 125* 111   ALT (SGPT) U/L 100* 96* 49*   AST (SGOT) U/L 96* 109* 30   GLUCOSE mg/dL 153* 153* 130*     Results from last 7 days   Lab Units 12/07/22  0348 12/06/22  0339 12/05/22  1553 12/05/22  0110 12/04/22  1159 12/04/22  0605   MAGNESIUM mg/dL  --  2.6*  --  2.6*  --  2.4   PHOSPHORUS mg/dL  --  2.6  --  4.9*  --  5.5*   HEMOGLOBIN g/dL 9.1* 9.4*   < > 7.4*   < > 8.7*   HEMATOCRIT % 27.8* 30.0*   < > 22.7*   < > 27.6*    < > = values in this interval not displayed.     COVID19   Date Value Ref Range Status   02/18/2022 Not Detected Not  Detected - Ref. Range Final     Lab Results   Component Value Date    HGBA1C 5.4 11/25/2022       RD to follow up per protocol.    Electronically signed by:  Mayra Godoy RD  12/07/22 09:13 EST

## 2022-12-07 NOTE — PROGRESS NOTES
Daily Progress Note        Cerebrovascular accident (CVA), unspecified mechanism (HCC)    Aphasia    Obesity (BMI 30-39.9)    Tobacco abuse    Carotid stenosis, bilateral    Essential hypertension    Feeding difficulty    Contrast-induced nephropathy    Acute hypoxemic respiratory failure (HCC)    CAP (community acquired pneumonia)      Assessment    Left lower lobe pneumonia questionable aspiration   cerebrovascular accident (CVA), unspecified mechanism (HCC)  Hypoxia  Bibasilar opacities: Atelectasis versus early pneumonia  Aphasia  KVNG  Spontaneous retrorectal hematoma  Acute anemia due to blood loss in the hematoma  History of tobacco smoking  Bilateral carotid stenosis  Essential hypertension      Recommendations:    oxygen supplement and titration to maintain saturation 90 to 95%  -currently requiring 11 L high flow    Antibiotics: Cefepime started 12/3/2022 and given 1 dose of vancomycin  monitor Neuro status and adjust antibiotic as needed    Aspiration precautions, elevate head of bed  -Tube feeds, surgery following    Bronchodilator  Blood pressure support: Midodrine  Atorvastatin  DVT/GI prophylaxis  Check daily labs and correct electrolytes as needed    Monitor hemoglobin transfuse as needed for hemoglobin less than 7           LOS: 13 days     Subjective         Objective     Vital signs for last 24 hours:  Vitals:    12/07/22 0258 12/07/22 0400 12/07/22 0523 12/07/22 0600   BP: 155/73  149/76    BP Location: Right arm  Right arm    Patient Position: Lying  Lying    Pulse: 112 97 108 90   Resp: 19  25    Temp: 98.2 °F (36.8 °C)  98.2 °F (36.8 °C)    TempSrc: Oral  Oral    SpO2: 97% 95% 95% 96%   Weight:   95 kg (209 lb 7 oz)    Height:           Intake/Output last 3 shifts:  I/O last 3 completed shifts:  In: 422.9 [Blood:422.9]  Out: 3800 [Urine:3800]  Intake/Output this shift:  I/O this shift:  In: -   Out: 500 [Urine:500]      Radiology  Imaging Results (Last 24 Hours)     ** No results found for the  last 24 hours. **          Labs:  Results from last 7 days   Lab Units 12/07/22  0348   WBC 10*3/mm3 29.50*   HEMOGLOBIN g/dL 9.1*   HEMATOCRIT % 27.8*   PLATELETS 10*3/mm3 306     Results from last 7 days   Lab Units 12/06/22  0339   SODIUM mmol/L 153*   POTASSIUM mmol/L 3.5   CHLORIDE mmol/L 119*   CO2 mmol/L 23.0   BUN mg/dL 56*   CREATININE mg/dL 1.32*   CALCIUM mg/dL 9.1   BILIRUBIN mg/dL 0.5   ALK PHOS U/L 125*   ALT (SGPT) U/L 96*   AST (SGOT) U/L 109*   GLUCOSE mg/dL 153*     Results from last 7 days   Lab Units 12/03/22  1314   PH, ARTERIAL pH units 7.443   PO2 ART mm Hg 63.9*   PCO2, ARTERIAL mm Hg 36.6   HCO3 ART mmol/L 25.0     Results from last 7 days   Lab Units 12/06/22  0339 12/05/22  0110 12/04/22  0605   ALBUMIN g/dL 3.10* 2.80* 2.90*         Results from last 7 days   Lab Units 11/30/22  1411   SARAH  Negative     Results from last 7 days   Lab Units 12/06/22  0339   MAGNESIUM mg/dL 2.6*     Results from last 7 days   Lab Units 12/03/22  0336   INR  1.05   APTT seconds 33.1*               Meds:   SCHEDULE  atorvastatin, 80 mg, Per G Tube, Nightly  bisacodyl, 10 mg, Rectal, Once  cefepime, 2 g, Intravenous, Q24H  insulin lispro, 2-7 Units, Subcutaneous, Q6H  ipratropium-albuterol, 3 mL, Nebulization, Q6H While Awake - RT  lidocaine, 1 patch, Transdermal, Q24H  midodrine, 10 mg, Nasogastric, TID AC  sertraline, 50 mg, Per G Tube, Daily  sodium chloride, 10 mL, Intravenous, Q12H      Infusions  sodium chloride, 100 mL/hr, Last Rate: 100 mL/hr (12/06/22 0557)      PRNs  •  bisacodyl  •  dextrose  •  dextrose  •  glucagon (human recombinant)  •  hydrALAZINE  •  labetalol  •  LORazepam  •  Morphine  •  ondansetron  •  sodium chloride  •  sodium chloride  •  sodium chloride  •  sodium chloride    Physical Exam:  Physical Exam  Vitals reviewed.   Constitutional:       Appearance: She is ill-appearing.   Pulmonary:      Breath sounds: Rhonchi and rales present.   Skin:     General: Skin is warm and dry.          ROS  Review of Systems   Unable to perform ROS: Mental status change       I have reviewed the patient's new clinical results.    Electronically signed by Naz Walls MD

## 2022-12-07 NOTE — PROGRESS NOTES
Palliative Care Social Work Progress Note    Code Status:full code    Goals of Care: Full Treatment    Narrative: Palliative care  met with pt to assess for goals of care.  Pt unable to participate in visit, but son and daughter discussed goals.  Son and daughter are interested in referring a hospice agency, list provided by .  Son and daughter plan on waiting to see if pt can improve by the end of the week, but are interested in speaking with a hospice agency before then.  Will await hospice agency choice and seek referral once chosen.  Emotional support provided.              Plan:  -Will continue to follow.          CHENCHO Franco

## 2022-12-07 NOTE — PLAN OF CARE
Goal Outcome Evaluation:Patients family at bedside earlier today. Will continue to monitor.

## 2022-12-07 NOTE — NURSING NOTE
This nurse spoke with the provider Genie Monk. The provider has advised new orders to help with the patients agitation, see MAR. Will continue to update as needed.

## 2022-12-07 NOTE — PROGRESS NOTES
"RENAL/KCC:      LOS: 13 days     Chief Complaint/ Reason for encounter: Acute kidney injury    Subjective   12/07/22 : Chart reviewed.  Noted plans for home with hospice.    Vital Signs  Temp:  [98.2 °F (36.8 °C)-99.3 °F (37.4 °C)] 98.5 °F (36.9 °C)  Heart Rate:  [] 103  Resp:  [19-28] 25  BP: (137-175)/(56-92) 175/71       Wt Readings from Last 1 Encounters:   12/07/22 0523 95 kg (209 lb 7 oz)   12/05/22 0410 95.1 kg (209 lb 10.5 oz)   12/04/22 0548 97 kg (213 lb 13.5 oz)   12/03/22 0620 95.1 kg (209 lb 10.5 oz)   11/28/22 0458 77.8 kg (171 lb 8.3 oz)   11/26/22 1442 77.1 kg (170 lb)   11/24/22 2148 77.1 kg (170 lb)       Objective:  Vital signs: (most recent): Blood pressure 175/71, pulse 103, temperature 98.5 °F (36.9 °C), temperature source Axillary, resp. rate 25, height 172.7 cm (68\"), weight 95 kg (209 lb 7 oz), SpO2 96 %.              Objective:  General Appearance:  Comfortable, confused, ill-appearing, in no acute distress and not in pain.  Awakens to voice but not oriented  HEENT: Mucous membranes moist, no injury, oropharynx clear  Lungs:  Normal effort and normal respiratory rate.  Breath sounds clear to auscultation.  No  respiratory distress.  No rales, decreased breath sounds or rhonchi.    Heart: Normal rate.  Regular rhythm.  S1, S2 normal.  No murmur.   Abdomen: Abdomen is soft.  Bowel sounds are normal, no abdominal tenderness.  There is no rebound or guarding  Extremities: 1+ edema of bilateral lower extremities  Neurological: No focal motor or sensory deficits, pupils reactive  Skin:  Warm and dry.  No rash or cyanosis.       Results Review:    Intake/Output:     Intake/Output Summary (Last 24 hours) at 12/7/2022 1111  Last data filed at 12/7/2022 1021  Gross per 24 hour   Intake 240 ml   Output 2300 ml   Net -2060 ml         DATA:  Radiology and Labs:  The following labs independently reviewed by me. Additional labs ordered for tomorrow a.m.  Interval notes, chart personally reviewed by " me.   Old records independently reviewed showing normal baseline creatinine of 0.6  The following radiologic studies independently viewed by me, findings renal ultrasound showing:Mild left renal pelviectasis and mild hydronephrosis similar to prior  CT exam.  2. Cortical thinning/scarring at the right kidney. No hydronephrosis on  the right.  New problems include KVNG, MARIAH  Discussed with patient and multiple family members at bedside    Risk/ complexity of medical care/ medical decision making high risk, severe acute renal failure, consideration for possible dialysis    Labs:   Recent Results (from the past 24 hour(s))   POC Glucose Once    Collection Time: 12/06/22 12:04 PM    Specimen: Blood   Result Value Ref Range    Glucose 156 (H) 70 - 105 mg/dL   POC Glucose Once    Collection Time: 12/06/22  5:28 PM    Specimen: Blood   Result Value Ref Range    Glucose 147 (H) 70 - 105 mg/dL   POC Glucose Once    Collection Time: 12/07/22 12:40 AM    Specimen: Blood   Result Value Ref Range    Glucose 149 (H) 70 - 105 mg/dL   CBC (No Diff)    Collection Time: 12/07/22  3:48 AM    Specimen: Blood   Result Value Ref Range    WBC 29.50 (H) 3.40 - 10.80 10*3/mm3    RBC 3.07 (L) 3.77 - 5.28 10*6/mm3    Hemoglobin 9.1 (L) 12.0 - 15.9 g/dL    Hematocrit 27.8 (L) 34.0 - 46.6 %    MCV 90.4 79.0 - 97.0 fL    MCH 29.8 26.6 - 33.0 pg    MCHC 32.9 31.5 - 35.7 g/dL    RDW 15.9 (H) 12.3 - 15.4 %    RDW-SD 53.8 37.0 - 54.0 fl    MPV 10.0 6.0 - 12.0 fL    Platelets 306 140 - 450 10*3/mm3   Green Top (Gel)    Collection Time: 12/07/22  3:48 AM   Result Value Ref Range    Extra Tube Hold for add-ons.    POC Glucose Once    Collection Time: 12/07/22  5:51 AM    Specimen: Blood   Result Value Ref Range    Glucose 163 (H) 70 - 105 mg/dL       Radiology:  Pertinent radiology studies were reviewed as described above  Renal ultrasound as detailed above    Assessment:  New acute renal failure, secondary to contrast nephropathy and acute blood loss  anemia, seems to be improving with improved urine output and falling waste products  Acute blood loss anemia  Contrast nephropathy  Rectus sheath hematoma, no need for IR embolization per IR, would be high risk regardless with her resolving renal failure  Altered mental status  Hypernatremia  Worsening anemia  CVA with aphasia  Hypotension on midodrine      Plan:  Renal function has been improving with improved urine output  No significant obstruction on renal ultrasound  Continue midodrine for BP support  On TF's with free water and 1/2NS   Noted plans for home with hospice  Will D/C IVF      Catalino Kapadia MD  [facility]   Office phone number: [phone]  Answering service phone number: [phone]  12/07/22  11:11 EST

## 2022-12-07 NOTE — PROGRESS NOTES
[facility] Medicine Services Daily Progress Note    Patient Name: Cheyenne Estrella  : 1950  MRN: [id number]  Primary Care Physician:  Melba Rosen APRN  Date of admission: 2022      Subjective      Chief Complaint: Severe aphasia     No acute concerns overnight.  Remains unengaged and restless.  Ongoing soft right hand restraint to avoid pulling her lines and feeding tube.  Family friend was at the bedside during my visit.  Family is considering hospice agencies.  She remains on 11 L high flow nasal cannula with tachycardia and slightly improved renal function.  She is being followed by palliative care, nephrology, surgery, GI, and pulmonology.  She is definitely an appropriate candidate for comfort care or hospice.      Bedside rounding completed with the nursing staff. No other acute concerns.    ROS negative except as above       Objective      Vitals:   Temp:  [98.2 °F (36.8 °C)-99.3 °F (37.4 °C)] 98.2 °F (36.8 °C)  Heart Rate:  [] 101  Resp:  [19-28] 20  BP: (137-181)/(56-92) 149/76  Flow (L/min):  [11] 11       Physical Exam  Vitals reviewed.   Constitutional:       Comments: No family at the bedside     HENT:      Head: Normocephalic.      Nose: Nose normal--Dobbhoff in place.      Mouth/Throat:      Mouth: Mucous membranes are moist.   Cardiovascular:      Rate and Rhythm: Normal rate and regular rhythm.      Pulses: Normal pulses.      Heart sounds: Normal heart sounds.   Pulmonary:      Effort: Pulmonary effort is normal.      Breath sounds: Normal breath sounds.   Abdominal:      General: Abdomen is flat.      Palpations: Abdomen is soft.   Musculoskeletal:         General: Normal range of motion.      Cervical back: Normal range of motion.   Skin:     General: Skin is warm.   Neurological:      General: No focal deficit present.      Mental Status: Fairly unresponsive, no meaningful interaction.      Comments: Patient is aphasic   Psychiatric:          Mood and Affect: Flat affect     Result Review    Result Review:  I have personally reviewed the results from the time of this admission to 12/7/2022 09:32 EST and agree with these findings:  [x]  Laboratory  [x]  Microbiology  [x]  Radiology  [x]  EKG/Telemetry   [x]  Cardiology/Vascular   []  Pathology  [x]  Old records  []  Other:  Assessment & Plan       Brief Patient Summary:  Cheyenne Estrella is a 72 y.o. female with PMHx of HTN, HLD, anxiety, CVA who presented to [facility] on 11/24/2022 complaining of aphasia.  Due to condition HPI obtained from family at bedside and available records.  LWK 1730. Patient started slurring speech on phone with family and EMS called.  Patient then became completely aphasic.  Denies chest pain, dyspnea, fevers, chills, numbness, tingling, or gait instability.  Presented to [facility] ED due to possibility of stroke     In the ED, vitals 98.3, HR 79, RR 24, /67, 91% RA.  Labs notable for troponin <0.01, glucose 110.  Stroke team called.  Pt outside window for Tpa.  CT head showed no acute abnormality but did show remote infarct in left basal ganglia extending into the left corona radiata along with small remote infarcts in the left thalamus and right basal ganglia.  CTA head/neck pending.  Patient admitted for neurological evaluation.        Active Hospital Problems:  Patient Active Problem List   Diagnosis   • Aphasia   • Obesity (BMI 30-39.9)   • Oral thrush   • Tobacco abuse   • Encephalopathy   • CVA (cerebral vascular accident) (HCC)   • Cerebrovascular accident (CVA), unspecified mechanism (HCC)   • Carotid stenosis, bilateral   • Essential hypertension   • Feeding difficulty   • Contrast-induced nephropathy   • Acute hypoxemic respiratory failure (HCC)   • CAP (community acquired pneumonia)      Plan:   Rectus sheath hematoma  Monitor H&H closely  Transfuse if less than 8.    Another unit of blood ordered December 5   GI will not place feeding tube  due to bleeding risk     Aphasia-secondary to underlying infarct, possibility of evolving state.  Patient was established history of previous CVA within the last year was still in the process of rehabbing.  Patient's granddaughter reports patient recently lost her spouse and had great deal of emotional stress, appears stable but not improving  -Neurology evaluated concern for progression or worsening  -Repeat MRI showing new areas of acute infarct in right precentral gyrus cortex and within inferior right frontal lobe  -CTA showing 60% stenosis of right CCA 50% stenosis of left CCA but not actionable at this time  -ED evaluated not criteria for tPA due to questionable timeframe  -Antiplatelet  -Echo showing normal EF no discussion of shunting  -PT/OT/speech  -May need discussion of PEG tube, family aware and on Dobhoff NG tube feeds  -B12 and thyroid function ordered  -Given underlying carotid disease vascular surgery consulted recommending medical management at this time can follow-up in clinic for further discussion  -Continue speech therapy  -Repeat CT of the head ordered for persistent lethargy.    No acute findings.  -Patient agreed to PEG tube placement and rehab on November 30.  GI consulted. Deferred due to spontaneous bleeding resulting in abdominal wall hematoma    Leukocytosis-left-sided pneumonia on imaging.  Likely reactive as well     Hypertension/hyperlipidemia/anxiety-chronic in nature  -Allow for permissive hypertension  -Resume home medication as clinically appropriate     Acute renal failure  Likely due to hypotension due to hemorrhage and contrast induced injury  Nephrology consulted     Acute respiratory failure  ABG shows hypoxemia with PO2 in the 60s  Pulmonary consulted  Chest x-ray ordered shows left-sided consolidation/pneumonia. Supplemental oxygen and pulmonary consult ordered  Continuing treatment for pneumonia.  She has not had much in the way of clinical improvement.  Given likely  upcoming hospice transition, aggressive interventions or consideration for sepsis would not be appropriate.     Persistent neck pain lidocaine patch ordered.  CT soft tissue neck negative     DVT prophylaxis:  Mechanical DVT prophylaxis orders are present  She has not been on chemical DVT prophylaxis due to spontaneous bleeding and resolving hematoma.  There is currently no indication to transition her to anything from this standpoint given that she will be transitioning to hospice soon.    CODE STATUS:    Code Status (Patient has no pulse and is not breathing): CPR (Attempt to Resuscitate)  Medical Interventions (Patient has pulse or is breathing): Full Support  Release to patient: Routine Release     FMLA paperwork has been completed for patient's daughter     Disposition: Palliative care following, family deciding on hospice agencies with plans to DC home on hospice.    Electronically signed by Ok Scott MD, 12/07/22, 09:32 EST.  [facility] Hospitalist Team

## 2022-12-07 NOTE — NURSING NOTE
Spoke with Dr Scott through chat as patient is triggering positive for sepsis and has been more than 48 hours since lactate and blood cultures done. Already on Cefepime for pneumonia. MD says no new orders patient most likely going hospice care.

## 2022-12-07 NOTE — THERAPY RE-EVALUATION
Patient Name: Cheyenne Estrella  : 1950    MRN: [id number]                              Today's Date: 2022       Admit Date: 2022    Visit Dx:     ICD-10-CM ICD-9-CM   1. Cerebrovascular accident (CVA), unspecified mechanism (HCC)  I63.9 434.91   2. Feeding difficulty  R63.30 783.3     Patient Active Problem List   Diagnosis   • Aphasia   • Obesity (BMI 30-39.9)   • Oral thrush   • Tobacco abuse   • Encephalopathy   • CVA (cerebral vascular accident) (HCC)   • Cerebrovascular accident (CVA), unspecified mechanism (HCC)   • Carotid stenosis, bilateral   • Essential hypertension   • Feeding difficulty   • Contrast-induced nephropathy   • Acute hypoxemic respiratory failure (HCC)   • CAP (community acquired pneumonia)     Past Medical History:   Diagnosis Date   • CAP (community acquired pneumonia) 2022   • Contrast-induced nephropathy 2022   • Depression    • Hypertension    • Tobacco abuse      Past Surgical History:   Procedure Laterality Date   • KNEE SURGERY        General Information     Row Name 22 1122          OT Time and Intention    Document Type re-evaluation  Pt with significant decline since original OT eval  -SB     Mode of Treatment occupational therapy  -SB     Row Name 22 1122          General Information    Patient Profile Reviewed yes  -SB     Prior Level of Function independent:;ADL's;all household mobility  -SB     Existing Precautions/Restrictions fall  -SB     Barriers to Rehab cognitive status;medically complex;language barrier  -SB     Row Name 22 1122          Occupational Profile    Reason for Services/Referral (Occupational Profile) Pt is a 71 y/o female presenting to ER on  with c/o slurred speech with complete aphasia. Pt developed L sided facial paralysis with L tongue deviation in ER. MRI of brain ordered/pending. PMhx: [facility]. Pt initially evaluated in ED on . Pt has demo'd significant decline in functional status since inital  eval. Pt found to have rectal sheath hematoma requiring PRBC. Pt continues to demo significant aphasia and has now developed L sided weakness, abnormal tone, and is only following limited commands.  -SB     Row Name 12/07/22 1122          Living Environment    People in Home alone  -SB     Row Name 12/07/22 1122          Home Main Entrance    Number of Stairs, Main Entrance two  -SB     Stair Railings, Main Entrance none  -SB     Row Name 12/07/22 1122          Stairs Within Home, Primary    Number of Stairs, Within Home, Primary none  -SB     Row Name 12/07/22 1122          Cognition    Orientation Status (Cognition) unable/difficult to assess;other (see comments)  -SB     Row Name 12/07/22 1122          Safety Issues, Functional Mobility    Impairments Affecting Function (Mobility) balance;strength;range of motion (ROM);grasp;motor control;motor planning;muscle tone abnormal;endurance/activity tolerance;coordination  -SB           User Key  (r) = Recorded By, (t) = Taken By, (c) = Cosigned By    Initials Name Provider Type    Dawn Valiente OT Occupational Therapist                 Mobility/ADL's     Row Name 12/07/22 1128          Bed Mobility    Comment, (Bed Mobility) Pt in RUE restraint at time of re-eval. Pt unable to completed bed mobility this date due to restraint and limited command following.  -SB     Row Name 12/07/22 1128          Activities of Daily Living    BADL Assessment/Intervention grooming  -SB     Row Name 12/07/22 1128          Grooming Assessment/Training    [place] Level (Grooming) grooming skills;wash face, hands;dependent (less than 25% patient effort)  -SB     Position (Grooming) sitting up in bed  -SB           User Key  (r) = Recorded By, (t) = Taken By, (c) = Cosigned By    Initials Name Provider Type    Dawn Valiente OT Occupational Therapist               Obj/Interventions     Row Name 12/07/22 1131          Sensory Assessment (Somatosensory)    Sensory Assessment  (Somatosensory) unable/difficult to assess  -SB     Row Name 12/07/22 1131          Vision Assessment/Intervention    Visual Impairment/Limitations unable/difficult to assess  -SB     Vision Assessment Comment Pt demo'ing some L sided neglect with limited scanning to L side  -SB     Row Name 12/07/22 1131          Range of Motion Comprehensive    General Range of Motion upper extremity range of motion deficits identified  -SB     Comment, General Range of Motion RUE WFL, LUE grossly impaired. PROM only with L shoulder/elbow/wrist/digits. Abnormal tone noted in LUE. Rolled wash cloth placed in hand to prevent excess flexion of digits  -SB     Row Name 12/07/22 1131          Strength Comprehensive (MMT)    General Manual Muscle Testing (MMT) Assessment upper extremity strength deficits identified  -SB     Comment, General Manual Muscle Testing (MMT) Assessment RUE WFL, LUE grossly impaired 2+/5  -SB     Row Name 12/07/22 1131          Motor Skills    Motor Skills coordination  -SB     Coordination fine motor deficit;left;upper extremity;severe impairment  -SB           User Key  (r) = Recorded By, (t) = Taken By, (c) = Cosigned By    Initials Name Provider Type    SB Dawn Almeida, OT Occupational Therapist               Goals/Plan     Row Name 12/07/22 1138          Transfer Goal 1 (OT)    Activity/Assistive Device (Transfer Goal 1, OT) transfers, all  -SB     [place] Level/Cues Needed (Transfer Goal 1, OT) moderate assist (50-74% patient effort)  -SB     Time Frame (Transfer Goal 1, OT) 2 weeks  -SB     Row Name 12/07/22 1138          Dressing Goal 1 (OT)    Activity/Device (Dressing Goal 1, OT) upper body dressing  -SB     [place]/Cues Needed (Dressing Goal 1, OT) moderate assist (50-74% patient effort)  -SB     Time Frame (Dressing Goal 1, OT) 2 weeks  -SB     Row Name 12/07/22 1138          Grooming Goal 1 (OT)    Activity/Device (Grooming Goal 1, OT) grooming skills, all  -SB     [place] (Grooming  Goal 1, OT) moderate assist (50-74% patient effort)  -SB     Time Frame (Grooming Goal 1, OT) 2 weeks  -SB     Row Name 12/07/22 1138          Therapy Assessment/Plan (OT)    Planned Therapy Interventions (OT) activity tolerance training;adaptive equipment training;cognitive/visual perception retraining;BADL retraining;functional balance retraining;IADL retraining;neuromuscular control/coordination retraining;manual therapy/joint mobilization;occupation/activity based interventions;passive ROM/stretching;patient/caregiver education/training;transfer/mobility retraining;strengthening exercise;ROM/therapeutic exercise  -SB           User Key  (r) = Recorded By, (t) = Taken By, (c) = Cosigned By    Initials Name Provider Type    SB Dawn Almeida, MICHELLE Occupational Therapist               Clinical Impression     Row Name 12/07/22 1133          Pain Scale: FACES Pre/Post-Treatment    Pain: FACES Scale, Pretreatment 0-->no hurt  -SB     Posttreatment Pain Rating 2-->hurts little bit  -SB     Row Name 12/07/22 1137          Plan of Care Review    Plan of Care Reviewed With patient;son;friend  -SB     Progress no change  -SB     Outcome Evaluation Pt is a 71 y/o female presenting to ER on 11/24 with c/o slurred speech with complete aphasia. Pt developed L sided facial paralysis with L tongue deviation in ER. MRI of brain ordered/pending. PMhx: [facility]. Pt demo'ing significant decline since original eval on 11/25. Pt now demo'ing decreased command following, decreased use of LUE in all functional planes, and increased tone in LUE. Pt in RUE restraint at time of re-eval due to pt pulling at NG tube. Pt demo'ing RUE ROM/strength WFL. Pt required total assist to wash face with wash cloth. Max-A for rolling from S>L side for pillow placement. Pt also demo'ing L neglect with increased lateral neck flexion to the R.  -SB     Row Name 12/07/22 7457          Therapy Assessment/Plan (OT)    Rehab Potential (OT) good, to achieve stated  therapy goals  -SB     Criteria for Skilled Therapeutic Interventions Met (OT) yes;skilled treatment is necessary  -SB     Therapy Frequency (OT) 3 times/wk  -SB     Predicted Duration of Therapy Intervention (OT) until d/c  -SB     Row Name 12/07/22 1133          Therapy Plan Review/Discharge Plan (OT)    Anticipated Discharge Disposition (OT) skilled nursing facility  -SB     Row Name 12/07/22 1133          Positioning and Restraints    Pre-Treatment Position in bed  -SB     Post Treatment Position bed  -SB     In Bed notified nsg;supine;call light within reach;encouraged to call for assist;with family/caregiver  -SB           User Key  (r) = Recorded By, (t) = Taken By, (c) = Cosigned By    Initials Name Provider Type    SB Dawn Almeida, OT Occupational Therapist               Outcome Measures     Row Name 12/07/22 1138          How much help from another is currently needed...    Putting on and taking off regular lower body clothing? 1  -SB     Bathing (including washing, rinsing, and drying) 1  -SB     Toileting (which includes using toilet bed pan or urinal) 1  -SB     Putting on and taking off regular upper body clothing 1  -SB     Taking care of personal grooming (such as brushing teeth) 2  -SB     Eating meals 1  -SB     AM-PAC 6 Clicks Score (OT) 7  -SB     Row Name 12/07/22 0800          How much help from another person do you currently need...    Turning from your back to your side while in flat bed without using bedrails? 1  -AT     Moving from lying on back to sitting on the side of a flat bed without bedrails? 1  -AT     Moving to and from a bed to a chair (including a wheelchair)? 1  -AT     Standing up from a chair using your arms (e.g., wheelchair, bedside chair)? 1  -AT     Climbing 3-5 steps with a railing? 1  -AT     To walk in hospital room? 1  -AT     AM-PAC 6 Clicks Score (PT) 6  -AT     Highest level of mobility 2 --> Bed activities/dependent transfer  -AT     Row Name 12/07/22 1136           Modified [place] Scale    Pre-Stroke Modified [place] Scale 1 - No significant disability despite symptoms.  Able to carry out all usual duties and activities.  -SB     Modified [place] Scale 5 - Severe disability.  Bedridden, incontinent, and requiring constant nursing care and attention.  -SB     Row Name 12/07/22 1138          Functional Assessment    Outcome Measure Options AM-PAC 6 Clicks Daily Activity (OT);Modified [place]  -SB           User Key  (r) = Recorded By, (t) = Taken By, (c) = Cosigned By    Initials Name Provider Type    AT Shira Ro LPN Licensed Nurse    Dawn Valiente OT Occupational Therapist                Occupational Therapy Education     Title: PT OT SLP Therapies (Done)     Topic: Occupational Therapy (Done)     Point: ADL training (Done)     Description:   Instruct learner(s) on proper safety adaptation and remediation techniques during self care or transfers.   Instruct in proper use of assistive devices.              Learning Progress Summary           Patient Acceptance, E, VU by SB at 12/7/2022 1139    Acceptance, E, VU by AT at 12/7/2022 1034    Eager, E, VU by HR at 12/6/2022 1810    Acceptance, E,TB, VU,DU by JS at 12/4/2022 1800    Acceptance, E,TB, VU,DU by JS at 11/30/2022 1751    Acceptance, E,TB, VU,DU by JS at 11/27/2022 1825    Acceptance, E, DU by AS at 11/26/2022 0906    Acceptance, E, VU by SB at 11/25/2022 1051   Family Acceptance, E, VU by AT at 12/7/2022 1034    Acceptance, E,TB, VU,DU by JS at 12/4/2022 1800    Acceptance, E,TB, VU,DU by JS at 11/30/2022 1751    Acceptance, E,TB, VU,DU by JS at 11/27/2022 1825                   Point: Home exercise program (Done)     Description:   Instruct learner(s) on appropriate technique for monitoring, assisting and/or progressing therapeutic exercises/activities.              Learning Progress Summary           Patient Acceptance, E, VU by SB at 12/7/2022 1139    Acceptance, E, VU by AT at 12/7/2022 1034    Eager, E, VU by  HR at 12/6/2022 1810    Acceptance, E,TB, VU,DU by JS at 12/4/2022 1800    Acceptance, E,TB, VU,DU by JS at 11/30/2022 1751    Acceptance, E,TB, VU,DU by JS at 11/27/2022 1825    Acceptance, E, DU by AS at 11/26/2022 0906    Acceptance, E, VU by SB at 11/25/2022 1051   Family Acceptance, E, VU by AT at 12/7/2022 1034    Acceptance, E,TB, VU,DU by JS at 12/4/2022 1800    Acceptance, E,TB, VU,DU by JS at 11/30/2022 1751    Acceptance, E,TB, VU,DU by JS at 11/27/2022 1825                   Point: Precautions (Done)     Description:   Instruct learner(s) on prescribed precautions during self-care and functional transfers.              Learning Progress Summary           Patient Acceptance, E, VU by SB at 12/7/2022 1139    Acceptance, E, VU by AT at 12/7/2022 1034    Eager, E, VU by HR at 12/6/2022 1810    Acceptance, E,TB, VU,DU by JS at 12/4/2022 1800    Acceptance, E,TB, VU,DU by JS at 11/30/2022 1751    Acceptance, E,TB, VU,DU by JS at 11/27/2022 1825    Acceptance, E, DU by AS at 11/26/2022 0906    Acceptance, E, VU by SB at 11/25/2022 1051   Family Acceptance, E, VU by AT at 12/7/2022 1034    Acceptance, E,TB, VU,DU by JS at 12/4/2022 1800    Acceptance, E,TB, VU,DU by JS at 11/30/2022 1751    Acceptance, E,TB, VU,DU by JS at 11/27/2022 1825                   Point: Body mechanics (Done)     Description:   Instruct learner(s) on proper positioning and spine alignment during self-care, functional mobility activities and/or exercises.              Learning Progress Summary           Patient Acceptance, E, VU by SB at 12/7/2022 1139    Acceptance, E, VU by AT at 12/7/2022 1034    Eager, E, VU by HR at 12/6/2022 1810    Acceptance, E,TB, VU,DU by JS at 12/4/2022 1800    Acceptance, E,TB, VU,DU by JS at 11/30/2022 1751    Acceptance, E,TB, VU,DU by JS at 11/27/2022 1825    Acceptance, E, DU by AS at 11/26/2022 0906    Acceptance, E, VU by SB at 11/25/2022 1051   Family Acceptance, E, VU by AT at 12/7/2022 1034     Acceptance, E,TB, VU,DU by SHOLA at 12/4/2022 1800    Acceptance, E,TB, VU,DU by JS at 11/30/2022 1751    Acceptance, E,TB, VU,DU by JS at 11/27/2022 1825                               User Key     Initials Effective Dates Name Provider Type Discipline    AS 09/29/21 -  Alicia Ceballos RN Registered Nurse Nurse    HR 08/12/22 -  Radha Bolivar, RN Registered Nurse Nurse    JS 05/24/22 -  Verito Giron LPN Licensed Nurse Nurse    AT 05/24/22 -  Shira Ro LPN Licensed Nurse Nurse     06/07/22 -  Dawn Almeida OT Occupational Therapist OT              OT Recommendation and Plan  Planned Therapy Interventions (OT): activity tolerance training, adaptive equipment training, cognitive/visual perception retraining, BADL retraining, functional balance retraining, IADL retraining, neuromuscular control/coordination retraining, manual therapy/joint mobilization, occupation/activity based interventions, passive ROM/stretching, patient/caregiver education/training, transfer/mobility retraining, strengthening exercise, ROM/therapeutic exercise  Therapy Frequency (OT): 3 times/wk  Plan of Care Review  Plan of Care Reviewed With: patient, son, friend  Progress: no change  Outcome Evaluation: Pt is a 71 y/o female presenting to ER on 11/24 with c/o slurred speech with complete aphasia. Pt developed L sided facial paralysis with L tongue deviation in ER. MRI of brain ordered/pending. PMhx: H. Pt demo'ing significant decline since original eval on 11/25. Pt now demo'ing decreased command following, decreased use of LUE in all functional planes, and increased tone in LUE. Pt in RUE restraint at time of re-eval due to pt pulling at NG tube. Pt demo'ing RUE ROM/strength WFL. Pt required total assist to wash face with wash cloth. Max-A for rolling from S>L side for pillow placement. Pt also demo'ing L neglect with increased lateral neck flexion to the R.     Moderate Intensity Therapy recommended post-acute care. This is  "recommended as therapy feels the patient would require 3-4 days per week and wouldn't tolerate \"3 hour daily\" rehab intensity. SNF would be the preferred choice. If the patient does not agree to SNF, arrange HH or OP depending on home bound status. If patient is medically complex, consider LTACH.        Time Calculation:    Time Calculation- OT     Row Name 12/07/22 1140             Time Calculation- OT    OT Start Time 0955  -SB      OT Stop Time 1015  -SB      OT Time Calculation (min) 20 min  -SB      Total Timed Code Minutes- OT 20 minute(s)  -SB      OT Received On 12/07/22  -SB      OT - Next Appointment 12/09/22  -SB      OT Goal Re-Cert Due Date 12/21/22  -SB            User Key  (r) = Recorded By, (t) = Taken By, (c) = Cosigned By    Initials Name Provider Type    Dawn Valiente OT Occupational Therapist              Therapy Charges for Today     Code Description Service Date Service Provider Modifiers Qty    [id number]  OT RE-EVAL 2 12/7/2022 Dawn Almeida OT GO 1    [id number]  OT SELF CARE/MGMT/TRAIN EA 15 MIN 12/7/2022 Dawn Almeida OT GO 1               Dawn Almeida OT  12/7/2022  "

## 2022-12-07 NOTE — PLAN OF CARE
"Goal Outcome Evaluation:  Plan of Care Reviewed With: patient, son, friend        Progress: no change  Outcome Evaluation: Pt is a 71 y/o female presenting to ER on 11/24 with c/o slurred speech with complete aphasia. Pt developed L sided facial paralysis with L tongue deviation in ER. MRI of brain ordered/pending. PMhx: [facility]. Pt demo'ing significant decline since original eval on 11/25. Pt now demo'ing decreased command following, decreased use of LUE in all functional planes, and increased tone in LUE. Pt in RUE restraint at time of re-eval due to pt pulling at NG tube. Pt demo'ing RUE ROM/strength WFL. Pt required total assist to wash face with wash cloth. Max-A for rolling from S>L side for pillow placement. Pt also demo'ing L neglect with increased lateral neck flexion to the R.    Moderate Intensity Therapy recommended post-acute care. This is recommended as therapy feels the patient would require 3-4 days per week and wouldn't tolerate \"3 hour daily\" rehab intensity. SNF would be the preferred choice. If the patient does not agree to SNF, arrange HH or OP depending on home bound status. If patient is medically complex, consider LTACH.     "

## 2022-12-07 NOTE — PLAN OF CARE
Problem: Adult Inpatient Plan of Care  Goal: Plan of Care Review  Outcome: Ongoing, Progressing  Goal: Patient-Specific Goal (Individualized)  Outcome: Ongoing, Progressing  Goal: Absence of Hospital-Acquired Illness or Injury  Outcome: Ongoing, Progressing  Intervention: Identify and Manage Fall Risk  Recent Flowsheet Documentation  Taken 12/7/2022 0400 by Kia Cardenas RN  Safety Promotion/Fall Prevention:   safety round/check completed   room organization consistent   nonskid shoes/slippers when out of bed   lighting adjusted   fall prevention program maintained   clutter free environment maintained   assistive device/personal items within reach  Taken 12/7/2022 0200 by Kia Cardenas RN  Safety Promotion/Fall Prevention:   safety round/check completed   room organization consistent   nonskid shoes/slippers when out of bed   lighting adjusted   fall prevention program maintained   clutter free environment maintained   assistive device/personal items within reach  Taken 12/6/2022 2200 by Kia Cardenas RN  Safety Promotion/Fall Prevention:   safety round/check completed   room organization consistent   nonskid shoes/slippers when out of bed   lighting adjusted   fall prevention program maintained   clutter free environment maintained   assistive device/personal items within reach  Taken 12/6/2022 1948 by Kia Cardenas RN  Safety Promotion/Fall Prevention:   safety round/check completed   room organization consistent   nonskid shoes/slippers when out of bed   lighting adjusted   fall prevention program maintained   clutter free environment maintained   assistive device/personal items within reach  Intervention: Prevent and Manage VTE (Venous Thromboembolism) Risk  Recent Flowsheet Documentation  Taken 12/7/2022 0400 by Kia Cardenas RN  VTE Prevention/Management: sequential compression devices off  Taken 12/6/2022 2353 by Kia Cardenas RN  VTE Prevention/Management: sequential compression devices  off  Taken 12/6/2022 1948 by Kia Cardenas RN  VTE Prevention/Management: sequential compression devices on  Intervention: Prevent Infection  Recent Flowsheet Documentation  Taken 12/7/2022 0400 by Kia Cardenas RN  Infection Prevention:   single patient room provided   rest/sleep promoted   hand hygiene promoted  Taken 12/7/2022 0200 by Kia Cardenas RN  Infection Prevention:   single patient room provided   rest/sleep promoted   hand hygiene promoted  Taken 12/6/2022 2200 by Kia Cardenas RN  Infection Prevention:   single patient room provided   rest/sleep promoted   hand hygiene promoted  Taken 12/6/2022 1948 by Kia Cardenas RN  Infection Prevention:   single patient room provided   rest/sleep promoted   hand hygiene promoted  Goal: Optimal Comfort and Wellbeing  Outcome: Ongoing, Progressing  Intervention: Provide Person-Centered Care  Recent Flowsheet Documentation  Taken 12/7/2022 0400 by Kia Cardenas RN  Trust Relationship/Rapport: (pt sleeping) other (see comments)  Taken 12/6/2022 2353 by Kia Cardenas RN  Trust Relationship/Rapport: (pt sleeping) other (see comments)  Taken 12/6/2022 1948 by Kia Cardenas RN  Trust Relationship/Rapport:   care explained   emotional support provided   reassurance provided  Goal: Readiness for Transition of Care  Outcome: Ongoing, Progressing     Problem: Adjustment to Illness (Stroke, Ischemic/Transient Ischemic Attack)  Goal: Optimal Coping  Outcome: Ongoing, Progressing     Problem: Bowel Elimination Impaired (Stroke, Ischemic/Transient Ischemic Attack)  Goal: Effective Bowel Elimination  Outcome: Ongoing, Progressing     Problem: Cerebral Tissue Perfusion (Stroke, Ischemic/Transient Ischemic Attack)  Goal: Optimal Cerebral Tissue Perfusion  Outcome: Ongoing, Progressing     Problem: Cognitive Impairment (Stroke, Ischemic/Transient Ischemic Attack)  Goal: Optimal Cognitive Function  Outcome: Ongoing, Progressing     Problem: Communication  Impairment (Stroke, Ischemic/Transient Ischemic Attack)  Goal: Improved Communication Skills  Outcome: Ongoing, Progressing     Problem: Functional Ability Impaired (Stroke, Ischemic/Transient Ischemic Attack)  Goal: Optimal Functional Ability  Outcome: Ongoing, Progressing     Problem: Respiratory Compromise (Stroke, Ischemic/Transient Ischemic Attack)  Goal: Effective Oxygenation and Ventilation  Outcome: Ongoing, Progressing     Problem: Sensorimotor Impairment (Stroke, Ischemic/Transient Ischemic Attack)  Goal: Improved Sensorimotor Function  Outcome: Ongoing, Progressing     Problem: Swallowing Impairment (Stroke, Ischemic/Transient Ischemic Attack)  Goal: Optimal Eating and Swallowing without Aspiration  Outcome: Ongoing, Progressing     Problem: Urinary Elimination Impaired (Stroke, Ischemic/Transient Ischemic Attack)  Goal: Effective Urinary Elimination  Outcome: Ongoing, Progressing     Problem: Asthma Comorbidity  Goal: Maintenance of Asthma Control  Outcome: Ongoing, Progressing  Intervention: Maintain Asthma Symptom Control  Recent Flowsheet Documentation  Taken 12/7/2022 0400 by Kia Cardenas RN  Medication Review/Management: medications reviewed  Taken 12/7/2022 0200 by Kia Cardenas RN  Medication Review/Management: medications reviewed  Taken 12/6/2022 2200 by Kia Cardenas RN  Medication Review/Management: medications reviewed  Taken 12/6/2022 1948 by Kia Cardenas RN  Medication Review/Management: medications reviewed     Problem: Behavioral Health Comorbidity  Goal: Maintenance of Behavioral Health Symptom Control  Outcome: Ongoing, Progressing  Intervention: Maintain Behavioral Health Symptom Control  Recent Flowsheet Documentation  Taken 12/7/2022 0400 by Kia Cardenas RN  Medication Review/Management: medications reviewed  Taken 12/7/2022 0200 by Kia Cardenas RN  Medication Review/Management: medications reviewed  Taken 12/6/2022 2200 by Kia Cardenas RN  Medication  Review/Management: medications reviewed  Taken 12/6/2022 1948 by Kia Cardenas RN  Medication Review/Management: medications reviewed     Problem: COPD (Chronic Obstructive Pulmonary Disease) Comorbidity  Goal: Maintenance of COPD Symptom Control  Outcome: Ongoing, Progressing  Intervention: Maintain COPD-Symptom Control  Recent Flowsheet Documentation  Taken 12/7/2022 0400 by Kia Cardenas RN  Medication Review/Management: medications reviewed  Taken 12/7/2022 0200 by Kia Cardenas RN  Medication Review/Management: medications reviewed  Taken 12/6/2022 2200 by Kia Cardenas RN  Medication Review/Management: medications reviewed  Taken 12/6/2022 1948 by Kia Cardenas RN  Medication Review/Management: medications reviewed     Problem: Diabetes Comorbidity  Goal: Blood Glucose Level Within Targeted Range  Outcome: Ongoing, Progressing     Problem: Heart Failure Comorbidity  Goal: Maintenance of Heart Failure Symptom Control  Outcome: Ongoing, Progressing  Intervention: Maintain Heart Failure-Management  Recent Flowsheet Documentation  Taken 12/7/2022 0400 by Kia Cardenas RN  Medication Review/Management: medications reviewed  Taken 12/7/2022 0200 by Kia Cardenas RN  Medication Review/Management: medications reviewed  Taken 12/6/2022 2200 by Kia Cardenas RN  Medication Review/Management: medications reviewed  Taken 12/6/2022 1948 by Kia Cardenas RN  Medication Review/Management: medications reviewed     Problem: Hypertension Comorbidity  Goal: Blood Pressure in Desired Range  Outcome: Ongoing, Progressing  Intervention: Maintain Blood Pressure Management  Recent Flowsheet Documentation  Taken 12/7/2022 0400 by Kia Cardenas RN  Medication Review/Management: medications reviewed  Taken 12/7/2022 0200 by Kia Cardenas RN  Medication Review/Management: medications reviewed  Taken 12/6/2022 2200 by Kia Cardenas RN  Medication Review/Management: medications reviewed  Taken 12/6/2022  1948 by Kia Cardenas RN  Medication Review/Management: medications reviewed     Problem: Obstructive Sleep Apnea Risk or Actual Comorbidity Management  Goal: Unobstructed Breathing During Sleep  Outcome: Ongoing, Progressing     Problem: Osteoarthritis Comorbidity  Goal: Maintenance of Osteoarthritis Symptom Control  Outcome: Ongoing, Progressing  Intervention: Maintain Osteoarthritis Symptom Control  Recent Flowsheet Documentation  Taken 12/7/2022 0400 by Kia Cardenas RN  Medication Review/Management: medications reviewed  Taken 12/7/2022 0200 by Kia Cardenas RN  Medication Review/Management: medications reviewed  Taken 12/6/2022 2200 by Kia Cardenas RN  Medication Review/Management: medications reviewed  Taken 12/6/2022 1948 by Kia Cardenas RN  Medication Review/Management: medications reviewed     Problem: Pain Chronic (Persistent) (Comorbidity Management)  Goal: Acceptable Pain Control and Functional Ability  Outcome: Ongoing, Progressing  Intervention: Manage Persistent Pain  Recent Flowsheet Documentation  Taken 12/7/2022 0400 by Kia Cardenas RN  Medication Review/Management: medications reviewed  Taken 12/7/2022 0200 by Kia Cardenas RN  Medication Review/Management: medications reviewed  Taken 12/6/2022 2200 by Kia Cardenas RN  Medication Review/Management: medications reviewed  Taken 12/6/2022 1948 by Kia Cardenas RN  Medication Review/Management: medications reviewed  Intervention: Optimize Psychosocial Wellbeing  Recent Flowsheet Documentation  Taken 12/7/2022 0400 by Kia Cardenas RN  Diversional Activities: (pt sleeping) other (see comments)  Taken 12/6/2022 2353 by Kia Cardenas RN  Diversional Activities: (pt sleeping) other (see comments)  Taken 12/6/2022 1948 by Kia Cardenas RN  Diversional Activities: television     Problem: Seizure Disorder Comorbidity  Goal: Maintenance of Seizure Control  Outcome: Ongoing, Progressing     Problem: Skin Injury Risk  Increased  Goal: Skin Health and Integrity  Outcome: Ongoing, Progressing     Problem: Fall Injury Risk  Goal: Absence of Fall and Fall-Related Injury  Outcome: Ongoing, Progressing  Intervention: Identify and Manage Contributors  Recent Flowsheet Documentation  Taken 12/7/2022 0400 by Kia Cardenas RN  Medication Review/Management: medications reviewed  Taken 12/7/2022 0200 by iKa Cardenas RN  Medication Review/Management: medications reviewed  Taken 12/6/2022 2200 by Kia Cardenas RN  Medication Review/Management: medications reviewed  Taken 12/6/2022 1948 by Kia Cardenas RN  Medication Review/Management: medications reviewed  Intervention: Promote Injury-Free Environment  Recent Flowsheet Documentation  Taken 12/7/2022 0400 by Kia Cardenas RN  Safety Promotion/Fall Prevention:   safety round/check completed   room organization consistent   nonskid shoes/slippers when out of bed   lighting adjusted   fall prevention program maintained   clutter free environment maintained   assistive device/personal items within reach  Taken 12/7/2022 0200 by Kia Cardenas RN  Safety Promotion/Fall Prevention:   safety round/check completed   room organization consistent   nonskid shoes/slippers when out of bed   lighting adjusted   fall prevention program maintained   clutter free environment maintained   assistive device/personal items within reach  Taken 12/6/2022 2200 by Kia Cardenas RN  Safety Promotion/Fall Prevention:   safety round/check completed   room organization consistent   nonskid shoes/slippers when out of bed   lighting adjusted   fall prevention program maintained   clutter free environment maintained   assistive device/personal items within reach  Taken 12/6/2022 1948 by Kia Cardenas RN  Safety Promotion/Fall Prevention:   safety round/check completed   room organization consistent   nonskid shoes/slippers when out of bed   lighting adjusted   fall prevention program maintained    clutter free environment maintained   assistive device/personal items within reach     Problem: Aspiration (Enteral Nutrition)  Goal: Absence of Aspiration Signs and Symptoms  Outcome: Ongoing, Progressing     Problem: Device-Related Complication Risk (Enteral Nutrition)  Goal: Safe, Effective Therapy Delivery  Outcome: Ongoing, Progressing     Problem: Feeding Intolerance (Enteral Nutrition)  Goal: Feeding Tolerance  Outcome: Ongoing, Progressing     Problem: Restraint, Nonbehavioral (Nonviolent)  Goal: Absence of Harm or Injury  Outcome: Ongoing, Progressing  Intervention: Implement Least Restrictive Safety Strategies  Recent Flowsheet Documentation  Taken 12/7/2022 0400 by Kia Cardenas RN  Medical Device Protection:   torso covered   tubing secured   IV pole/bag removed from visual field  Diversional Activities: (pt sleeping) other (see comments)  Taken 12/7/2022 0200 by Kia Cardenas RN  Medical Device Protection:   IV pole/bag removed from visual field   torso covered   tubing secured  Taken 12/6/2022 2353 by Kia Cardenas RN  Diversional Activities: (pt sleeping) other (see comments)  Taken 12/6/2022 2200 by Kia Cardenas RN  Medical Device Protection:   IV pole/bag removed from visual field   torso covered   tubing secured  Taken 12/6/2022 1948 by Kia Cardenas RN  Medical Device Protection:   tubing secured   torso covered   IV pole/bag removed from visual field  Diversional Activities: television  Intervention: Protect Dignity, Rights, and Personal Wellbeing  Recent Flowsheet Documentation  Taken 12/7/2022 0400 by Kia Cardenas RN  Trust Relationship/Rapport: (pt sleeping) other (see comments)  Taken 12/6/2022 2353 by Kia Cardenas RN  Trust Relationship/Rapport: (pt sleeping) other (see comments)  Taken 12/6/2022 1948 by Kia Cardenas RN  Trust Relationship/Rapport:   care explained   emotional support provided   reassurance provided   Goal Outcome Evaluation:         This nurse  received new orders to help with the patients restlessness.

## 2022-12-07 NOTE — THERAPY RE-EVALUATION
Patient Name: Cheyenne Estrella  : 1950    MRN: [id number]                              Today's Date: 2022       Admit Date: 2022    Visit Dx:     ICD-10-CM ICD-9-CM   1. Cerebrovascular accident (CVA), unspecified mechanism (HCC)  I63.9 434.91   2. Feeding difficulty  R63.30 783.3     Patient Active Problem List   Diagnosis   • Aphasia   • Obesity (BMI 30-39.9)   • Oral thrush   • Tobacco abuse   • Encephalopathy   • CVA (cerebral vascular accident) (HCC)   • Cerebrovascular accident (CVA), unspecified mechanism (HCC)   • Carotid stenosis, bilateral   • Essential hypertension   • Feeding difficulty   • Contrast-induced nephropathy   • Acute hypoxemic respiratory failure (HCC)   • CAP (community acquired pneumonia)     Past Medical History:   Diagnosis Date   • CAP (community acquired pneumonia) 2022   • Contrast-induced nephropathy 2022   • Depression    • Hypertension    • Tobacco abuse      Past Surgical History:   Procedure Laterality Date   • KNEE SURGERY        General Information     [facility] Name 22 [facility]          Physical Therapy Time and Intention    Document Type re-evaluation  -     Mode of Treatment physical therapy  -[facility] Name 22 [facility]          General Information    Patient Profile Reviewed yes  -     Prior Level of Function independent:;ADL's;all household mobility  -     Existing Precautions/Restrictions fall;NPO  -     Barriers to Rehab medically complex;cognitive status  -[facility] Name 22 1315          Living Environment    People in Home alone  -[facility] Name 22 [facility]          Home Main Entrance    Number of Stairs, Main Entrance two  -[facility] Name 22 1315          Stairs Within Home, Primary    Number of Stairs, Within Home, Primary none  -[facility] Name 22 1315          Cognition    Orientation Status (Cognition) unable/difficult to assess  pt with expressive aphasia, restless in bed, RUE restraint, does follow  command to squeeze R hand and wiggle R toes  -     Row Name 12/07/22 1315          Safety Issues, Functional Mobility    Impairments Affecting Function (Mobility) balance;cognition;endurance/activity tolerance;grasp;motor control;motor planning;muscle tone abnormal;strength;sensation/sensory awareness;postural/trunk control  -           User Key  (r) = Recorded By, (t) = Taken By, (c) = Cosigned By    Initials Name Provider Type     Barbra Arnold, YORDY Physical Therapist               Mobility     Row Name 12/07/22 1316          Bed Mobility    Bed Mobility bed mobility (all) activities  -     All Activities, [place] (Bed Mobility) maximum assist (25% patient effort)  -     Comment, (Bed Mobility) rolling in bed and repositioning with use of draw sheet, not appropriate to attempt sitting EOB  -[facility] Name 12/07/22 1316          Bed-Chair Transfer    Bed-Chair [place] (Transfers) not tested  -[facility] Name 12/07/22 1316          Sit-Stand Transfer    Sit-Stand [place] (Transfers) not tested  -[facility] Name 12/07/22 1316          Gait/Stairs (Locomotion)    [place] Level (Gait) not tested  -           User Key  (r) = Recorded By, (t) = Taken By, (c) = Cosigned By    Initials Name Provider Type     Barbra Arnold PT Physical Therapist               Obj/Interventions     [facility] Name 12/07/22 1317          Range of Motion Comprehensive    Comment, General Range of Motion RUE AROM WFLs, pt moving volitionally against gravity when unrestrained.  noted AROM RLE when pt restless and agitated in bed. PROM LLE WFLs, noted tone in LUE with PROM  -[facility] Name 12/07/22 1317          Strength Comprehensive (MMT)    Comment, General Manual Muscle Testing (MMT) Assessment LUE/LE 0/5, R side gross 3/5 per observation.  -           User Key  (r) = Recorded By, (t) = Taken By, (c) = Cosigned By    Initials Name Provider Type    Barbra Donahue PT Physical Therapist               Goals/Plan      [facility] Name 12/07/22 1322          Bed Mobility Goal 1 (PT)    Activity/Assistive Device (Bed Mobility Goal 1, PT) bed mobility activities, all  -     [place] Level/Cues Needed (Bed Mobility Goal 1, PT) moderate assist (50-74% patient effort)  -     Time Frame (Bed Mobility Goal 1, PT) 2 weeks  -     Progress/Outcomes (Bed Mobility Goal 1, PT) goal revised this date  -[facility] Name 12/07/22 1322          Transfer Goal 1 (PT)    Activity/Assistive Device (Transfer Goal 1, PT) sit-to-stand/stand-to-sit;bed-to-chair/chair-to-bed  -     [place] Level/Cues Needed (Transfer Goal 1, PT) moderate assist (50-74% patient effort)  -     Time Frame (Transfer Goal 1, PT) 2 weeks  -     Progress/Outcome (Transfer Goal 1, PT) goal revised this date  -[facility] Name 12/07/22 1322          Gait Training Goal 1 (PT)    Progress/Outcome (Gait Training Goal 1, PT) goal no longer appropriate  -[facility] Name 12/07/22 1322          Stairs Goal 1 (PT)    Progress/Outcome (Stairs Goal 1, PT) goal no longer appropriate  -[facility] Name 12/07/22 1322          Therapy Assessment/Plan (PT)    Planned Therapy Interventions (PT) balance training;bed mobility training;transfer training;strengthening;neuromuscular re-education;ROM (range of motion);patient/family education;postural re-education  -           User Key  (r) = Recorded By, (t) = Taken By, (c) = Cosigned By    Initials Name Provider Type     Barbra Arnold, PT Physical Therapist               Clinical Impression     [facility] Name 12/07/22 1319          Pain    Additional Documentation Pain Scale: FACES Pre/Post-Treatment (Group)  -[facility] Name 12/07/22 1319          Pain Scale: FACES Pre/Post-Treatment    Pain: FACES Scale, Pretreatment 0-->no hurt  -     Posttreatment Pain Rating 2-->hurts little bit  -     Pain Location generalized  -[facility] Name 12/07/22 1319          Plan of Care Review    Plan of Care Reviewed With patient;son  -     Outcome  Evaluation Completed re-evaluation this date due to change in status with functional and neurological decline since initial eval.  Pt is aphasic, has RUE in soft restraint and is restless in bed.  Pt only following inconsistent commands to squeeze R hand and move R foot.  No active movement observed in LUE/LE and pt with L side neglect and eye gaze preference to R with limited cervical rotation to L side.  Pt is dependent for bed mobility at this time and unable to assess sitting balance at EOB.  Pts family is deciding on goals of care, aware of significant functional decline.  Will follow 3x/week and recommend SNF for rehab.  -     Row Name 12/07/22 1319          Therapy Assessment/Plan (PT)    Rehab Potential (PT) fair, will monitor progress closely  -     Criteria for Skilled Interventions Met (PT) yes;skilled treatment is necessary  -     Therapy Frequency (PT) 3 times/wk  -     Row Name 12/07/22 1319          Vital Signs    O2 Delivery Pre Treatment hi-flow  -     O2 Delivery Intra Treatment hi-flow  -     O2 Delivery Post Treatment hi-flow  -     Row Name 12/07/22 1319          Positioning and Restraints    Pre-Treatment Position in bed  -     Post Treatment Position bed  -JH     In Bed notified nsg;call light within reach;with family/caregiver;side lying left  -           User Key  (r) = Recorded By, (t) = Taken By, (c) = Cosigned By    Initials Name Provider Type    Barbra Donahue, PT Physical Therapist               Outcome Measures     Row Name 12/07/22 1323 12/07/22 0800       How much help from another person do you currently need...    Turning from your back to your side while in flat bed without using bedrails? 1  - 1  -AT    Moving from lying on back to sitting on the side of a flat bed without bedrails? 1  - 1  -AT    Moving to and from a bed to a chair (including a wheelchair)? 1  - 1  -AT    Standing up from a chair using your arms (e.g., wheelchair, bedside chair)? 1  -  1  -AT    Climbing 3-5 steps with a railing? 1  -JH 1  -AT    To walk in hospital room? 1  -JH 1  -AT    AM-PAC 6 Clicks Score (PT) 6  -JH 6  -AT    Highest level of mobility 2 --> Bed activities/dependent transfer  -JH 2 --> Bed activities/dependent transfer  -AT    Row Name 12/07/22 1323 12/07/22 1138       Modified Nahed Scale    Pre-Stroke Modified [place] Scale -- 1 - No significant disability despite symptoms.  Able to carry out all usual duties and activities.  -SB    Modified [place] Scale 5 - Severe disability.  Bedridden, incontinent, and requiring constant nursing care and attention.  -JH 5 - Severe disability.  Bedridden, incontinent, and requiring constant nursing care and attention.  -SB    Row Name 12/07/22 1323 12/07/22 1138       Functional Assessment    Outcome Measure Options AM-PAC 6 Clicks Basic Mobility (PT);Modified Nahed  - AM-PAC 6 Clicks Daily Activity (OT);Modified [place]  -SB          User Key  (r) = Recorded By, (t) = Taken By, (c) = Cosigned By    Initials Name Provider Type    Barbra Donahue, PT Physical Therapist    AT Shira Ro LPN Licensed Nurse    Dawn Valiente, OT Occupational Therapist                             Physical Therapy Education     Title: PT OT SLP Therapies (Done)     Topic: Physical Therapy (Done)     Point: Mobility training (Done)     Learning Progress Summary           Patient Acceptance, E, VU by AT at 12/7/2022 1034    Eager, E, VU by HR at 12/6/2022 1810    Acceptance, E,TB, VU,DU by  at 12/4/2022 1800    Acceptance, E,TB, VU,DU by JS at 11/30/2022 1751    Acceptance, E,TB, VU,DU by JS at 11/27/2022 1825    Acceptance, E, DU by AS at 11/26/2022 0906    Acceptance, E,TB, DU by EL at 11/25/2022 1320   Family Acceptance, E, VU by AT at 12/7/2022 1034    Acceptance, E,TB, VU,DU by JS at 12/4/2022 1800    Acceptance, E,TB, VU,DU by JS at 11/30/2022 1751    Acceptance, E,TB, VU,DU by JS at 11/27/2022 1825                   Point: Precautions (Done)      Learning Progress Summary           Patient Acceptance, E, VU by AT at 12/7/2022 1034    Eager, E, VU by HR at 12/6/2022 1810    Acceptance, E,TB, VU,DU by JS at 12/4/2022 1800    Acceptance, E,TB, VU,DU by JS at 11/30/2022 1751    Acceptance, E,TB, VU,DU by JS at 11/27/2022 1825    Acceptance, E, DU by AS at 11/26/2022 0906    Acceptance, E,TB, DU by EL at 11/25/2022 1320   Family Acceptance, E, VU by AT at 12/7/2022 1034    Acceptance, E,TB, VU,DU by JS at 12/4/2022 1800    Acceptance, E,TB, VU,DU by JS at 11/30/2022 1751    Acceptance, E,TB, VU,DU by JS at 11/27/2022 1825                               User Key     Initials Effective Dates Name Provider Type Discipline    EL 06/23/20 -  Donavan Sams, PT Physical Therapist PT    AS 09/29/21 -  Alicia Ceballos, RN Registered Nurse Nurse    HR 08/12/22 -  Radha Bolivar, RN Registered Nurse Nurse    JS 05/24/22 -  Verito Giron LPN Licensed Nurse Nurse    AT 05/24/22 -  Shira Ro LPN Licensed Nurse Nurse              PT Recommendation and Plan  Planned Therapy Interventions (PT): balance training, bed mobility training, transfer training, strengthening, neuromuscular re-education, ROM (range of motion), patient/family education, postural re-education  Plan of Care Reviewed With: patient, son  Outcome Evaluation: Completed re-evaluation this date due to change in status with functional and neurological decline since initial eval.  Pt is aphasic, has RUE in soft restraint and is restless in bed.  Pt only following inconsistent commands to squeeze R hand and move R foot.  No active movement observed in LUE/LE and pt with L side neglect and eye gaze preference to R with limited cervical rotation to L side.  Pt is dependent for bed mobility at this time and unable to assess sitting balance at EOB.  Pts family is deciding on goals of care, aware of significant functional decline.  Will follow 3x/week and recommend SNF for rehab.     Time Calculation:    PT Charges      Row Name 12/07/22 1324             Time Calculation    Start Time 0955  -      Stop Time 1014  -      Time Calculation (min) 19 min  -      PT Received On 12/07/22  -      PT - Next Appointment 12/09/22  -      PT Goal Re-Cert Due Date 12/21/22  -         Time Calculation- PT    Total Timed Code Minutes- PT 8 minute(s)  -            User Key  (r) = Recorded By, (t) = Taken By, (c) = Cosigned By    Initials Name Provider Type     Barbra Arnold PT Physical Therapist              Therapy Charges for Today     Code Description Service Date Service Provider Modifiers Qty    [id number]  PT NEUROMUSC RE EDUCATION EA 15 MIN 12/7/2022 Barbra Arnold, PT GP 1    [id number]  PT RE-EVAL ESTABLISHED PLAN 2 12/7/2022 Barbra Arnold, PT GP 1          PT G-Codes  Outcome Measure Options: AM-PAC 6 Clicks Basic Mobility (PT), Modified [place]  AM-PAC 6 Clicks Score (PT): 6  AM-PAC 6 Clicks Score (OT): 7  Modified [place] Scale: 5 - Severe disability.  Bedridden, incontinent, and requiring constant nursing care and attention.  PT Discharge Summary  Anticipated Discharge Disposition (PT): skilled nursing facility    Barbra Arnold PT  12/7/2022

## 2022-12-07 NOTE — PLAN OF CARE
Goal Outcome Evaluation:  Plan of Care Reviewed With: patient, son           Outcome Evaluation: Completed re-evaluation this date due to change in status with functional and neurological decline since initial eval.  Pt is aphasic, has RUE in soft restraint and is restless in bed.  Pt only following inconsistent commands to squeeze R hand and move R foot.  No active movement observed in LUE/LE and pt with L side neglect and eye gaze preference to R with limited cervical rotation to L side.  Pt is dependent for bed mobility at this time and unable to assess sitting balance at EOB.  Pts family is deciding on goals of care, aware of significant functional decline.  Will follow 3x/week and recommend SNF for rehab.

## 2022-12-07 NOTE — NURSING NOTE
This nurse has observed the patient kicking her legs and flailing her arms. The patient appears to be very anxious. This nurse called the provider on call for Dr. Scott related to the patients restlessness. This nurse is currently awaiting a call. Will continue to update as needed.

## 2022-12-07 NOTE — CASE MANAGEMENT/SOCIAL WORK
Continued Stay Note  DIANNE Bryant     Patient Name: Cheyenne Estrella  MRN: [id number]  Today's Date: 12/7/2022    Admit Date: 11/24/2022    Plan: Pending Hospice Agency.  List given to son   Discharge Plan     Row Name 12/07/22 1113       Plan    Plan Comments Barriers to d/c: Medical decline; NG feeding tube; Hospice pending.             Expected Discharge Date and Time     Expected Discharge Date Expected Discharge Time    Dec 9, 2022             Gia Chavez RN

## 2022-12-08 LAB
BACTERIA SPEC AEROBE CULT: NORMAL
BACTERIA SPEC AEROBE CULT: NORMAL
DEPRECATED RDW RBC AUTO: 50.8 FL (ref 37–54)
ERYTHROCYTE [DISTWIDTH] IN BLOOD BY AUTOMATED COUNT: 15.4 % (ref 12.3–15.4)
GLUCOSE BLDC GLUCOMTR-MCNC: 154 MG/DL (ref 70–105)
GLUCOSE BLDC GLUCOMTR-MCNC: 165 MG/DL (ref 70–105)
GLUCOSE BLDC GLUCOMTR-MCNC: 165 MG/DL (ref 70–105)
HCT VFR BLD AUTO: 27.1 % (ref 34–46.6)
HGB BLD-MCNC: 8.9 G/DL (ref 12–15.9)
MCH RBC QN AUTO: 29.4 PG (ref 26.6–33)
MCHC RBC AUTO-ENTMCNC: 32.8 G/DL (ref 31.5–35.7)
MCV RBC AUTO: 89.5 FL (ref 79–97)
PLATELET # BLD AUTO: 315 10*3/MM3 (ref 140–450)
PMV BLD AUTO: 9.5 FL (ref 6–12)
RBC # BLD AUTO: 3.03 10*6/MM3 (ref 3.77–5.28)
WBC NRBC COR # BLD: 28.3 10*3/MM3 (ref 3.4–10.8)

## 2022-12-08 PROCEDURE — 94799 UNLISTED PULMONARY SVC/PX: CPT

## 2022-12-08 PROCEDURE — 85027 COMPLETE CBC AUTOMATED: CPT | Performed by: STUDENT IN AN ORGANIZED HEALTH CARE EDUCATION/TRAINING PROGRAM

## 2022-12-08 PROCEDURE — 99231 SBSQ HOSP IP/OBS SF/LOW 25: CPT | Performed by: SURGERY

## 2022-12-08 PROCEDURE — 25010000002 CEFEPIME PER 500 MG: Performed by: INTERNAL MEDICINE

## 2022-12-08 PROCEDURE — 63710000001 INSULIN LISPRO (HUMAN) PER 5 UNITS: Performed by: NURSE PRACTITIONER

## 2022-12-08 PROCEDURE — 82962 GLUCOSE BLOOD TEST: CPT

## 2022-12-08 PROCEDURE — 36415 COLL VENOUS BLD VENIPUNCTURE: CPT | Performed by: STUDENT IN AN ORGANIZED HEALTH CARE EDUCATION/TRAINING PROGRAM

## 2022-12-08 PROCEDURE — 94664 DEMO&/EVAL PT USE INHALER: CPT

## 2022-12-08 PROCEDURE — 94760 N-INVAS EAR/PLS OXIMETRY 1: CPT

## 2022-12-08 RX ORDER — ACETAMINOPHEN 650 MG/1
325 SUPPOSITORY RECTAL EVERY 4 HOURS PRN
Status: DISCONTINUED | OUTPATIENT
Start: 2022-12-08 | End: 2022-12-09 | Stop reason: HOSPADM

## 2022-12-08 RX ORDER — IPRATROPIUM BROMIDE AND ALBUTEROL SULFATE 2.5; .5 MG/3ML; MG/3ML
3 SOLUTION RESPIRATORY (INHALATION) EVERY 6 HOURS PRN
Status: DISCONTINUED | OUTPATIENT
Start: 2022-12-08 | End: 2022-12-09 | Stop reason: HOSPADM

## 2022-12-08 RX ADMIN — INSULIN LISPRO 2 UNITS: 100 INJECTION, SOLUTION INTRAVENOUS; SUBCUTANEOUS at 11:49

## 2022-12-08 RX ADMIN — Medication 10 ML: at 08:38

## 2022-12-08 RX ADMIN — INSULIN LISPRO 2 UNITS: 100 INJECTION, SOLUTION INTRAVENOUS; SUBCUTANEOUS at 17:06

## 2022-12-08 RX ADMIN — CEFEPIME 2 G: 2 INJECTION, POWDER, FOR SOLUTION INTRAVENOUS at 17:06

## 2022-12-08 RX ADMIN — ATORVASTATIN CALCIUM 80 MG: 40 TABLET, FILM COATED ORAL at 20:29

## 2022-12-08 RX ADMIN — LORAZEPAM 0.5 MG: 0.5 TABLET ORAL at 12:01

## 2022-12-08 RX ADMIN — Medication 10 ML: at 20:29

## 2022-12-08 RX ADMIN — IPRATROPIUM BROMIDE AND ALBUTEROL SULFATE 3 ML: 2.5; .5 SOLUTION RESPIRATORY (INHALATION) at 07:18

## 2022-12-08 RX ADMIN — LIDOCAINE 1 PATCH: 50 PATCH CUTANEOUS at 08:36

## 2022-12-08 RX ADMIN — IPRATROPIUM BROMIDE AND ALBUTEROL SULFATE 3 ML: 2.5; .5 SOLUTION RESPIRATORY (INHALATION) at 11:10

## 2022-12-08 RX ADMIN — IPRATROPIUM BROMIDE AND ALBUTEROL SULFATE 3 ML: 2.5; .5 SOLUTION RESPIRATORY (INHALATION) at 18:11

## 2022-12-08 RX ADMIN — SERTRALINE 50 MG: 50 TABLET, FILM COATED ORAL at 08:36

## 2022-12-08 NOTE — CASE MANAGEMENT/SOCIAL WORK
Continued Stay Note   Manny     Patient Name: Cheyenne Estrella  MRN: [id number]  Today's Date: 12/8/2022    Admit Date: 11/24/2022    Plan: [facility] pending acceptance; Will need ambulance if they choose home.   Discharge Plan     Row Name 12/08/22 1451       Plan    Plan [facility] pending acceptance; Will need ambulance if they choose home.    Plan Comments Barriers to d/c: Fever, medical decline.  ([facility] to see)           Expected Discharge Date and Time     Expected Discharge Date Expected Discharge Time    Dec 9, 2022             Gia Chavez RN

## 2022-12-08 NOTE — PLAN OF CARE
Problem: Adult Inpatient Plan of Care  Goal: Absence of Hospital-Acquired Illness or Injury  Intervention: Identify and Manage Fall Risk  Recent Flowsheet Documentation  Taken 12/8/2022 0410 by Taylor Ricks RN  Safety Promotion/Fall Prevention:   fall prevention program maintained   clutter free environment maintained   assistive device/personal items within reach   nonskid shoes/slippers when out of bed   safety round/check completed   room organization consistent  Taken 12/8/2022 0211 by Taylor Ricks RN  Safety Promotion/Fall Prevention:   safety round/check completed   clutter free environment maintained   assistive device/personal items within reach   fall prevention program maintained   lighting adjusted   nonskid shoes/slippers when out of bed   room organization consistent  Intervention: Prevent Skin Injury  Recent Flowsheet Documentation  Taken 12/8/2022 0410 by Taylor Ricks RN  Body Position:   turned   left   side-lying  Skin Protection: adhesive use limited  Intervention: Prevent and Manage VTE (Venous Thromboembolism) Risk  Recent Flowsheet Documentation  Taken 12/8/2022 0410 by Taylor Ricks RN  Activity Management: activity adjusted per tolerance  VTE Prevention/Management:   bilateral   sequential compression devices on  Range of Motion: active ROM (range of motion) encouraged  Intervention: Prevent Infection  Recent Flowsheet Documentation  Taken 12/8/2022 0410 by Taylor Ricks RN  Infection Prevention:   rest/sleep promoted   hand hygiene promoted   equipment surfaces disinfected   single patient room provided  Taken 12/8/2022 0211 by Taylor Ricks RN  Infection Prevention:   single patient room provided   rest/sleep promoted   hand hygiene promoted   equipment surfaces disinfected  Goal: Optimal Comfort and Wellbeing  Intervention: Provide Person-Centered Care  Recent Flowsheet Documentation  Taken 12/8/2022 0410 by Taylor Ricks RN  Trust Relationship/Rapport:    thoughts/feelings acknowledged   choices provided   care explained   questions answered   empathic listening provided     Problem: Adjustment to Illness (Stroke, Ischemic/Transient Ischemic Attack)  Goal: Optimal Coping  Intervention: Support Psychosocial Response to Stroke  Recent Flowsheet Documentation  Taken 12/8/2022 0410 by Taylor Ricks RN  Supportive Measures:   goal-setting facilitated   decision-making supported   verbalization of feelings encouraged   problem-solving facilitated   relaxation techniques promoted   active listening utilized  Family/Support System Care: support provided     Problem: Cerebral Tissue Perfusion (Stroke, Ischemic/Transient Ischemic Attack)  Goal: Optimal Cerebral Tissue Perfusion  Intervention: Protect and Optimize Cerebral Perfusion  Recent Flowsheet Documentation  Taken 12/8/2022 0410 by Taylor Ricks RN  Stabilization Measures: legs elevated  Sensory Stimulation Regulation:   lighting decreased   care clustered   quiet environment promoted  Cerebral Perfusion Promotion: blood pressure monitored     Problem: Cognitive Impairment (Stroke, Ischemic/Transient Ischemic Attack)  Goal: Optimal Cognitive Function  Intervention: Optimize Cognitive Function  Recent Flowsheet Documentation  Taken 12/8/2022 0410 by Taylor Ricks RN  Sensory Stimulation Regulation:   lighting decreased   care clustered   quiet environment promoted  Reorientation Measures:   clock in view   calendar in view  Environment Familiarity/Consistency: daily routine followed     Problem: Communication Impairment (Stroke, Ischemic/Transient Ischemic Attack)  Goal: Improved Communication Skills  Intervention: Optimize Communication Skills  Recent Flowsheet Documentation  Taken 12/8/2022 0410 by Taylor Ricks RN  Communication Enhancement Strategies:   extra time allowed for response   call light answered in person   communication board used   repetition utilized     Problem: Functional Ability  Impaired (Stroke, Ischemic/Transient Ischemic Attack)  Goal: Optimal Functional Ability  Intervention: Optimize Functional Ability  Recent Flowsheet Documentation  Taken 12/8/2022 0410 by Taylor Ricks RN  Activity Management: activity adjusted per tolerance  Self-Care Promotion:   independence encouraged   BADL personal objects within reach   BADL personal routines maintained     Problem: Respiratory Compromise (Stroke, Ischemic/Transient Ischemic Attack)  Goal: Effective Oxygenation and Ventilation  Intervention: Optimize Oxygenation and Ventilation  Recent Flowsheet Documentation  Taken 12/8/2022 0410 by Taylor Ricks RN  Head of Bed (HOB) Positioning: HOB at 30 degrees     Problem: Sensorimotor Impairment (Stroke, Ischemic/Transient Ischemic Attack)  Goal: Improved Sensorimotor Function  Intervention: Optimize Range of Motion, Motor Control and Function  Recent Flowsheet Documentation  Taken 12/8/2022 0410 by Taylor Ricks RN  Spasticity Management: positioned with supportive device  Positioning/Transfer Devices:   pillows   in use  Range of Motion: active ROM (range of motion) encouraged  Intervention: Optimize Sensory and Perceptual Ability  Recent Flowsheet Documentation  Taken 12/8/2022 0410 by Taylor Ricks RN  Pressure Reduction Techniques: weight shift assistance provided  Pressure Reduction Devices: pressure-redistributing mattress utilized     Problem: Swallowing Impairment (Stroke, Ischemic/Transient Ischemic Attack)  Goal: Optimal Eating and Swallowing without Aspiration  Intervention: Optimize Eating and Swallowing  Recent Flowsheet Documentation  Taken 12/8/2022 0410 by Taylor Ricks RN  Aspiration Precautions:   upright posture maintained   respiratory status monitored     Problem: Urinary Elimination Impaired (Stroke, Ischemic/Transient Ischemic Attack)  Goal: Effective Urinary Elimination  Intervention: Promote Effective Bladder Elimination  Recent Flowsheet  Documentation  Taken 12/8/2022 0410 by Taylor Ricks RN  Urinary Elimination Promotion: absorbent pad/diaper use encouraged     Problem: Asthma Comorbidity  Goal: Maintenance of Asthma Control  Intervention: Maintain Asthma Symptom Control  Recent Flowsheet Documentation  Taken 12/8/2022 0410 by Taylor Ricks RN  Medication Review/Management: medications reviewed  Taken 12/8/2022 0211 by Taylor Ricks RN  Medication Review/Management: medications reviewed     Problem: Behavioral Health Comorbidity  Goal: Maintenance of Behavioral Health Symptom Control  Intervention: Maintain Behavioral Health Symptom Control  Recent Flowsheet Documentation  Taken 12/8/2022 0410 by Taylor Ricks RN  Medication Review/Management: medications reviewed  Taken 12/8/2022 0211 by Taylor Ricks RN  Medication Review/Management: medications reviewed     Problem: COPD (Chronic Obstructive Pulmonary Disease) Comorbidity  Goal: Maintenance of COPD Symptom Control  Intervention: Maintain COPD-Symptom Control  Recent Flowsheet Documentation  Taken 12/8/2022 0410 by Taylor Ricks RN  Supportive Measures:   goal-setting facilitated   decision-making supported   verbalization of feelings encouraged   problem-solving facilitated   relaxation techniques promoted   active listening utilized  Medication Review/Management: medications reviewed  Taken 12/8/2022 0211 by Taylor Ricks RN  Medication Review/Management: medications reviewed     Problem: Diabetes Comorbidity  Goal: Blood Glucose Level Within Targeted Range  Intervention: Monitor and Manage Glycemia  Recent Flowsheet Documentation  Taken 12/8/2022 0410 by Taylor Ricks RN  Glycemic Management: blood glucose monitored     Problem: Heart Failure Comorbidity  Goal: Maintenance of Heart Failure Symptom Control  Intervention: Maintain Heart Failure-Management  Recent Flowsheet Documentation  Taken 12/8/2022 0410 by Taylor Ricks RN  Medication Review/Management:  medications reviewed  Taken 12/8/2022 0211 by Taylor Ricks RN  Medication Review/Management: medications reviewed     Problem: Hypertension Comorbidity  Goal: Blood Pressure in Desired Range  Intervention: Maintain Blood Pressure Management  Recent Flowsheet Documentation  Taken 12/8/2022 0410 by Taylor Ricks RN  Syncope Management: position changed slowly  Medication Review/Management: medications reviewed  Taken 12/8/2022 0211 by Taylor Ricks RN  Medication Review/Management: medications reviewed     Problem: Osteoarthritis Comorbidity  Goal: Maintenance of Osteoarthritis Symptom Control  Intervention: Maintain Osteoarthritis Symptom Control  Recent Flowsheet Documentation  Taken 12/8/2022 0410 by Taylor Ricks RN  Activity Management: activity adjusted per tolerance  Assistive Device Utilized:   walker   gait belt  Medication Review/Management: medications reviewed  Taken 12/8/2022 0211 by Taylor Ricks RN  Medication Review/Management: medications reviewed     Problem: Pain Chronic (Persistent) (Comorbidity Management)  Goal: Acceptable Pain Control and Functional Ability  Intervention: Manage Persistent Pain  Recent Flowsheet Documentation  Taken 12/8/2022 0410 by Taylor Ricks RN  Sleep/Rest Enhancement:   relaxation techniques promoted   regular sleep/rest pattern promoted   consistent schedule promoted   noise level reduced  Medication Review/Management: medications reviewed  Taken 12/8/2022 0211 by Taylor Ricks RN  Medication Review/Management: medications reviewed  Intervention: Optimize Psychosocial Wellbeing  Recent Flowsheet Documentation  Taken 12/8/2022 0410 by Taylor Ricks RN  Supportive Measures:   goal-setting facilitated   decision-making supported   verbalization of feelings encouraged   problem-solving facilitated   relaxation techniques promoted   active listening utilized  Diversional Activities: television  Family/Support System Care: support  provided  Taken 12/8/2022 0400 by Taylor Ricks RN  Diversional Activities: television  Taken 12/8/2022 0200 by Taylor Ricks RN  Diversional Activities: television     Problem: Seizure Disorder Comorbidity  Goal: Maintenance of Seizure Control  Intervention: Maintain Seizure-Symptom Control  Recent Flowsheet Documentation  Taken 12/8/2022 0410 by Taylor Ricks RN  Seizure Precautions:   activity supervised   emergency equipment at bedside     Problem: Skin Injury Risk Increased  Goal: Skin Health and Integrity  Intervention: Promote and Optimize Oral Intake  Recent Flowsheet Documentation  Taken 12/8/2022 0410 by Taylor Ricks RN  Oral Nutrition Promotion:   medicated   rest periods promoted  Intervention: Optimize Skin Protection  Recent Flowsheet Documentation  Taken 12/8/2022 0410 by Taylor Ricks RN  Pressure Reduction Techniques: weight shift assistance provided  Head of Bed (HOB) Positioning: HOB at 30 degrees  Pressure Reduction Devices: pressure-redistributing mattress utilized  Skin Protection: adhesive use limited     Problem: Fall Injury Risk  Goal: Absence of Fall and Fall-Related Injury  Intervention: Identify and Manage Contributors  Recent Flowsheet Documentation  Taken 12/8/2022 0410 by Taylor Ricks RN  Medication Review/Management: medications reviewed  Self-Care Promotion:   independence encouraged   BADL personal objects within reach   BADL personal routines maintained  Taken 12/8/2022 0211 by Taylor Ricks RN  Medication Review/Management: medications reviewed  Intervention: Promote Injury-Free Environment  Recent Flowsheet Documentation  Taken 12/8/2022 0410 by Taylor Ricks RN  Safety Promotion/Fall Prevention:   fall prevention program maintained   clutter free environment maintained   assistive device/personal items within reach   nonskid shoes/slippers when out of bed   safety round/check completed   room organization consistent  Taken 12/8/2022 0211 by  Ricks, Taylor, RN  Safety Promotion/Fall Prevention:   safety round/check completed   clutter free environment maintained   assistive device/personal items within reach   fall prevention program maintained   lighting adjusted   nonskid shoes/slippers when out of bed   room organization consistent     Problem: Aspiration (Enteral Nutrition)  Goal: Absence of Aspiration Signs and Symptoms  Intervention: Minimize Aspiration Risk  Recent Flowsheet Documentation  Taken 12/8/2022 0410 by Taylor Ricks RN  Head of Bed (HOB) Positioning: HOB at 30 degrees     Problem: Feeding Intolerance (Enteral Nutrition)  Goal: Feeding Tolerance  Intervention: Prevent and Manage Feeding Intolerance  Recent Flowsheet Documentation  Taken 12/8/2022 0410 by Taylor Ricks RN  Nutrition Support Management: weight trending reviewed     Problem: Restraint, Nonbehavioral (Nonviolent)  Goal: Absence of Harm or Injury  Intervention: Implement Least Restrictive Safety Strategies  Recent Flowsheet Documentation  Taken 12/8/2022 0410 by Taylor Ricks RN  Medical Device Protection: torso covered  Diversional Activities: television  Taken 12/8/2022 0400 by Taylor Ricks RN  Medical Device Protection:   torso covered   IV pole/bag removed from visual field  Less Restrictive Alternative:   bed alarm in use   emotional support provided  De-Escalation Techniques:   increased round frequency   stimulation decreased  Diversional Activities: television  Taken 12/8/2022 0211 by Taylor Ricks RN  Medical Device Protection: IV pole/bag removed from visual field  Taken 12/8/2022 0200 by Taylor Ricks RN  Medical Device Protection: IV pole/bag removed from visual field  Less Restrictive Alternative:   bed alarm in use   emotional support provided  De-Escalation Techniques:   increased round frequency   stimulation decreased  Diversional Activities: television  Intervention: Protect Dignity, Rights, and Personal Wellbeing  Recent  Flowsheet Documentation  Taken 12/8/2022 0410 by Taylor Ricks, RN  Trust Relationship/Rapport:   thoughts/feelings acknowledged   choices provided   care explained   questions answered   empathic listening provided  Intervention: Protect Skin and Joint Integrity  Recent Flowsheet Documentation  Taken 12/8/2022 0410 by Taylor Ricks, RN  Body Position:   turned   left   side-lying  Range of Motion: active ROM (range of motion) encouraged   Goal Outcome Evaluation:

## 2022-12-08 NOTE — PLAN OF CARE
Goal Outcome Evaluation:Family at bedside. Temp 100.8 axillary and rectal.No Tylenol orders in MAR. Hospitalist notified.Jevity 1.5 running at 55ml/hr. Head of bed at 45 degrees. Will continue to monitor.

## 2022-12-08 NOTE — PROGRESS NOTES
GENERAL SURGERY PROGRESS NOTE  Chief Complaint:  Surgery Follow up   LOS: 14 days       Subjective     Interval History:     Resting comfortably, tolerating TF    Objective     Vital Signs  Temp:  [97.7 °F (36.5 °C)-100.8 °F (38.2 °C)] 100.8 °F (38.2 °C)  Heart Rate:  [] 103  Resp:  [20-35] 34  BP: (114-154)/(50-60) 147/50    Physical Exam:   Abdomen soft  Labs:  Lab Results (last 24 hours)     Procedure Component Value Units Date/Time    POC Glucose Once [[id number]]  (Abnormal) Collected: 12/08/22 1116    Specimen: Blood Updated: 12/08/22 1117     Glucose 154 mg/dL      Comment: Serial Number: [id number]:  [id number]       POC Glucose Once [[id number]]  (Abnormal) Collected: 12/08/22 0641    Specimen: Blood Updated: 12/08/22 0642     Glucose 165 mg/dL      Comment: Serial Number: [id number]:  [id number]       CBC (No Diff) [[id number]]  (Abnormal) Collected: 12/08/22 0403    Specimen: Blood Updated: 12/08/22 0418     WBC 28.30 10*3/mm3      RBC 3.03 10*6/mm3      Hemoglobin 8.9 g/dL      Hematocrit 27.1 %      MCV 89.5 fL      MCH 29.4 pg      MCHC 32.8 g/dL      RDW 15.4 %      RDW-SD 50.8 fl      MPV 9.5 fL      Platelets 315 10*3/mm3     Blood Culture - Blood, Hand, Left [[id number]]  (Normal) Collected: 12/03/22 0328    Specimen: Blood from Hand, Left Updated: 12/08/22 0400     Blood Culture No growth at 5 days    Narrative:      Less than seven (7) mL's of blood was collected.  Insufficient quantity may yield false negative results.    Blood Culture - Blood, Hand, Right [[id number]]  (Normal) Collected: 12/03/22 0335    Specimen: Blood from Hand, Right Updated: 12/08/22 0400     Blood Culture No growth at 5 days    Narrative:      Less than seven (7) mL's of blood was collected.  Insufficient quantity may yield false negative results.    POC Glucose Once [[id number]]  (Abnormal) Collected: 12/07/22 2338    Specimen: Blood Updated: 12/07/22 2339     Glucose 145 mg/dL      Comment: Serial Number:  [id number]:  [id number]       POC Glucose Once [[id number]]  (Abnormal) Collected: 12/07/22 1649    Specimen: Blood Updated: 12/07/22 1650     Glucose 157 mg/dL      Comment: Serial Number: [id number]:  [id number]              Results Review:     Labs and imaging for today were reviewed.    Assessment & Plan     Cheyenne Estrella is a 72 y.o. female who has rectus and pelvic hematoma.      Hgb remains stable.  No surgical intervention planned.  Will sign off, call if needed.          This document has been electronically signed by Cruz Kumar MD on December 8, 2022 16:41 EST        Cruz Kumar MD  12/08/22  16:41 EST

## 2022-12-08 NOTE — PROGRESS NOTES
Daily Progress Note        Cerebrovascular accident (CVA), unspecified mechanism (HCC)    Aphasia    Obesity (BMI 30-39.9)    Tobacco abuse    Carotid stenosis, bilateral    Essential hypertension    Feeding difficulty    Contrast-induced nephropathy    Acute hypoxemic respiratory failure (HCC)    CAP (community acquired pneumonia)      Assessment    Left lower lobe pneumonia questionable aspiration   cerebrovascular accident (CVA), unspecified mechanism (HCC)  Hypoxia  Bibasilar opacities: Atelectasis versus early pneumonia  Aphasia  KVNG  Spontaneous retrorectal hematoma  Acute anemia due to blood loss in the hematoma  History of tobacco smoking  Bilateral carotid stenosis  Essential hypertension      Recommendations:    Family considering hospice care    oxygen supplement and titration to maintain saturation 90 to 95%  -currently requiring 7 L high flow    Antibiotics: Completing cefepime started 12/3/2022 and given 1 dose of vancomycin  monitor Neuro status and adjust antibiotic as needed    Aspiration precautions, elevate head of bed  -Tube feeds, surgery following    Bronchodilator  Blood pressure support: Midodrine  Atorvastatin  DVT/GI prophylaxis  Check daily labs and correct electrolytes as needed    Monitor hemoglobin transfuse as needed for hemoglobin less than 7           LOS: 14 days     Subjective         Objective     Vital signs for last 24 hours:  Vitals:    12/07/22 1838 12/07/22 2128 12/08/22 0211 12/08/22 0519   BP:  153/58 146/56 114/60   BP Location:  Left arm Left arm Left arm   Patient Position:  Lying Lying Lying   Pulse: 89 103 95 98   Resp: 20 23 (!) 29 (!) 33   Temp:   98.9 °F (37.2 °C) 98.6 °F (37 °C)   TempSrc:   Axillary    SpO2: 99% 93% 96% 95%   Weight:       Height:           Intake/Output last 3 shifts:  I/O last 3 completed shifts:  In: 240 [P.O.:240]  Out: 3500 [Urine:3500]  Intake/Output this shift:  No intake/output data recorded.      Radiology  Imaging Results (Last 24 Hours)      ** No results found for the last 24 hours. **          Labs:  Results from last 7 days   Lab Units 12/08/22  0403   WBC 10*3/mm3 28.30*   HEMOGLOBIN g/dL 8.9*   HEMATOCRIT % 27.1*   PLATELETS 10*3/mm3 315     Results from last 7 days   Lab Units 12/07/22  1426   SODIUM mmol/L 151*   POTASSIUM mmol/L 3.6   CHLORIDE mmol/L 115*   CO2 mmol/L 27.0   BUN mg/dL 32*   CREATININE mg/dL 0.91   CALCIUM mg/dL 8.9   BILIRUBIN mg/dL 0.6   ALK PHOS U/L 138*   ALT (SGPT) U/L 100*   AST (SGOT) U/L 96*   GLUCOSE mg/dL 153*     Results from last 7 days   Lab Units 12/03/22  1314   PH, ARTERIAL pH units 7.443   PO2 ART mm Hg 63.9*   PCO2, ARTERIAL mm Hg 36.6   HCO3 ART mmol/L 25.0     Results from last 7 days   Lab Units 12/07/22  1426 12/06/22  0339 12/05/22  0110   ALBUMIN g/dL 3.00* 3.10* 2.80*             Results from last 7 days   Lab Units 12/06/22  0339   MAGNESIUM mg/dL 2.6*     Results from last 7 days   Lab Units 12/03/22  0336   INR  1.05   APTT seconds 33.1*               Meds:   SCHEDULE  atorvastatin, 80 mg, Per G Tube, Nightly  bisacodyl, 10 mg, Rectal, Once  cefepime, 2 g, Intravenous, Q24H  insulin lispro, 2-7 Units, Subcutaneous, Q6H  ipratropium-albuterol, 3 mL, Nebulization, Q6H While Awake - RT  lidocaine, 1 patch, Transdermal, Q24H  midodrine, 10 mg, Nasogastric, TID AC  sertraline, 50 mg, Per G Tube, Daily  sodium chloride, 10 mL, Intravenous, Q12H      Infusions     PRNs  •  bisacodyl  •  dextrose  •  dextrose  •  glucagon (human recombinant)  •  hydrALAZINE  •  labetalol  •  LORazepam  •  Morphine  •  ondansetron  •  sodium chloride  •  sodium chloride  •  sodium chloride  •  sodium chloride    Physical Exam:  Physical Exam  Vitals reviewed.   Constitutional:       Appearance: She is ill-appearing.   Pulmonary:      Breath sounds: Rhonchi and rales present.   Skin:     General: Skin is warm and dry.         ROS  Review of Systems   Unable to perform ROS: Mental status change       I have reviewed the  patient's new clinical results.    Electronically signed by Naz Walls MD

## 2022-12-08 NOTE — PROGRESS NOTES
Nutrition Services    Patient Name: Cheyenne Estrella  YOB: 1950  MRN: [id number]  Admission date: 11/24/2022    PPE Documentation        PPE Worn By Provider Did not enter room for this encounter   PPE Worn By Patient  N/A     PROGRESS NOTE      Encounter Information: Progress note to check on TF. Isosource 1.5 running at 55 ml/hr per EMR with minimal residuals.        PO Diet: NPO Diet NPO Type: Strict NPO   PO Supplements: None    PO Intake:  N/a        Current nutrition support: Isosource 1.5 55 ml/hr + 90 ml/hr water flush + Prosource TF QD   Nutrition support review: TF rate increased 12/6 d/t more accurate wt to base needs on       Labs (reviewed below): New labs not available today, messaged LPN requesting CMP to be completed tomorrow to monitor electrolytes        GI Function:  Stool Output  Stool Unmeasured Occurrence: 1 (12/07/22 1021)  Bowel Incontinence: No (12/07/22 1021)  Stool Amount: large (12/07/22 1021)          Nutrition Intervention Updates: Continue current TF at goal      Results from last 7 days   Lab Units 12/07/22  1426 12/06/22  0339 12/05/22  0110   SODIUM mmol/L 151* 153* 149*   POTASSIUM mmol/L 3.6 3.5 3.9   CHLORIDE mmol/L 115* 119* 113*   CO2 mmol/L 27.0 23.0 22.0   BUN mg/dL 32* 56* 85*   CREATININE mg/dL 0.91 1.32* 2.28*   CALCIUM mg/dL 8.9 9.1 8.5*   BILIRUBIN mg/dL 0.6 0.5 0.3   ALK PHOS U/L 138* 125* 111   ALT (SGPT) U/L 100* 96* 49*   AST (SGOT) U/L 96* 109* 30   GLUCOSE mg/dL 153* 153* 130*     Results from last 7 days   Lab Units 12/08/22  0403 12/07/22  0348 12/06/22  0339 12/05/22  1553 12/05/22  0110 12/04/22  1159 12/04/22  0605   MAGNESIUM mg/dL  --   --  2.6*  --  2.6*  --  2.4   PHOSPHORUS mg/dL  --   --  2.6  --  4.9*  --  5.5*   HEMOGLOBIN g/dL 8.9*   < > 9.4*   < > 7.4*   < > 8.7*   HEMATOCRIT % 27.1*   < > 30.0*   < > 22.7*   < > 27.6*    < > = values in this interval not displayed.     COVID19   Date Value Ref Range Status   02/18/2022 Not  Detected Not Detected - Ref. Range Final     Lab Results   Component Value Date    HGBA1C 5.4 11/25/2022       RD to follow up per protocol.    Electronically signed by:  Mayra Godoy RD  12/08/22 14:40 EST

## 2022-12-08 NOTE — PROGRESS NOTES
General Surgery Progress Note    Name: Cheyenne Estrella ADMIT: 2022   : 1950  PCP: Melba Rosen APRN    MRN: [id number] LOS: 14 days   AGE/SEX: 72 y.o. female  ROOM: [address] [place] [address]    Chief Complaint   Patient presents with   • Speech Problem     Subjective     No new issues    Objective     Scheduled Medications:   atorvastatin, 80 mg, Per G Tube, Nightly  bisacodyl, 10 mg, Rectal, Once  cefepime, 2 g, Intravenous, Q24H  insulin lispro, 2-7 Units, Subcutaneous, Q6H  ipratropium-albuterol, 3 mL, Nebulization, Q6H While Awake - RT  lidocaine, 1 patch, Transdermal, Q24H  midodrine, 10 mg, Nasogastric, TID AC  sertraline, 50 mg, Per G Tube, Daily  sodium chloride, 10 mL, Intravenous, Q12H        Active Infusions:       As Needed Medications:  •  bisacodyl  •  dextrose  •  dextrose  •  glucagon (human recombinant)  •  hydrALAZINE  •  labetalol  •  LORazepam  •  Morphine  •  ondansetron  •  sodium chloride  •  sodium chloride  •  sodium chloride  •  sodium chloride    Vital Signs  Vital Signs Patient Vitals for the past 24 hrs:   BP Temp Temp src Pulse Resp SpO2 Weight   22 0519 114/60 98.6 °F (37 °C) -- 98 (!) 33 95 % --   22 0211 146/56 98.9 °F (37.2 °C) Axillary 95 (!) 29 96 % --   22 2128 153/58 -- -- 103 23 93 % --   22 1838 -- -- -- 89 20 99 % --   22 1835 -- -- -- 89 26 97 % --   22 1655 133/56 -- -- 96 (!) 31 96 % --   22 1407 168/57 99 °F (37.2 °C) Axillary 92 26 98 % 97.7 kg (215 lb 6.2 oz)   22 1200 148/59 -- -- 93 27 98 % --   22 1152 -- -- -- 99 22 100 % --   22 1148 -- -- -- 105 22 98 % --   22 1025 175/71 98.5 °F (36.9 °C) Axillary 103 25 96 % --   22 0757 -- -- -- 101 20 99 % --   22 0752 -- -- -- 103 20 97 % --   22 0748 -- -- -- -- -- 98 % --     I/O:  I/O last 3 completed shifts:  In: 240 [P.O.:240]  Out: 3500 [Urine:3500]    Physical Exam:  Physical Exam  Constitutional:       General: She is not  in acute distress.     Appearance: Normal appearance. She is not ill-appearing.   HENT:      Head: Normocephalic and atraumatic.      Right Ear: External ear normal.      Left Ear: External ear normal.   Eyes:      Extraocular Movements: Extraocular movements intact.      Conjunctiva/sclera: Conjunctivae normal.   Cardiovascular:      Rate and Rhythm: Normal rate and regular rhythm.   Pulmonary:      Effort: Pulmonary effort is normal. No respiratory distress.   Abdominal:      General: There is no distension.      Palpations: Abdomen is soft.      Tenderness: There is no abdominal tenderness.   Musculoskeletal:         General: No swelling or deformity.   Skin:     General: Skin is warm and dry.   Neurological:      Mental Status: She is alert and oriented to person, place, and time. Mental status is at baseline.         Results Review:     CBC    Results from last 7 days   Lab Units 12/08/22  0403 12/07/22  0348 12/06/22  0339 12/05/22  1553 12/05/22  0110 12/04/22  1801 12/04/22  1159 12/04/22  0605 12/03/22  2357 12/03/22  1110 12/03/22  0336   WBC 10*3/mm3 28.30* 29.50* 31.50*  --  29.90*  --  35.70*  --  37.60*  --  27.60*   HEMOGLOBIN g/dL 8.9* 9.1* 9.4* 9.1* 7.4* 8.1* 8.5*  8.8*   < > 7.4*   < > 9.3*   PLATELETS 10*3/mm3 315 306 310  --  250  --  288  --  290  --  360    < > = values in this interval not displayed.     BMP   Results from last 7 days   Lab Units 12/07/22  1426 12/06/22  0339 12/05/22  0110 12/04/22  0605 12/03/22  0056 12/02/22  0331   SODIUM mmol/L 151* 153* 149* 146* 149* 147*   POTASSIUM mmol/L 3.6 3.5 3.9 4.6 4.2 4.0   CHLORIDE mmol/L 115* 119* 113* 109* 108* 107   CO2 mmol/L 27.0 23.0 22.0 21.0* 28.0 30.0*   BUN mg/dL 32* 56* 85* 81* 48* 26*   CREATININE mg/dL 0.91 1.32* 2.28* 3.31* 1.73* 0.75   GLUCOSE mg/dL 153* 153* 130* 150* 224* 148*   MAGNESIUM mg/dL  --  2.6* 2.6* 2.4 2.3 2.3   PHOSPHORUS mg/dL  --  2.6 4.9* 5.5* 5.3* 4.3     Radiology(recent) No radiology results for the last  day    I reviewed the patient's new clinical results.    Assessment & Plan       Cerebrovascular accident (CVA), unspecified mechanism (HCC)    Aphasia    Obesity (BMI 30-39.9)    Tobacco abuse    Carotid stenosis, bilateral    Essential hypertension    Feeding difficulty    Contrast-induced nephropathy    Acute hypoxemic respiratory failure (HCC)    CAP (community acquired pneumonia)      72 y.o. female with retrorectus and pelvic hematoma.  Continue to trend Hgb. Continue to hold anticoagulation.  Tube feeds.  If WBC count does not trend down may ultimately need IR drainage of hematoma once Hgb stabilizes.        This note was created using Dragon Voice Recognition software.    Landry Rivas MD  12/08/22  06:50 EST

## 2022-12-08 NOTE — PROGRESS NOTES
[facility] Medicine Services Daily Progress Note    Patient Name: Cheyenne Estrella  : 1950  MRN: [id number]  Primary Care Physician:  Melba Rosen APRN  Date of admission: 2022      Subjective      Chief Complaint: Severe aphasia     No acute concerns overnight.  Remains unengaged and restless.  Ongoing soft right hand restraint to avoid pulling her lines and feeding tube.  No family at the bedside today. Hoping to DC home tomorrow on Hospice. She remains on 11 L high flow nasal cannula with tachycardia and slightly improved renal function.     Bedside rounding completed with the nursing staff. No other acute concerns.      Objective      Vitals:   Temp:  [97.7 °F (36.5 °C)-99 °F (37.2 °C)] 97.7 °F (36.5 °C)  Heart Rate:  [] 109  Resp:  [20-35] 35  BP: (114-168)/(55-60) 154/55  Flow (L/min):  [7-11] 7       Physical Exam  Vitals reviewed.   Constitutional:       Comments: No family at the bedside     HENT:      Head: Normocephalic.      Nose: Nose normal--Dobbhoff in place.      Mouth/Throat:      Mouth: Mucous membranes are moist.   Cardiovascular:      Rate and Rhythm: Normal rate and regular rhythm.      Pulses: Normal pulses.      Heart sounds: Normal heart sounds.   Pulmonary:      Effort: Pulmonary effort is normal.      Breath sounds: Normal breath sounds.   Abdominal:      General: Abdomen is flat.      Palpations: Abdomen is soft.   Musculoskeletal:         General: Normal range of motion.      Cervical back: Normal range of motion.   Skin:     General: Skin is warm.   Neurological:      General: No focal deficit present.      Mental Status: Fairly unresponsive, no meaningful interaction.      Comments: Patient is aphasic   Psychiatric:         Mood and Affect: Flat affect     Result Review    Result Review:  I have personally reviewed the results from the time of this admission to 2022 10:45 EST and agree with these findings:  [x]  Laboratory  [x]   Microbiology  [x]  Radiology  [x]  EKG/Telemetry   [x]  Cardiology/Vascular   []  Pathology  [x]  Old records  []  Other:  Assessment & Plan       Brief Patient Summary:  Cheyenne Estrella is a 72 y.o. female with PMHx of HTN, HLD, anxiety, CVA who presented to [facility] on 11/24/2022 complaining of aphasia.  Due to condition HPI obtained from family at bedside and available records.  LWK 1730. Patient started slurring speech on phone with family and EMS called.  Patient then became completely aphasic.  Denies chest pain, dyspnea, fevers, chills, numbness, tingling, or gait instability.  Presented to [facility] ED due to possibility of stroke     In the ED, vitals 98.3, HR 79, RR 24, /67, 91% RA.  Labs notable for troponin <0.01, glucose 110.  Stroke team called.  Pt outside window for Tpa.  CT head showed no acute abnormality but did show remote infarct in left basal ganglia extending into the left corona radiata along with small remote infarcts in the left thalamus and right basal ganglia.  CTA head/neck pending.  Patient admitted for neurological evaluation.        Active Hospital Problems:  Patient Active Problem List   Diagnosis   • Aphasia   • Obesity (BMI 30-39.9)   • Oral thrush   • Tobacco abuse   • Encephalopathy   • CVA (cerebral vascular accident) (HCC)   • Cerebrovascular accident (CVA), unspecified mechanism (HCC)   • Carotid stenosis, bilateral   • Essential hypertension   • Feeding difficulty   • Contrast-induced nephropathy   • Acute hypoxemic respiratory failure (HCC)   • CAP (community acquired pneumonia)      Plan:   Rectus sheath hematoma  Monitor H&H closely  Transfuse if less than 8.  Another unit of blood ordered December 5  GI will not place feeding tube due to bleeding risk     Aphasia-secondary to underlying infarct, possibility of evolving state.  Patient was established history of previous CVA within the last year was still in the process of rehabbing.  Patient's  granddaughter reports patient recently lost her spouse and had great deal of emotional stress, appears stable but not improving  -Neurology evaluated concern for progression or worsening  -Repeat MRI showing new areas of acute infarct in right precentral gyrus cortex and within inferior right frontal lobe  -CTA showing 60% stenosis of right CCA 50% stenosis of left CCA but not actionable at this time  -ED evaluated not criteria for tPA due to questionable timeframe  -Antiplatelet  -Echo showing normal EF no discussion of shunting  -PT/OT/speech  -May need discussion of PEG tube, family aware and on Dobhoff NG tube feeds  -B12 and thyroid function ordered  -Given underlying carotid disease vascular surgery consulted recommending medical management at this time can follow-up in clinic for further discussion  -Continue speech therapy  -Repeat CT of the head ordered for persistent lethargy.    No acute findings.  -Patient agreed to PEG tube placement and rehab on November 30.  GI consulted. Deferred due to spontaneous bleeding resulting in abdominal wall hematoma    Leukocytosis-left-sided pneumonia on imaging.  Likely reactive as well     Hypertension/hyperlipidemia/anxiety-chronic in nature  -Allow for permissive hypertension  -Resume home medication as clinically appropriate     Acute renal failure  Likely due to hypotension due to hemorrhage and contrast induced injury  Nephrology consulted, improved     Acute respiratory failure  ABG shows hypoxemia with PO2 in the 60s  Pulmonary consulted  Chest x-ray ordered shows left-sided consolidation/pneumonia. Supplemental oxygen and pulmonary consult ordered  Continuing treatment for pneumonia.  She has not had much in the way of clinical improvement.  Given likely upcoming hospice transition, aggressive interventions or consideration for sepsis would not be appropriate.     Persistent neck pain lidocaine patch ordered.  CT soft tissue neck negative     DVT  prophylaxis:  Mechanical DVT prophylaxis orders are present  She has not been on chemical DVT prophylaxis due to spontaneous bleeding and resolving hematoma.  There is currently no indication to transition her to anything from this standpoint given that she will be transitioning to hospice soon.    CODE STATUS:    Code Status (Patient has no pulse and is not breathing): CPR (Attempt to Resuscitate)  Medical Interventions (Patient has pulse or is breathing): Full Support  Release to patient: Routine Release     FMLA paperwork has been completed for patient's daughter     Disposition: Should DC home on hospice tomorrow.    Electronically signed by Ok Scott MD, 12/08/22, 10:45 EST.  [facility] Hospitalist Team

## 2022-12-09 VITALS
BODY MASS INDEX: 32.74 KG/M2 | WEIGHT: 216.05 LBS | DIASTOLIC BLOOD PRESSURE: 55 MMHG | SYSTOLIC BLOOD PRESSURE: 147 MMHG | HEIGHT: 68 IN | RESPIRATION RATE: 31 BRPM | HEART RATE: 96 BPM | TEMPERATURE: 99.4 F | OXYGEN SATURATION: 94 %

## 2022-12-09 LAB
ALBUMIN SERPL-MCNC: 2.8 G/DL (ref 3.5–5.2)
ALBUMIN/GLOB SERPL: 0.7 G/DL
ALP SERPL-CCNC: 119 U/L (ref 39–117)
ALT SERPL W P-5'-P-CCNC: 81 U/L (ref 1–33)
ANION GAP SERPL CALCULATED.3IONS-SCNC: 9 MMOL/L (ref 5–15)
AST SERPL-CCNC: 58 U/L (ref 1–32)
BILIRUB SERPL-MCNC: 0.6 MG/DL (ref 0–1.2)
BUN SERPL-MCNC: 31 MG/DL (ref 8–23)
BUN/CREAT SERPL: 40.3 (ref 7–25)
CALCIUM SPEC-SCNC: 8.8 MG/DL (ref 8.6–10.5)
CHLORIDE SERPL-SCNC: 110 MMOL/L (ref 98–107)
CO2 SERPL-SCNC: 26 MMOL/L (ref 22–29)
CREAT SERPL-MCNC: 0.77 MG/DL (ref 0.57–1)
DEPRECATED RDW RBC AUTO: 50.3 FL (ref 37–54)
EGFRCR SERPLBLD CKD-EPI 2021: 81.6 ML/MIN/1.73
ERYTHROCYTE [DISTWIDTH] IN BLOOD BY AUTOMATED COUNT: 15.5 % (ref 12.3–15.4)
GLOBULIN UR ELPH-MCNC: 3.8 GM/DL
GLUCOSE BLDC GLUCOMTR-MCNC: 170 MG/DL (ref 70–105)
GLUCOSE BLDC GLUCOMTR-MCNC: 179 MG/DL (ref 70–105)
GLUCOSE BLDC GLUCOMTR-MCNC: 192 MG/DL (ref 70–105)
GLUCOSE SERPL-MCNC: 161 MG/DL (ref 65–99)
HCT VFR BLD AUTO: 29.9 % (ref 34–46.6)
HGB BLD-MCNC: 9.4 G/DL (ref 12–15.9)
MCH RBC QN AUTO: 29 PG (ref 26.6–33)
MCHC RBC AUTO-ENTMCNC: 31.4 G/DL (ref 31.5–35.7)
MCV RBC AUTO: 92.4 FL (ref 79–97)
PLATELET # BLD AUTO: 375 10*3/MM3 (ref 140–450)
PMV BLD AUTO: 9.5 FL (ref 6–12)
POTASSIUM SERPL-SCNC: 3.6 MMOL/L (ref 3.5–5.2)
PROT SERPL-MCNC: 6.6 G/DL (ref 6–8.5)
RBC # BLD AUTO: 3.24 10*6/MM3 (ref 3.77–5.28)
SODIUM SERPL-SCNC: 145 MMOL/L (ref 136–145)
WBC NRBC COR # BLD: 29.7 10*3/MM3 (ref 3.4–10.8)

## 2022-12-09 PROCEDURE — 80053 COMPREHEN METABOLIC PANEL: CPT | Performed by: FAMILY MEDICINE

## 2022-12-09 PROCEDURE — 63710000001 INSULIN LISPRO (HUMAN) PER 5 UNITS: Performed by: NURSE PRACTITIONER

## 2022-12-09 PROCEDURE — 82962 GLUCOSE BLOOD TEST: CPT

## 2022-12-09 PROCEDURE — 85027 COMPLETE CBC AUTOMATED: CPT | Performed by: STUDENT IN AN ORGANIZED HEALTH CARE EDUCATION/TRAINING PROGRAM

## 2022-12-09 RX ADMIN — INSULIN LISPRO 2 UNITS: 100 INJECTION, SOLUTION INTRAVENOUS; SUBCUTANEOUS at 06:50

## 2022-12-09 RX ADMIN — INSULIN LISPRO 2 UNITS: 100 INJECTION, SOLUTION INTRAVENOUS; SUBCUTANEOUS at 12:16

## 2022-12-09 RX ADMIN — Medication 10 ML: at 08:36

## 2022-12-09 RX ADMIN — LIDOCAINE 1 PATCH: 50 PATCH CUTANEOUS at 08:35

## 2022-12-09 RX ADMIN — ACETAMINOPHEN 325 MG: 650 SUPPOSITORY RECTAL at 03:09

## 2022-12-09 RX ADMIN — INSULIN LISPRO 2 UNITS: 100 INJECTION, SOLUTION INTRAVENOUS; SUBCUTANEOUS at 00:39

## 2022-12-09 RX ADMIN — SERTRALINE 50 MG: 50 TABLET, FILM COATED ORAL at 08:35

## 2022-12-09 NOTE — PROGRESS NOTES
Nutrition Services    Patient Name: Cheyenne Estrella  YOB: 1950  MRN: [id number]  Admission date: 11/24/2022    PPE Documentation        PPE Worn By Provider Did not enter room for this encounter   PPE Worn By Patient  N/A     PROGRESS NOTE      Encounter Information: Progress note to check on TF. Isosource 1.5 running at 55 ml/hr per EMR with minimal residuals.        PO Diet: NPO Diet NPO Type: Strict NPO   PO Supplements: None    PO Intake:  N/a        Current nutrition support: Isosource 1.5 55 ml/hr + 90 ml/hr water flush + Prosource TF QD   Nutrition support review: TF rate increased 12/6 d/t more accurate wt to base needs on       Labs (reviewed below): Reviewed         GI Function:  Stool Output  Stool Unmeasured Occurrence: 1 (12/07/22 1021)  Bowel Incontinence: No (12/07/22 1021)  Stool Amount: other (see comments) (none) (12/08/22 4058)          Nutrition Intervention Updates: Continue current TF at goal      Results from last 7 days   Lab Units 12/09/22  0232 12/07/22  1426 12/06/22  0339   SODIUM mmol/L 145 151* 153*   POTASSIUM mmol/L 3.6 3.6 3.5   CHLORIDE mmol/L 110* 115* 119*   CO2 mmol/L 26.0 27.0 23.0   BUN mg/dL 31* 32* 56*   CREATININE mg/dL 0.77 0.91 1.32*   CALCIUM mg/dL 8.8 8.9 9.1   BILIRUBIN mg/dL 0.6 0.6 0.5   ALK PHOS U/L 119* 138* 125*   ALT (SGPT) U/L 81* 100* 96*   AST (SGOT) U/L 58* 96* 109*   GLUCOSE mg/dL 161* 153* 153*     Results from last 7 days   Lab Units 12/09/22  0232 12/07/22  0348 12/06/22  0339 12/05/22  1553 12/05/22  0110 12/04/22  1159 12/04/22  0605   MAGNESIUM mg/dL  --   --  2.6*  --  2.6*  --  2.4   PHOSPHORUS mg/dL  --   --  2.6  --  4.9*  --  5.5*   HEMOGLOBIN g/dL 9.4*   < > 9.4*   < > 7.4*   < > 8.7*   HEMATOCRIT % 29.9*   < > 30.0*   < > 22.7*   < > 27.6*    < > = values in this interval not displayed.     COVID19   Date Value Ref Range Status   02/18/2022 Not Detected Not Detected - Ref. Range Final     Lab Results   Component Value Date     HGBA1C 5.4 11/25/2022       RD to follow up per protocol.    Electronically signed by:  Mayra Godoy RD  12/09/22 08:23 EST

## 2022-12-09 NOTE — PROGRESS NOTES
Daily Progress Note        Cerebrovascular accident (CVA), unspecified mechanism (HCC)    Aphasia    Obesity (BMI 30-39.9)    Tobacco abuse    Carotid stenosis, bilateral    Essential hypertension    Feeding difficulty    Contrast-induced nephropathy    Acute hypoxemic respiratory failure (HCC)    CAP (community acquired pneumonia)      Assessment    Left lower lobe pneumonia questionable aspiration   cerebrovascular accident (CVA), unspecified mechanism (HCC)  Hypoxia  Bibasilar opacities: Atelectasis versus early pneumonia  Aphasia  KVNG  Spontaneous retrorectal hematoma  Acute anemia due to blood loss in the hematoma  History of tobacco smoking  Bilateral carotid stenosis  Essential hypertension      Recommendations:    Family meeting with hospice today to decide treatment plan    oxygen supplement and titration to maintain saturation 90 to 95%  -currently requiring 12 L high flow    Aspiration precautions, elevate head of bed  -Tube feeds, surgery following    Bronchodilator  Blood pressure support: Midodrine  Atorvastatin  DVT/GI prophylaxis  Check daily labs and correct electrolytes as needed    Completed 4 days of cefepime           LOS: 15 days     Subjective         Objective     Vital signs for last 24 hours:  Vitals:    12/09/22 0220 12/09/22 0300 12/09/22 0330 12/09/22 0604   BP: 140/43 142/59 144/51 127/46   BP Location: Left arm Left arm Left arm Left arm   Patient Position: Lying Sitting Sitting Lying   Pulse: 114 115 105 108   Resp: (!) 31   28   Temp: (!) 101.1 °F (38.4 °C)   99.9 °F (37.7 °C)   TempSrc: Axillary   Axillary   SpO2: 91% 90% 92%    Weight:    98 kg (216 lb 0.8 oz)   Height:           Intake/Output last 3 shifts:  I/O last 3 completed shifts:  In: 240 [P.O.:240]  Out: 3450 [Urine:3450]  Intake/Output this shift:  I/O this shift:  In: -   Out: 1200 [Urine:1200]      Radiology  Imaging Results (Last 24 Hours)     ** No results found for the last 24 hours. **          Labs:  Results from  last 7 days   Lab Units 12/09/22  0232   WBC 10*3/mm3 29.70*   HEMOGLOBIN g/dL 9.4*   HEMATOCRIT % 29.9*   PLATELETS 10*3/mm3 375     Results from last 7 days   Lab Units 12/09/22  0232   SODIUM mmol/L 145   POTASSIUM mmol/L 3.6   CHLORIDE mmol/L 110*   CO2 mmol/L 26.0   BUN mg/dL 31*   CREATININE mg/dL 0.77   CALCIUM mg/dL 8.8   BILIRUBIN mg/dL 0.6   ALK PHOS U/L 119*   ALT (SGPT) U/L 81*   AST (SGOT) U/L 58*   GLUCOSE mg/dL 161*     Results from last 7 days   Lab Units 12/03/22  1314   PH, ARTERIAL pH units 7.443   PO2 ART mm Hg 63.9*   PCO2, ARTERIAL mm Hg 36.6   HCO3 ART mmol/L 25.0     Results from last 7 days   Lab Units 12/09/22  0232 12/07/22  1426 12/06/22  0339   ALBUMIN g/dL 2.80* 3.00* 3.10*             Results from last 7 days   Lab Units 12/06/22  0339   MAGNESIUM mg/dL 2.6*     Results from last 7 days   Lab Units 12/03/22  0336   INR  1.05   APTT seconds 33.1*               Meds:   SCHEDULE  atorvastatin, 80 mg, Per G Tube, Nightly  bisacodyl, 10 mg, Rectal, Once  insulin lispro, 2-7 Units, Subcutaneous, Q6H  lidocaine, 1 patch, Transdermal, Q24H  midodrine, 10 mg, Nasogastric, TID AC  sertraline, 50 mg, Per G Tube, Daily  sodium chloride, 10 mL, Intravenous, Q12H      Infusions     PRNs  •  acetaminophen  •  bisacodyl  •  dextrose  •  dextrose  •  glucagon (human recombinant)  •  hydrALAZINE  •  ipratropium-albuterol  •  labetalol  •  LORazepam  •  Morphine  •  ondansetron  •  sodium chloride  •  sodium chloride  •  sodium chloride  •  sodium chloride    Physical Exam:  Physical Exam  Vitals reviewed.   Constitutional:       Appearance: She is ill-appearing.   Pulmonary:      Breath sounds: Rhonchi and rales present.   Skin:     General: Skin is warm and dry.         ROS  Review of Systems   Unable to perform ROS: Mental status change       I have reviewed the patient's new clinical results.    Electronically signed by Naz Walls MD

## 2022-12-09 NOTE — CASE MANAGEMENT/SOCIAL WORK
Continued Stay Note  DIANNE Bryant     Patient Name: Cheyenne Estrella  MRN: [id number]  Today's Date: 12/9/2022    Admit Date: 11/24/2022    Plan: [facility].  Home today.   Discharge Plan     Row Name 12/09/22 1436       Plan    Plan [facility].  Home today.             Expected Discharge Date and Time     Expected Discharge Date Expected Discharge Time    Dec 9, 2022             Gia Chavez RN

## 2022-12-09 NOTE — PLAN OF CARE
Goal Outcome Evaluation:     Problem: Adult Inpatient Plan of Care  Goal: Plan of Care Review  Outcome: Ongoing, Progressing  Goal: Patient-Specific Goal (Individualized)  Outcome: Ongoing, Progressing  Goal: Absence of Hospital-Acquired Illness or Injury  Outcome: Ongoing, Progressing  Intervention: Identify and Manage Fall Risk  Recent Flowsheet Documentation  Taken 12/9/2022 0306 by Terese Potter RN  Safety Promotion/Fall Prevention:   safety round/check completed   room organization consistent   fall prevention program maintained   clutter free environment maintained   assistive device/personal items within reach  Taken 12/9/2022 0200 by Terese Potter RN  Safety Promotion/Fall Prevention:   safety round/check completed   room organization consistent   fall prevention program maintained   clutter free environment maintained   assistive device/personal items within reach  Taken 12/9/2022 0000 by Terese Potter RN  Safety Promotion/Fall Prevention:   safety round/check completed   room organization consistent   fall prevention program maintained   clutter free environment maintained   assistive device/personal items within reach  Taken 12/8/2022 2200 by Terese Potter RN  Safety Promotion/Fall Prevention:   safety round/check completed   room organization consistent   fall prevention program maintained   clutter free environment maintained   assistive device/personal items within reach  Taken 12/8/2022 2020 by Terese Potter RN  Safety Promotion/Fall Prevention:   safety round/check completed   room organization consistent   fall prevention program maintained   clutter free environment maintained   assistive device/personal items within reach  Intervention: Prevent Skin Injury  Recent Flowsheet Documentation  Taken 12/9/2022 0306 by Terese Potter RN  Skin Protection: adhesive use limited  Taken 12/9/2022 0000 by Terese Potter RN  Skin Protection: adhesive use limited  Intervention: Prevent and Manage VTE (Venous  Thromboembolism) Risk  Recent Flowsheet Documentation  Taken 12/9/2022 0306 by Terese Potter RN  Activity Management: activity adjusted per tolerance  Taken 12/9/2022 0000 by Terese Potter RN  Activity Management: activity adjusted per tolerance  Taken 12/8/2022 2020 by Terese Potter RN  Activity Management: activity adjusted per tolerance  VTE Prevention/Management:   bilateral   sequential compression devices on  Intervention: Prevent Infection  Recent Flowsheet Documentation  Taken 12/9/2022 0306 by Terese Potter RN  Infection Prevention:   hand hygiene promoted   rest/sleep promoted   personal protective equipment utilized  Taken 12/9/2022 0200 by Terese Potter RN  Infection Prevention:   hand hygiene promoted   personal protective equipment utilized   rest/sleep promoted  Taken 12/9/2022 0000 by Terese Potter RN  Infection Prevention:   hand hygiene promoted   personal protective equipment utilized   rest/sleep promoted  Taken 12/8/2022 2200 by Terese Potter RN  Infection Prevention:   hand hygiene promoted   personal protective equipment utilized   rest/sleep promoted  Taken 12/8/2022 2020 by Terese Potter RN  Infection Prevention:   hand hygiene promoted   personal protective equipment utilized   rest/sleep promoted  Goal: Optimal Comfort and Wellbeing  Outcome: Ongoing, Progressing  Intervention: Provide Person-Centered Care  Recent Flowsheet Documentation  Taken 12/9/2022 0306 by Terese Potter RN  Trust Relationship/Rapport: care explained  Taken 12/9/2022 0000 by Terese Potter RN  Trust Relationship/Rapport: care explained  Taken 12/8/2022 2020 by Terese Potter RN  Trust Relationship/Rapport: care explained  Goal: Readiness for Transition of Care  Outcome: Ongoing, Progressing     Problem: Adjustment to Illness (Stroke, Ischemic/Transient Ischemic Attack)  Goal: Optimal Coping  Outcome: Ongoing, Progressing  Intervention: Support Psychosocial Response to Stroke  Recent Flowsheet Documentation  Taken  12/9/2022 0306 by Terese Potter RN  Family/Support System Care: support provided  Taken 12/9/2022 0000 by Terese Potter RN  Family/Support System Care: support provided  Taken 12/8/2022 2020 by Terese Potter RN  Family/Support System Care: support provided     Problem: Bowel Elimination Impaired (Stroke, Ischemic/Transient Ischemic Attack)  Goal: Effective Bowel Elimination  Outcome: Ongoing, Progressing     Problem: Cerebral Tissue Perfusion (Stroke, Ischemic/Transient Ischemic Attack)  Goal: Optimal Cerebral Tissue Perfusion  Outcome: Ongoing, Progressing     Problem: Cognitive Impairment (Stroke, Ischemic/Transient Ischemic Attack)  Goal: Optimal Cognitive Function  Outcome: Ongoing, Progressing     Problem: Communication Impairment (Stroke, Ischemic/Transient Ischemic Attack)  Goal: Improved Communication Skills  Outcome: Ongoing, Progressing  Intervention: Optimize Communication Skills  Recent Flowsheet Documentation  Taken 12/9/2022 0000 by Terese Potter RN  Communication Enhancement Strategies: call light answered in person  Taken 12/8/2022 2020 by Terese Potter RN  Communication Enhancement Strategies: call light answered in person     Problem: Functional Ability Impaired (Stroke, Ischemic/Transient Ischemic Attack)  Goal: Optimal Functional Ability  Outcome: Ongoing, Progressing  Intervention: Optimize Functional Ability  Recent Flowsheet Documentation  Taken 12/9/2022 0306 by Terese Potter RN  Activity Management: activity adjusted per tolerance  Taken 12/9/2022 0000 by Terese Potter RN  Activity Management: activity adjusted per tolerance  Taken 12/8/2022 2020 by Terese Potter RN  Activity Management: activity adjusted per tolerance     Problem: Respiratory Compromise (Stroke, Ischemic/Transient Ischemic Attack)  Goal: Effective Oxygenation and Ventilation  Outcome: Ongoing, Progressing     Problem: Sensorimotor Impairment (Stroke, Ischemic/Transient Ischemic Attack)  Goal: Improved Sensorimotor  Function  Outcome: Ongoing, Progressing  Intervention: Optimize Sensory and Perceptual Ability  Recent Flowsheet Documentation  Taken 12/9/2022 0306 by Terese Potter RN  Pressure Reduction Techniques:   frequent weight shift encouraged   weight shift assistance provided  Pressure Reduction Devices: pressure-redistributing mattress utilized  Taken 12/9/2022 0000 by Terese Potter RN  Pressure Reduction Techniques:   frequent weight shift encouraged   weight shift assistance provided  Pressure Reduction Devices: pressure-redistributing mattress utilized  Taken 12/8/2022 2020 by Terese Potter RN  Pressure Reduction Techniques:   weight shift assistance provided   frequent weight shift encouraged  Pressure Reduction Devices: pressure-redistributing mattress utilized     Problem: Swallowing Impairment (Stroke, Ischemic/Transient Ischemic Attack)  Goal: Optimal Eating and Swallowing without Aspiration  Outcome: Ongoing, Progressing  Intervention: Optimize Eating and Swallowing  Recent Flowsheet Documentation  Taken 12/9/2022 0306 by Terese Potter RN  Aspiration Precautions: upright posture maintained  Taken 12/9/2022 0000 by Terese Potter RN  Aspiration Precautions: upright posture maintained  Taken 12/8/2022 2020 by Terese Potter RN  Aspiration Precautions: upright posture maintained     Problem: Urinary Elimination Impaired (Stroke, Ischemic/Transient Ischemic Attack)  Goal: Effective Urinary Elimination  Outcome: Ongoing, Progressing     Problem: Asthma Comorbidity  Goal: Maintenance of Asthma Control  Outcome: Ongoing, Progressing  Intervention: Maintain Asthma Symptom Control  Recent Flowsheet Documentation  Taken 12/8/2022 2020 by Terese Potter RN  Medication Review/Management: medications reviewed     Problem: Behavioral Health Comorbidity  Goal: Maintenance of Behavioral Health Symptom Control  Outcome: Ongoing, Progressing  Intervention: Maintain Behavioral Health Symptom Control  Recent Flowsheet  Documentation  Taken 12/8/2022 2020 by Terese Potter RN  Medication Review/Management: medications reviewed     Problem: COPD (Chronic Obstructive Pulmonary Disease) Comorbidity  Goal: Maintenance of COPD Symptom Control  Outcome: Ongoing, Progressing  Intervention: Maintain COPD-Symptom Control  Recent Flowsheet Documentation  Taken 12/8/2022 2020 by Terese Potter RN  Medication Review/Management: medications reviewed     Problem: Diabetes Comorbidity  Goal: Blood Glucose Level Within Targeted Range  Outcome: Ongoing, Progressing     Problem: Heart Failure Comorbidity  Goal: Maintenance of Heart Failure Symptom Control  Outcome: Ongoing, Progressing  Intervention: Maintain Heart Failure-Management  Recent Flowsheet Documentation  Taken 12/8/2022 2020 by Terese Potter RN  Medication Review/Management: medications reviewed     Problem: Hypertension Comorbidity  Goal: Blood Pressure in Desired Range  Outcome: Ongoing, Progressing  Intervention: Maintain Blood Pressure Management  Recent Flowsheet Documentation  Taken 12/8/2022 2020 by Terese Potter RN  Medication Review/Management: medications reviewed     Problem: Obstructive Sleep Apnea Risk or Actual Comorbidity Management  Goal: Unobstructed Breathing During Sleep  Outcome: Ongoing, Progressing     Problem: Osteoarthritis Comorbidity  Goal: Maintenance of Osteoarthritis Symptom Control  Outcome: Ongoing, Progressing  Intervention: Maintain Osteoarthritis Symptom Control  Recent Flowsheet Documentation  Taken 12/9/2022 0306 by Terese Potter RN  Activity Management: activity adjusted per tolerance  Taken 12/9/2022 0000 by Terese Potter RN  Activity Management: activity adjusted per tolerance  Taken 12/8/2022 2020 by Terese Potter RN  Activity Management: activity adjusted per tolerance  Medication Review/Management: medications reviewed     Problem: Pain Chronic (Persistent) (Comorbidity Management)  Goal: Acceptable Pain Control and Functional Ability  Outcome:  Ongoing, Progressing  Intervention: Manage Persistent Pain  Recent Flowsheet Documentation  Taken 12/8/2022 2020 by Terese Potter RN  Medication Review/Management: medications reviewed  Intervention: Optimize Psychosocial Wellbeing  Recent Flowsheet Documentation  Taken 12/9/2022 0306 by Terese Potter RN  Diversional Activities: television  Family/Support System Care: support provided  Taken 12/9/2022 0200 by Terese Potter RN  Diversional Activities: television  Taken 12/9/2022 0000 by Terese Potter RN  Diversional Activities: television  Family/Support System Care: support provided  Taken 12/8/2022 2200 by Terese Potter RN  Diversional Activities: television  Taken 12/8/2022 2020 by Terese Potter RN  Diversional Activities: television  Family/Support System Care: support provided  Taken 12/8/2022 2000 by Terese Potter RN  Diversional Activities: television     Problem: Seizure Disorder Comorbidity  Goal: Maintenance of Seizure Control  Outcome: Ongoing, Progressing     Problem: Skin Injury Risk Increased  Goal: Skin Health and Integrity  Outcome: Ongoing, Progressing  Intervention: Optimize Skin Protection  Recent Flowsheet Documentation  Taken 12/9/2022 0306 by Terese Potter RN  Pressure Reduction Techniques:   frequent weight shift encouraged   weight shift assistance provided  Pressure Reduction Devices: pressure-redistributing mattress utilized  Skin Protection: adhesive use limited  Taken 12/9/2022 0000 by Terese Potter RN  Pressure Reduction Techniques:   frequent weight shift encouraged   weight shift assistance provided  Pressure Reduction Devices: pressure-redistributing mattress utilized  Skin Protection: adhesive use limited  Taken 12/8/2022 2020 by Terese Potter RN  Pressure Reduction Techniques:   weight shift assistance provided   frequent weight shift encouraged  Pressure Reduction Devices: pressure-redistributing mattress utilized     Problem: Fall Injury Risk  Goal: Absence of Fall and Fall-Related  Injury  Outcome: Ongoing, Progressing  Intervention: Identify and Manage Contributors  Recent Flowsheet Documentation  Taken 12/8/2022 2020 by Terese Potter RN  Medication Review/Management: medications reviewed  Intervention: Promote Injury-Free Environment  Recent Flowsheet Documentation  Taken 12/9/2022 0306 by Terese Potter RN  Safety Promotion/Fall Prevention:   safety round/check completed   room organization consistent   fall prevention program maintained   clutter free environment maintained   assistive device/personal items within reach  Taken 12/9/2022 0200 by Terese Potter RN  Safety Promotion/Fall Prevention:   safety round/check completed   room organization consistent   fall prevention program maintained   clutter free environment maintained   assistive device/personal items within reach  Taken 12/9/2022 0000 by Terese Potter RN  Safety Promotion/Fall Prevention:   safety round/check completed   room organization consistent   fall prevention program maintained   clutter free environment maintained   assistive device/personal items within reach  Taken 12/8/2022 2200 by Terese Potter RN  Safety Promotion/Fall Prevention:   safety round/check completed   room organization consistent   fall prevention program maintained   clutter free environment maintained   assistive device/personal items within reach  Taken 12/8/2022 2020 by Terese Potter RN  Safety Promotion/Fall Prevention:   safety round/check completed   room organization consistent   fall prevention program maintained   clutter free environment maintained   assistive device/personal items within reach     Problem: Aspiration (Enteral Nutrition)  Goal: Absence of Aspiration Signs and Symptoms  Outcome: Ongoing, Progressing     Problem: Device-Related Complication Risk (Enteral Nutrition)  Goal: Safe, Effective Therapy Delivery  Outcome: Ongoing, Progressing     Problem: Feeding Intolerance (Enteral Nutrition)  Goal: Feeding Tolerance  Outcome:  Ongoing, Progressing     Problem: Restraint, Nonbehavioral (Nonviolent)  Goal: Absence of Harm or Injury  Outcome: Ongoing, Progressing  Intervention: Implement Least Restrictive Safety Strategies  Recent Flowsheet Documentation  Taken 12/9/2022 0306 by Terese Potter RN  Medical Device Protection: IV pole/bag removed from visual field  Diversional Activities: television  Taken 12/9/2022 0200 by Terese Potter RN  Medical Device Protection: IV pole/bag removed from visual field  Less Restrictive Alternative:   bed alarm in use   emotional support provided  De-Escalation Techniques:   increased round frequency   stimulation decreased  Diversional Activities: television  Taken 12/9/2022 0000 by Terese Potter RN  Medical Device Protection: IV pole/bag removed from visual field  Less Restrictive Alternative:   bed alarm in use   emotional support provided  De-Escalation Techniques:   stimulation decreased   increased round frequency  Diversional Activities: television  Taken 12/8/2022 2200 by Terese Potter RN  Medical Device Protection: IV pole/bag removed from visual field  Less Restrictive Alternative:   bed alarm in use   emotional support provided  De-Escalation Techniques:   increased round frequency   stimulation decreased  Diversional Activities: television  Taken 12/8/2022 2020 by Terese Potter RN  Medical Device Protection: IV pole/bag removed from visual field  Diversional Activities: television  Taken 12/8/2022 2000 by Terese Potter RN  Medical Device Protection: IV pole/bag removed from visual field  Less Restrictive Alternative:   bed alarm in use   emotional support provided  De-Escalation Techniques:   increased round frequency   stimulation decreased  Diversional Activities: television  Intervention: Protect Dignity, Rights, and Personal Wellbeing  Recent Flowsheet Documentation  Taken 12/9/2022 0306 by Terese Potter RN  Trust Relationship/Rapport: care explained  Taken 12/9/2022 0000 by Terese Potter  RN  Trust Relationship/Rapport: care explained  Taken 12/8/2022 2020 by Terese Potter RN  Trust Relationship/Rapport: care explained

## 2022-12-09 NOTE — PLAN OF CARE
Problem: Adult Inpatient Plan of Care  Goal: Absence of Hospital-Acquired Illness or Injury  Intervention: Identify and Manage Fall Risk  Recent Flowsheet Documentation  Taken 12/9/2022 1400 by Alicia Ceballos RN  Safety Promotion/Fall Prevention:   activity supervised   assistive device/personal items within reach   clutter free environment maintained   fall prevention program maintained   nonskid shoes/slippers when out of bed   safety round/check completed  Taken 12/9/2022 1200 by Alicia Ceballos RN  Safety Promotion/Fall Prevention:   assistive device/personal items within reach   activity supervised   clutter free environment maintained   fall prevention program maintained   nonskid shoes/slippers when out of bed   safety round/check completed  Taken 12/9/2022 1119 by Alicia Ceballos RN  Safety Promotion/Fall Prevention:   activity supervised   assistive device/personal items within reach   clutter free environment maintained   fall prevention program maintained   nonskid shoes/slippers when out of bed   safety round/check completed  Taken 12/9/2022 1000 by Alicia Ceballos RN  Safety Promotion/Fall Prevention:   activity supervised   assistive device/personal items within reach   clutter free environment maintained   fall prevention program maintained   nonskid shoes/slippers when out of bed   safety round/check completed  Taken 12/9/2022 0830 by Alicia Ceballos RN  Safety Promotion/Fall Prevention:   activity supervised   assistive device/personal items within reach   clutter free environment maintained   fall prevention program maintained   nonskid shoes/slippers when out of bed   safety round/check completed  Intervention: Prevent Skin Injury  Recent Flowsheet Documentation  Taken 12/9/2022 0830 by Alicia Ceballos RN  Skin Protection:   adhesive use limited   tubing/devices free from skin contact  Intervention: Prevent and Manage VTE (Venous Thromboembolism) Risk  Recent Flowsheet Documentation  Taken  12/9/2022 0830 by Alicia Ceballos RN  VTE Prevention/Management:   bilateral   sequential compression devices on  Intervention: Prevent Infection  Recent Flowsheet Documentation  Taken 12/9/2022 1400 by Alicia Ceballos RN  Infection Prevention: environmental surveillance performed  Taken 12/9/2022 1200 by Alicia Ceballos RN  Infection Prevention: environmental surveillance performed  Taken 12/9/2022 1119 by Alicia Ceballos RN  Infection Prevention: environmental surveillance performed  Taken 12/9/2022 1000 by Alicia Ceballos RN  Infection Prevention: environmental surveillance performed  Taken 12/9/2022 0830 by Alicia Ceballos RN  Infection Prevention: environmental surveillance performed  Goal: Optimal Comfort and Wellbeing  Intervention: Provide Person-Centered Care  Recent Flowsheet Documentation  Taken 12/9/2022 0830 by Alicia Ceballos RN  Trust Relationship/Rapport: care explained     Problem: Adult Inpatient Plan of Care  Goal: Absence of Hospital-Acquired Illness or Injury  Intervention: Prevent Skin Injury  Recent Flowsheet Documentation  Taken 12/9/2022 0830 by Alicia Ceballos RN  Skin Protection:   adhesive use limited   tubing/devices free from skin contact   Goal Outcome Evaluation:               Patient is dc to home with hospice.

## 2022-12-09 NOTE — PLAN OF CARE
Goal Outcome Evaluation:   Chart reviewed and events noted. Pt now has d/c orders and is going home with [facility]. DHT pulled. ST will sign off at this time.

## 2022-12-09 NOTE — SIGNIFICANT NOTE
12/09/22 0743   OTHER   Discipline occupational therapist   Rehab Time/Intention   Session Not Performed unable to treat, medical status change  ([facility] pending acceptance, will keep on caseload and follow-up on desire for therapy)   Recommendation   OT - Next Appointment 12/12/22

## 2022-12-09 NOTE — DISCHARGE SUMMARY
[facility] Medicine Services  Discharge Summary    Date of Service: 2022  Patient Name: Cheyenne Estrella  : 1950  MRN: [id number]    Date of Admission: 2022  Discharge Diagnosis: Stroke  Date of Discharge:  2022  Primary Care Physician: Melba Rosen APRN    Presenting Problem:   Cerebrovascular accident (CVA), unspecified mechanism (HCC) [I63.9]    Active and Resolved Hospital Problems:  Active Hospital Problems    Diagnosis POA   • **Cerebrovascular accident (CVA), unspecified mechanism (HCC) [I63.9] Yes     Priority: High   • Contrast-induced nephropathy [N14.11, T50.8X5A] No     Priority: High   • Acute hypoxemic respiratory failure (HCC) [J96.01] Yes     Priority: High   • CAP (community acquired pneumonia) [J18.9] Yes     Priority: High   • Feeding difficulty [R63.30] Yes     Priority: High   • Aphasia [R47.01] Yes     Priority: High   • Carotid stenosis, bilateral [I65.23] Yes     Priority: Medium   • Essential hypertension [I10] Yes     Priority: Medium   • Tobacco abuse [Z72.0] Yes     Priority: Low   • Obesity (BMI 30-39.9) [E66.9] Yes     Priority: Low      Resolved Hospital Problems    Diagnosis POA   • Hypertensive urgency [I16.0] Yes     Priority: High       Hospital Course     HPI:  Cheyenne Estrella is a 72 y.o. female with PMHx of HTN, HLD, anxiety, CVA who presented to [facility] on 2022 complaining of aphasia.  Due to condition HPI obtained from family at bedside and available records.  LWK 1730. Patient started slurring speech on phone with family and EMS called.  Patient then became completely aphasic.  Denies chest pain, dyspnea, fevers, chills, numbness, tingling, or gait instability.  Presented to [facility] ED due to possibility of stroke     In the ED, vitals 98.3, HR 79, RR 24, /67, 91% RA.  Labs notable for troponin <0.01, glucose 110.  Stroke team called.  Pt outside window for Tpa.  CT head showed no acute  abnormality but did show remote infarct in left basal ganglia extending into the left corona radiata along with small remote infarcts in the left thalamus and right basal ganglia.  CTA head/neck pending.  Patient to be admitted for neurological evaluation.    Hospital course:  She was admitted in consultation with neurology, vascular surgery, gastroenterology, general surgery, pulmonology, nephrology, and palliative care.  She was found to have acute stroke on imaging as described below.  CTA of the head and neck showed 60% right carotid stenosis and 50% left carotid stenosis.  She was on antiplatelet therapy, and family had decided to do a PEG tube, but she unfortunately developed a spontaneous abdominal sheath bleed resulting in significant hematoma formation.  She remained on Dobbhoff tube feeds throughout her stay.  She had no neurologic improvement, and anticoagulation was held due to her spontaneous bleeding.  There was some concern for possible left-sided pneumonia on imaging, and she has been on antibiotics throughout her hospital stay.  She has been requiring high flow supplemental oxygen, which might suggest aspiration with her stroke event.  Her acute kidney injury improved with supportive care.  Given her poor prognosis, the family was agreeable to engaging with palliative care and ultimately decided to engage with hospice services at home.  Her Dobbhoff tube was pulled today, and she will discharge home via ambulance with ongoing [facility] support.    Day of Discharge     Vital Signs:  Temp:  [97.8 °F (36.6 °C)-101.1 °F (38.4 °C)] 97.8 °F (36.6 °C)  Heart Rate:  [] 110  Resp:  [25-36] 36  BP: (127-147)/(41-59) 135/48  Flow (L/min):  [7-12] 11    Physical Exam:    GEN: Elderly woman, restless but in no acute distress  HEENT: NCAT, PERRLA, moist mucous membranes  NECK: Supple, midline trachea  CARD: RRR, tachycardic  PULM: Coarse bilateral breath sounds, mildly labored breathing  ABD: soft,  NTND, normoactive bowel sounds throughout  SKIN: Warm, dry  NEURO: Grossly intact, aphasic and fairly unresponsive with no meaningful interaction  PSYCH: Unable to assess    Pertinent  and/or Most Recent Results     LAB RESULTS:      Lab 12/09/22  0232 12/08/22  0403 12/07/22  0348 12/06/22  0339 12/05/22  1553 12/05/22  0110 12/04/22  1801 12/04/22  1159 12/04/22  0605 12/03/22  2357 12/03/22  1110 12/03/22  0336 12/03/22  0056   0000   WBC 29.70* 28.30* 29.50* 31.50*  --  29.90*  --  35.70*  --  37.60*  --  27.60*  --    < >   HEMOGLOBIN 9.4* 8.9* 9.1* 9.4* 9.1* 7.4*   < > 8.5*  8.8* 8.7* 7.4*   < > 9.3*  --   --    HEMATOCRIT 29.9* 27.1* 27.8* 30.0* 28.8* 22.7*   < > 26.1*  27.2* 27.6* 23.5*   < > 29.8*  --   --    PLATELETS 375 315 306 310  --  250  --  288  --  290  --  360  --    < >   NEUTROS ABS  --   --   --  28.35*  --  26.40*  --  26.42*  --  34.22*  --  24.84*  --   --    LYMPHS ABS  --   --   --   --   --  1.50  --   --   --   --   --   --   --   --    MONOS ABS  --   --   --   --   --  1.90*  --   --   --   --   --   --   --   --    EOS ABS  --   --   --   --   --  0.00  --  0.36  --   --   --   --   --   --    MCV 92.4 89.5 90.4 91.9  --  91.4  --  91.5  --  92.7  --  93.2  --    < >   SED RATE  --   --   --   --   --  40*  --   --   --  42*  --   --   --   --    CRP  --   --   --   --   --  31.37*  --   --  24.07*  --   --   --   --   --    PROCALCITONIN  --   --   --   --   --  0.55*  --   --  0.86*  --   --   --   --   --    LACTATE  --   --   --   --   --   --   --   --  1.2  --   --  1.8 2.2*  --    PROTIME  --   --   --   --   --   --   --   --   --   --   --  10.8  --   --    APTT  --   --   --   --   --   --   --   --   --   --   --  33.1*  --   --     < > = values in this interval not displayed.         Lab 12/09/22  0232 12/07/22  1426 12/06/22  0339 12/05/22  0110 12/04/22  0605 12/03/22  0056   SODIUM 145 151* 153* 149* 146* 149*   POTASSIUM 3.6 3.6 3.5 3.9 4.6 4.2   CHLORIDE 110* 115*  119* 113* 109* 108*   CO2 26.0 27.0 23.0 22.0 21.0* 28.0   ANION GAP 9.0 9.0 11.0 14.0 16.0* 13.0   BUN 31* 32* 56* 85* 81* 48*   CREATININE 0.77 0.91 1.32* 2.28* 3.31* 1.73*   EGFR 81.6 66.8 42.7* 22.2* 14.2* 30.9*   GLUCOSE 161* 153* 153* 130* 150* 224*   CALCIUM 8.8 8.9 9.1 8.5* 8.3* 9.3   MAGNESIUM  --   --  2.6* 2.6* 2.4 2.3   PHOSPHORUS  --   --  2.6 4.9* 5.5* 5.3*         Lab 12/09/22  0232 12/07/22  1426 12/06/22  0339 12/05/22  0110 12/04/22  0605   TOTAL PROTEIN 6.6 6.6 6.5 6.2 6.1   ALBUMIN 2.80* 3.00* 3.10* 2.80* 2.90*   GLOBULIN 3.8 3.6 3.4 3.4 3.2   ALT (SGPT) 81* 100* 96* 49* 53*   AST (SGOT) 58* 96* 109* 30 25   BILIRUBIN 0.6 0.6 0.5 0.3 0.4   ALK PHOS 119* 138* 125* 111 114         Lab 12/03/22  1255 12/03/22  0336   PROBNP 2,191.0*  --    PROTIME  --  10.8   INR  --  1.05             Lab 12/03/22  1926   ABO TYPING B   RH TYPING Positive   ANTIBODY SCREEN Negative         Lab 12/03/22  1314   PH, ARTERIAL 7.443   PCO2, ARTERIAL 36.6   PO2 ART 63.9*   O2 SATURATION ART 93.0*   FIO2 44   HCO3 ART 25.0   BASE EXCESS ART 1.0     Brief Urine Lab Results  (Last result in the past 365 days)      Color   Clarity   Blood   Leuk Est   Nitrite   Protein   CREAT   Urine HCG        11/27/22 2220 Dark Yellow   Clear   Trace   Trace   Negative   30 mg/dL (1+)               Microbiology Results (last 10 days)     Procedure Component Value - Date/Time    Blood Culture - Blood, Hand, Right [[id number]]  (Normal) Collected: 12/03/22 0335    Lab Status: Final result Specimen: Blood from Hand, Right Updated: 12/08/22 0400     Blood Culture No growth at 5 days    Narrative:      Less than seven (7) mL's of blood was collected.  Insufficient quantity may yield false negative results.    MRSA Screen, PCR (Inpatient) - Swab, Nares [[id number]]  (Normal) Collected: 12/03/22 0334    Lab Status: Final result Specimen: Swab from Nares Updated: 12/03/22 0455     MRSA PCR No MRSA Detected    Narrative:      The negative predictive  value of this diagnostic test is high and should only be used to consider de-escalating anti-MRSA therapy. A positive result may indicate colonization with MRSA and must be correlated clinically.    Blood Culture - Blood, Hand, Left [[id number]]  (Normal) Collected: 12/03/22 0328    Lab Status: Final result Specimen: Blood from Hand, Left Updated: 12/08/22 0400     Blood Culture No growth at 5 days    Narrative:      Less than seven (7) mL's of blood was collected.  Insufficient quantity may yield false negative results.          CT Abdomen Pelvis Without Contrast    Result Date: 12/4/2022  Impression:  1. Left rectus sheath hematoma with hemorrhage extending into the left aspect of the pelvis. The pelvic hematoma is of similar size when compared to previous study. The hemorrhage now demonstrate hyperdense clot. There are also hyperdense clots seen within the left rectus sheath hematoma. There is no contrast enhancement to indicate active bleeding. 2. There is now more diffuse hemorrhage seen within the pelvis slightly increased from the previous exam. 3. No retroperitoneal hemorrhage. 4. Distended gallbladder with cholelithiasis. 5. Colonic diverticulosis without acute diverticulitis. 6. Interval development of a trace left basilar pleural effusion with left lower lobe airspace disease suspicious for pneumonia. There also may be patchy pneumonia in the right lower lobe. 7. Additional findings as noted above.  Electronically Signed By-Krystian Roberts MD On:12/4/2022 6:17 PM This report was finalized on [id number] by  Krystian Roberts MD.    CT Abdomen Pelvis Without Contrast    Result Date: 12/3/2022  Impression: 1.  Large left pelvic hematoma (~16 x 10 x 10 cm), with prominent volume of surrounding blood products, blood products extend to the left rectus sheath hematoma (~8 x 2 x 11). 2.  Lack of intravenous contrast limits evaluation for active bleeding. 3.  Suggestion of aspiration/bronchiolitis (bronchial wall  thickening, peribronchial opacities) 4.  Cholelithiasis, slightly distended gallbladder, correlate for right upper quadrant symptoms which could suggest cholecystitis. Report called to JARED Meraz at 12/3/2022 12:21 AM   Electronically signed by:  Amanda Trotter M.D.  12/3/2022 1:00 AM [place] Time    CT Head Without Contrast    Result Date: 11/29/2022  Impression: There our hypodensities corresponding in size and location to the acute infarct in the right basal ganglia and posterior right frontal lobe/precentral gyrus on the MRI of the brain from several days ago. No mass effect or midline shift. No acute hemorrhages. 2. Stable moderate amount of chronic small vessel ischemic change and other areas of old lacunar infarct are unchanged by CT.  Electronically Signed By-Mukund Williamson DO On:11/29/2022 5:21 PM This report was finalized on [id number] by  Mukund Williamson DO.    CT Soft Tissue Neck Without Contrast    Result Date: 11/29/2022  Impression: 1.     No evidence for acute abnormality involving the neck soft tissues. 2.     No evidence for abnormal fluid collection or abnormal soft tissue mass. No abnormal lymphadenopathy is seen.  Electronically Signed By-Catalino Bennett MD On:11/29/2022 12:33 PM This report was finalized on [id number] by  Catalino Bennett MD.    CT Angiogram Neck    Result Date: 11/24/2022  Impression: 1. Atherosclerosis throughout bilateral cervical common carotid arteries, carotid bifurcations, and carotid bulbs. 60% stenosis of the right CCA and 50% stenosis of the left CCA. 45% stenosis at the right carotid bulb and 50% stenosis at the left carotid  bulb. 2. Atherosclerosis and moderate stenosis at bilateral vertebral artery origins. 3. Atherosclerosis of the intracranial ICAs, without occlusion or significant stenosis. Mild to moderate multifocal stenoses of the ACAs. No large vessel occlusion intracranially. 4. Cervical spine degenerative changes. 5. Emphysematous changes in the  lung apices. Electronically signed by:  Jefferson Stockton M.D.  11/24/2022 9:52 PM [place] Time    CT Angiogram Abdomen Pelvis    Result Date: 12/3/2022  Impression: 1.  Rectus sheath hematoma (~8 x 3 x 12 cm), with a 3 mm focus of active arterial phase bleeding. This extends to a large pelvic hematoma (~16 x 9 x 10 cm), with moderate surrounding blood products. 2.  Cholelithiasis, mildly distended gallbladder. 3.  Evidence of basilar aspiration, bronchiolitis. Report called to  Dr. Rivas at 12/3/2022 4:11 AM  MST Electronically signed by:  Amanda Trotter M.D.  12/3/2022 4:47 AM [place] Time    MRI Brain Without Contrast    Result Date: 11/25/2022  Impression:  1. There are new areas of acute infarct within the right precentral gyrus cortex and within the lateral inferior right frontal lobe with stable acute ischemic areas in the right precentral gyrus subcortical white matter and right basal ganglia. 2. Chronic small vessel ischemic change and chronic lacunar type infarcts.  Electronically Signed By-Rogerio Norris MD On:11/25/2022 3:23 PM This report was finalized on [id number] by  Rogerio Norris MD.    MRI Brain Without Contrast    Result Date: 11/25/2022  Impression:  1. Acute/subacute lacunar type infarct in the right basal ganglia. 2. Mild diffusion restriction within the right precentral subcortical white matter and a tiny similar focus in the left parietal white matter that may reflect areas of subacute infarction. 3. Interval evolution of chronic appearing left basal ganglia lacunar type infarct. 4. Moderate chronic small vessel ischemic change.  Electronically Signed By-Rogerio Norris MD On:11/25/2022 8:37 AM This report was finalized on [id number] by  Rogerio Norris MD.    CT Abdomen Pelvis With Contrast    Result Date: 12/3/2022  Impression: 1.  Rectus sheath hematoma (~8 x 3 x 12 cm), with a 3 mm focus of active arterial phase bleeding. This extends to a large pelvic hematoma (~16 x 9 x 10 cm),  with moderate surrounding blood products. 2.  Cholelithiasis, mildly distended gallbladder. 3.  Evidence of basilar aspiration, bronchiolitis. Report called to  Dr. Rivas at 12/3/2022 4:11 AM  MST Electronically signed by:  Amanda Trotter M.D.  12/3/2022 4:47 AM [place] Time    US Liver    Result Date: 11/30/2022  Impression:  1. Suspect mild intrahepatic biliary duct dilatation with normal caliber common bile duct. Correlate with any clinical findings of biliary obstruction. Consider MRCP to evaluate for possible abnormality at the common hepatic duct bifurcation 2. Cholelithiasis, with gallbladder wall thickening possibly indicating chronic gallbladder disease  Electronically Signed By-Abel Herbert On:11/30/2022 4:03 PM This report was finalized on [id number] by  Abel Herbert, .    XR Chest 1 View    Result Date: 12/3/2022  Impression: Patchy left basilar consolidation suspicious for pneumonia.  Electronically Signed By-Krystian Roberts MD On:12/3/2022 5:59 PM This report was finalized on [id number] by  Krystian Roberts MD.    XR Chest 1 View    Result Date: 11/29/2022  Impression: No acute cardiopulmonary disease.  Electronically Signed By-Zev Roldan MD On:11/29/2022 11:29 AM This report was finalized on [id number] by  Zev Roldan MD.    XR Chest 1 View    Result Date: 11/24/2022  Impression: No acute cardiopulmonary abnormality. Electronically signed by:  Alex Henderson M.D.  11/24/2022 8:48 PM [place] Time    US Renal Bilateral    Result Date: 12/5/2022  Impression: 1. Mild left renal pelviectasis and mild hydronephrosis similar to prior CT exam. 2. Cortical thinning/scarring at the right kidney. No hydronephrosis on the right. 3. Left lower pelvic hematoma partially imaged.  Electronically Signed By-Surendra Lin MD On:12/5/2022 8:43 AM This report was finalized on [id number] by  Surendra Lin MD.    CT Head Without Contrast Stroke Protocol    Result Date: 11/24/2022  Impression: 1. No acute  intracranial abnormality. 2. Mild volume loss and chronic microvascular ischemic changes. Arteriosclerosis. No evidence of acute infarct by noncontrast CT head examination. 3. Remote infarct in the left basal ganglia extending into the left corona radiata. 4. Small remote infarcts in the left thalamus and right basal ganglia. These findings were discussed with Dr. Mendosa on 11/24/2022 8:33 PM (mountain time zone). Electronically signed by:  Petr Waller M.D.  11/24/2022 8:34 PM [place] Time    XR Abdomen KUB    Result Date: 12/2/2022  Impression: Feeding tube in the stomach  Electronically Signed By-Abel Herbert On:12/2/2022 9:28 PM This report was finalized on [id number] by  Abel Herbert, .    XR Abdomen KUB    Result Date: 11/26/2022  Impression: Feeding tube now projects in the left upper quadrant expected position of the gastric fundus.  If postpyloric positioning is desired advancement of an additional 10 to 15 cm would be recommended  Electronically Signed BySim Guadarrama On:11/26/2022 4:19 PM This report was finalized on [id number] by  Collin Guadarrama, Minh    XR Abdomen KUB    Result Date: 11/26/2022  Impression: Tip of the feeding tube projects just above the expected position of the GE junction.  Findings were called to the patient's nurse at the time of interpretation  Electronically Signed BySim Guadarrama On:11/26/2022 3:01 PM This report was finalized on [id number] by  Collin Guadarrama, .    CT Angiogram Head w AI Analysis of LVO    Result Date: 11/24/2022  Impression: 1. Atherosclerosis throughout bilateral cervical common carotid arteries, carotid bifurcations, and carotid bulbs. 60% stenosis of the right CCA and 50% stenosis of the left CCA. 45% stenosis at the right carotid bulb and 50% stenosis at the left carotid  bulb. 2. Atherosclerosis and moderate stenosis at bilateral vertebral artery origins. 3. Atherosclerosis of the intracranial ICAs, without occlusion or  significant stenosis. Mild to moderate multifocal stenoses of the ACAs. No large vessel occlusion intracranially. 4. Cervical spine degenerative changes. 5. Emphysematous changes in the lung apices. Electronically signed by:  Jefferson Stockton M.D.  11/24/2022 9:52 PM [place] Time    CT CEREBRAL PERFUSION WITH & WITHOUT CONTRAST    Result Date: 11/24/2022  Impression: Small area of transit time prolongation within the right posterior frontal lobe concerning for a small right MCA cortical infarct in this location. The approximate volume of this territory is 6 mL by software analysis. MRI can provide additional characterization and confirmation. These findings were discussed with Dr. Mendosa on 11/24/2022 9:11 PM (mount[place] time zone). Electronically signed by:  Petr Waller M.D.  11/24/2022 9:13 PM [place] Time      Results for orders placed during the hospital encounter of 11/24/22    Duplex Carotid Ultrasound CAR    Interpretation Summary  •  Proximal right internal carotid artery moderate stenosis.  •  Proximal left internal carotid artery moderate stenosis.      Results for orders placed during the hospital encounter of 11/24/22    Duplex Carotid Ultrasound CAR    Interpretation Summary  •  Proximal right internal carotid artery moderate stenosis.  •  Proximal left internal carotid artery moderate stenosis.      Results for orders placed during the hospital encounter of 11/24/22    Adult Transthoracic Echo Complete W/ Cont if Necessary Per Protocol (With Agitated Saline)    Interpretation Summary  •  Left ventricular ejection fraction appears to be 56 - 60%.  •  Estimated right ventricular systolic pressure from tricuspid regurgitation is moderately elevated (45-55 mmHg).  •  No pericardial effusion noted      Procedures Performed  Procedure(s):  ESOPHAGOGASTRODUODENOSCOPY WITH PERCUTANEOUS ENDOSCOPIC GASTROSTOMY TUBE INSERTION         Consults:   Consults     Date and Time Order Name Status Description     12/4/2022  3:32 PM Inpatient Intensivist Consult      12/3/2022  4:42 PM Inpatient Pulmonology Consult Completed     12/3/2022  8:18 AM Inpatient Nephrology Consult      12/3/2022  3:10 AM Inpatient General Surgery Consult Completed     11/30/2022 12:56 PM Inpatient Gastroenterology Consult Completed     11/26/2022  1:34 PM Inpatient Vascular Surgery Consult Completed     11/24/2022 11:05 PM Hospitalist (on-call MD unless specified)      11/24/2022  9:55 PM Inpatient Neurology Consult Stroke      11/24/2022  9:55 PM Inpatient Neurology Consult Stroke Completed             Discharge Details        Discharge Medications      New Medications      Instructions Start Date   LORazepam Intensol 2 MG/ML concentrated solution  Generic drug: LORazepam   Take 0.25 mL by mouth/sublinqual Every 4 (Four) Hours As Needed for anxiety/restlessness      morphine 20 mg/mL solution concentrated solution   Take 0.25 mL by mouth or under the tongue Every 2 (Two) Hours As Needed for pain or shortness of breath         Stop These Medications    amLODIPine 10 MG tablet  Commonly known as: NORVASC     aspirin 325 MG tablet     atorvastatin 80 MG tablet  Commonly known as: LIPITOR     lisinopril 20 MG tablet  Commonly known as: PRINIVIL,ZESTRIL     sertraline 50 MG tablet  Commonly known as: ZOLOFT            No Known Allergies      Discharge Disposition:   Home with Hospice      Diet:  Hospital:  Diet Order   Procedures   • NPO Diet NPO Type: Strict NPO     CODE STATUS:  Code Status and Medical Interventions:   Ordered at: 11/24/22 8950     Code Status (Patient has no pulse and is not breathing):    CPR (Attempt to Resuscitate)     Medical Interventions (Patient has pulse or is breathing):    Full Support     Release to patient:    Routine Release         Future Appointments   Date Time Provider Department Center   1/4/2023  9:30 AM Melba Rosen APRN [facility]   1/30/2023 11:00 AM [facility] 2 [facility]   4/10/2023  3:45 PM Seipel,  Manpreet WEAVER MD [facility]       Additional Instructions for the Follow-ups that You Need to Schedule     Call MD With Problems / Concerns   As directed      Instructions: Hospice provider    Order Comments: Instructions: Hospice provider              Time spent on Discharge including face to face service:  35 minutes    Signature: Electronically signed by Ok Scott MD, 12/09/22, 13:21 EST.  [facility]

## 2022-12-10 NOTE — NURSING NOTE
EMS arrived at 1901 to take individual to their home. This nurse called the patients hospice nurseMyra. This nurse advised the patient was currently leaving facility. The patient has left with EMS off the floor.

## 2022-12-12 NOTE — CASE MANAGEMENT/SOCIAL WORK
Case Management Discharge Note      Final Note: Home with [facility]         Selected Continued Care - Discharged on 12/9/2022 Admission date: 11/24/2022 - Discharge disposition: Hospice/Home                Transportation Services  Ambulance: [facility]    Final Discharge Disposition Code: 50 - home with hospice

## 2023-01-30 ENCOUNTER — APPOINTMENT (OUTPATIENT)
Dept: CT IMAGING | Facility: HOSPITAL | Age: 73
End: 2023-01-30